# Patient Record
Sex: MALE | Race: WHITE | NOT HISPANIC OR LATINO
[De-identification: names, ages, dates, MRNs, and addresses within clinical notes are randomized per-mention and may not be internally consistent; named-entity substitution may affect disease eponyms.]

---

## 2017-05-08 PROBLEM — Z00.00 ENCOUNTER FOR PREVENTIVE HEALTH EXAMINATION: Status: ACTIVE | Noted: 2017-05-08

## 2017-06-13 ENCOUNTER — FORM ENCOUNTER (OUTPATIENT)
Age: 72
End: 2017-06-13

## 2017-06-14 ENCOUNTER — APPOINTMENT (OUTPATIENT)
Dept: SPINE | Facility: CLINIC | Age: 72
End: 2017-06-14

## 2017-06-14 ENCOUNTER — OUTPATIENT (OUTPATIENT)
Dept: OUTPATIENT SERVICES | Facility: HOSPITAL | Age: 72
LOS: 1 days | End: 2017-06-14
Payer: MEDICARE

## 2017-06-14 VITALS
OXYGEN SATURATION: 96 % | HEART RATE: 75 BPM | BODY MASS INDEX: 28.56 KG/M2 | SYSTOLIC BLOOD PRESSURE: 134 MMHG | HEIGHT: 67 IN | WEIGHT: 182 LBS | DIASTOLIC BLOOD PRESSURE: 81 MMHG

## 2017-06-14 DIAGNOSIS — Z86.79 PERSONAL HISTORY OF OTHER DISEASES OF THE CIRCULATORY SYSTEM: ICD-10-CM

## 2017-06-14 DIAGNOSIS — Z78.9 OTHER SPECIFIED HEALTH STATUS: ICD-10-CM

## 2017-06-14 DIAGNOSIS — Z82.49 FAMILY HISTORY OF ISCHEMIC HEART DISEASE AND OTHER DISEASES OF THE CIRCULATORY SYSTEM: ICD-10-CM

## 2017-06-14 DIAGNOSIS — Z87.891 PERSONAL HISTORY OF NICOTINE DEPENDENCE: ICD-10-CM

## 2017-06-14 PROCEDURE — 72110 X-RAY EXAM L-2 SPINE 4/>VWS: CPT

## 2017-06-14 PROCEDURE — 72082 X-RAY EXAM ENTIRE SPI 2/3 VW: CPT

## 2017-06-14 PROCEDURE — 72084 X-RAY EXAM ENTIRE SPI 6/> VW: CPT | Mod: 26

## 2017-06-15 PROBLEM — Z87.891 FORMER SMOKER: Status: ACTIVE | Noted: 2017-06-14

## 2017-06-15 PROBLEM — Z82.49 FAMILY HISTORY OF HYPERTENSION: Status: ACTIVE | Noted: 2017-06-14

## 2017-06-15 PROBLEM — Z86.79 HISTORY OF HYPERTENSION: Status: RESOLVED | Noted: 2017-06-14 | Resolved: 2017-06-15

## 2017-06-15 PROBLEM — Z78.9 DOES NOT USE ILLICIT DRUGS: Status: ACTIVE | Noted: 2017-06-14

## 2017-06-15 RX ORDER — ENALAPRIL MALEATE 20 MG/1
20 TABLET ORAL
Refills: 0 | Status: ACTIVE | COMMUNITY

## 2017-06-15 RX ORDER — ACETAMINOPHEN 325 MG/1
TABLET, FILM COATED ORAL
Refills: 0 | Status: ACTIVE | COMMUNITY

## 2017-08-02 ENCOUNTER — APPOINTMENT (OUTPATIENT)
Dept: SPINE | Facility: CLINIC | Age: 72
End: 2017-08-02
Payer: MEDICARE

## 2017-08-02 VITALS
SYSTOLIC BLOOD PRESSURE: 150 MMHG | BODY MASS INDEX: 28.25 KG/M2 | WEIGHT: 180 LBS | HEIGHT: 67 IN | DIASTOLIC BLOOD PRESSURE: 75 MMHG | HEART RATE: 73 BPM | OXYGEN SATURATION: 95 %

## 2017-08-02 DIAGNOSIS — R41.3 OTHER AMNESIA: ICD-10-CM

## 2017-08-02 PROCEDURE — 99214 OFFICE O/P EST MOD 30 MIN: CPT

## 2017-09-28 ENCOUNTER — FORM ENCOUNTER (OUTPATIENT)
Age: 72
End: 2017-09-28

## 2017-09-29 ENCOUNTER — OUTPATIENT (OUTPATIENT)
Dept: OUTPATIENT SERVICES | Facility: HOSPITAL | Age: 72
LOS: 1 days | End: 2017-09-29
Payer: MEDICARE

## 2017-09-29 PROCEDURE — 72131 CT LUMBAR SPINE W/O DYE: CPT

## 2017-09-29 PROCEDURE — 72131 CT LUMBAR SPINE W/O DYE: CPT | Mod: 26

## 2017-10-05 VITALS
WEIGHT: 188.5 LBS | TEMPERATURE: 98 F | DIASTOLIC BLOOD PRESSURE: 92 MMHG | HEIGHT: 67 IN | RESPIRATION RATE: 18 BRPM | SYSTOLIC BLOOD PRESSURE: 157 MMHG | OXYGEN SATURATION: 95 % | HEART RATE: 70 BPM

## 2017-10-05 NOTE — PATIENT PROFILE ADULT. - NS PRO AD PATIENT TYPE
[facility]    [address] [facility] [address]    Phone:  [phone]           Antonio De La Fuente   MRN: [id number]    Department:  [facility]   Date of Visit:  6/22/2017           Diagnosis     Olecranon bursitis of right elbow        You were seen by Burke Coronado MD and Natalia Garcia PA-C.      Disclaimer     Follow-up Care:  It is your responsibility to arrange for follow-up care with your healthcare provider or as instructed. Call to get an appointment time.           Contact your doctor for follow-up appointment if not already scheduled.     Follow up with Your PCP. Schedule an appointment as soon as possible for a visit in 1 week.    Comments:  For follow up        Schedule an appointment as soon as possible for a visit with Kenrick Duval MD.    Specialty:  Orthopedic Surgery    Comments:  As needed, For orthopedic follow up.    Contact information    [address] [facility] [address]  [phone]        Medications you received while in the ED through 06/22/2017  3:28 PM     Date/Time Order Dose Route Action    06/22/2017  2:21 PM acetaminophen (TYLENOL) tablet 650 mg 650 mg Oral Given         What to Do with Your Medications      START taking these medications today unless otherwise stated        Details    acetaminophen 500 MG tablet   Commonly known as:  TYLENOL        Take 1 tablet by mouth every 6 hours as needed for Pain.   Authorizing Provider:  Natalia Garcia       ibuprofen 800 MG tablet   Commonly known as:  MOTRIN        Take 1 tablet by mouth 3 times daily as needed for Pain.   Authorizing Provider:  Natalia Garcia                             Where to Get Your Medications      You can get these medications from any pharmacy     Bring a paper prescription for each of these medications     acetaminophen 500 MG tablet    ibuprofen 800 MG tablet               Procedures and tests performed during your visit     Urine pregnancy POC    XR  Elbow 3 View Right      Procedures     None      Imaging Results         XR Elbow 3 View Right (Final result) Result time:  06/22/17 14:56:14    Final result    Impression:    IMPRESSION:    No acute osseous findings.    I have reviewed the images and agree with the Resident interpretation.      Narrative:      XR ELBOW 3+ VW RIGHT    HISTORY:  Elbow pain.    COMPARISON:  None.    TECHNIQUE: 4 views right elbow    FINDINGS:    No fracture or dislocation. No joint effusion. No significant soft tissue  swelling.              Discharge Instructions         Bursitis En El Codo (Olécranon) [Bursitis, Elbow (Olecranon)]  La articulación del codo contiene un pequeño saco lleno de líquido llamado bolsa, que ayuda a los músculos y los tendones a deslizarse con fluidez sobre el hueso. Además, la bolsa protege el codo suavizando los impactos. Se produce la bursitis cuando la bolsa se inflama o se hincha, a menudo por el sobreuso o sebastián lesión al codo. La bursitis se caracteriza por síntomas gretchen hinchazón y dolor. Si el codo está enrojecido y caliente al tacto, es posible que la bolsa se haya infectado.  En muchos casos, la bursitis del codo se resuelve con medicamentos y autocuidados en la casa. La bolsa podría tardar de 2 a 3 semanas en sanar y desinflamarse por completo. En algunos casos, se drena el exceso de líquido de la bolsa; también pueden inyectarse medicamentos directamente en la bolsa, para ayudar a aliviar los síntomas. En casos graves, podría ser necesario extirpar (sacar) la bolsa quirúrgicamente.    Cuidados En La [place]  Medicamentos: Para aliviar el dolor y la hinchazón, podrían recomendarle que tome medicamentos de venta francesca o recetarle analgésicos (calmantes del dolor). [place] todos brian medicamentos de la forma indicada. Podrían recetarle antibióticos para tratar o prevenir sebastián infección. Si se los recetan, tómelos según las indicaciones hasta que se le terminen.  Cuidados Generales:   · Aplíquese al codo sebastián  compresa de hielo o sebastián bolsa de chícharos congelados envuelta en un paño macias truong 15 a 20 minutos a la vez. Jose esto 3 o 4 veces al día hasta que le mejore el dolor y la hinchazón.  · Mantenga elevado el codo por encima del nivel del corazón siempre que le sea posible. [place] ayudará a reducir la hinchazón. Al sentarse o acostarse, apoye el brazo en sebastián almohada colocada sobre el pecho o a aquino lado.  · Véndese la articulación del codo con sebastián venda Ace para comprimir la celia mientras galdino. Asegúrese de que la venda quede ceñida, jose roberto no apretada al punto de causarle dolor.  · Descanse el codo para darle tiempo para sanar. Quizás necesite ponerse sebastián codera para ayudar a protegerse la articulación y limitar brian movimientos. Truong aquino recuperación e incluso después, evite apoyarse en los codos.  Jose sebastián VISITA DE CONTROL según le indique el médico o el personal del centro. Si lo shannon remitido a un especialista, jsoe sebastián jeffry sin demora.  Obtenga Atención Médica Inmediata  en cualquiera de los siguientes casos:  · Fiebre de 100.4°F (38°C) o superior  · Mayor dolor, hinchazón, sensación de calor, enrojecimiento o secreción de la articulación  · Dificultades para  la articulación del codo  · Cortadas o heridas abiertas en la articulación  · Entumecimiento u hormigueo de la mano  · Stevan dolor o hinchazón del antebrazo o la mano  · Pérdida del color gomez y lenta recuperación del color después de apretar la punta de los dedos o la mano  © [id number] [employer] [address] [place] [address], [place] [address]. Todos los derechos reservados. Esta información no pretende sustituir la atención médica profesional. Sólo aquino médico puede diagnosticar y tratar un problema de jennifer.          Discharge References/Attachments     None      Medication Safety: What you need to know     Maintain Security - It is important to keep all medications in a secure location:  Keep out of the reach of children and  pets    Consider using a lock box or locked filing cabinet    Place pill bottles in private area such as bedroom or drawer    Don't Share - It is illegal to share your prescription medication, even with family:  The doctor prescribes medications specifically for you and your body    You cannot be sure how the drug may affect others physically or emotionally    It is a criminal offense to share prescriptions    Proper Disposal - It is no longer acceptable to flush or throw away medications:  Recent studies show measurable amounts of medication have been found in drinking water and wildlife due to flushing or throwing away medications    Medication strength changes over time and is not typically safe after one year    Proper disposal removes the medication from your home in a safe way so that others don't have access to it. Use your local drug drop site.    Your local pharmacy can provide information on medication disposal options in your community. The Department of Justice Drug Enforcement Administration website also has information on safe medication disposal:  www.deadiversion.[facility].gov/drug_disposal/index.html         Health Care Proxy (HCP)

## 2017-10-06 ENCOUNTER — INPATIENT (INPATIENT)
Facility: HOSPITAL | Age: 72
LOS: 1 days | Discharge: HOME | DRG: 460 | End: 2017-10-08
Attending: NEUROLOGICAL SURGERY | Admitting: NEUROLOGICAL SURGERY
Payer: MEDICARE

## 2017-10-06 DIAGNOSIS — M48.061 SPINAL STENOSIS, LUMBAR REGION WITHOUT NEUROGENIC CLAUDICATION: ICD-10-CM

## 2017-10-06 PROCEDURE — 22612 ARTHRD PST TQ 1NTRSPC LUMBAR: CPT | Mod: 62

## 2017-10-06 PROCEDURE — 61783 SCAN PROC SPINAL: CPT | Mod: 59

## 2017-10-06 PROCEDURE — 22840 INSERT SPINE FIXATION DEVICE: CPT

## 2017-10-06 PROCEDURE — 22840 INSERT SPINE FIXATION DEVICE: CPT | Mod: 80

## 2017-10-06 PROCEDURE — 63047 LAM FACETEC & FORAMOT LUMBAR: CPT | Mod: 62

## 2017-10-06 PROCEDURE — 63048 LAM FACETEC &FORAMOT EA ADDL: CPT | Mod: 62

## 2017-10-06 PROCEDURE — 72131 CT LUMBAR SPINE W/O DYE: CPT | Mod: 26

## 2017-10-06 PROCEDURE — 61783 SCAN PROC SPINAL: CPT | Mod: 80

## 2017-10-06 RX ORDER — ACETAMINOPHEN 500 MG
650 TABLET ORAL EVERY 6 HOURS
Qty: 0 | Refills: 0 | Status: DISCONTINUED | OUTPATIENT
Start: 2017-10-06 | End: 2017-10-08

## 2017-10-06 RX ORDER — ACETAMINOPHEN 500 MG
1000 TABLET ORAL ONCE
Qty: 0 | Refills: 0 | Status: DISCONTINUED | OUTPATIENT
Start: 2017-10-06 | End: 2017-10-08

## 2017-10-06 RX ORDER — FINASTERIDE 5 MG/1
5 TABLET, FILM COATED ORAL DAILY
Qty: 0 | Refills: 0 | Status: DISCONTINUED | OUTPATIENT
Start: 2017-10-06 | End: 2017-10-08

## 2017-10-06 RX ORDER — SENNA PLUS 8.6 MG/1
2 TABLET ORAL AT BEDTIME
Qty: 0 | Refills: 0 | Status: DISCONTINUED | OUTPATIENT
Start: 2017-10-06 | End: 2017-10-08

## 2017-10-06 RX ORDER — ONDANSETRON 8 MG/1
4 TABLET, FILM COATED ORAL EVERY 6 HOURS
Qty: 0 | Refills: 0 | Status: DISCONTINUED | OUTPATIENT
Start: 2017-10-06 | End: 2017-10-08

## 2017-10-06 RX ORDER — HYDROMORPHONE HYDROCHLORIDE 2 MG/ML
0.5 INJECTION INTRAMUSCULAR; INTRAVENOUS; SUBCUTANEOUS
Qty: 0 | Refills: 0 | Status: DISCONTINUED | OUTPATIENT
Start: 2017-10-06 | End: 2017-10-07

## 2017-10-06 RX ORDER — TAMSULOSIN HYDROCHLORIDE 0.4 MG/1
0.4 CAPSULE ORAL AT BEDTIME
Qty: 0 | Refills: 0 | Status: DISCONTINUED | OUTPATIENT
Start: 2017-10-06 | End: 2017-10-08

## 2017-10-06 RX ORDER — GINKGO BILOBA 40 MG
0 CAPSULE ORAL
Qty: 0 | Refills: 0 | COMMUNITY

## 2017-10-06 RX ORDER — ASPIRIN/CALCIUM CARB/MAGNESIUM 324 MG
1 TABLET ORAL
Qty: 0 | Refills: 0 | COMMUNITY

## 2017-10-06 RX ORDER — OXYCODONE AND ACETAMINOPHEN 5; 325 MG/1; MG/1
1 TABLET ORAL EVERY 4 HOURS
Qty: 0 | Refills: 0 | Status: DISCONTINUED | OUTPATIENT
Start: 2017-10-06 | End: 2017-10-06

## 2017-10-06 RX ORDER — SODIUM CHLORIDE 9 MG/ML
1000 INJECTION INTRAMUSCULAR; INTRAVENOUS; SUBCUTANEOUS
Qty: 0 | Refills: 0 | Status: DISCONTINUED | OUTPATIENT
Start: 2017-10-06 | End: 2017-10-07

## 2017-10-06 RX ORDER — OXYCODONE AND ACETAMINOPHEN 5; 325 MG/1; MG/1
2 TABLET ORAL EVERY 4 HOURS
Qty: 0 | Refills: 0 | Status: DISCONTINUED | OUTPATIENT
Start: 2017-10-06 | End: 2017-10-08

## 2017-10-06 RX ORDER — BUPIVACAINE 13.3 MG/ML
20 INJECTION, SUSPENSION, LIPOSOMAL INFILTRATION ONCE
Qty: 0 | Refills: 0 | Status: DISCONTINUED | OUTPATIENT
Start: 2017-10-06 | End: 2017-10-08

## 2017-10-06 RX ORDER — CYCLOBENZAPRINE HYDROCHLORIDE 10 MG/1
5 TABLET, FILM COATED ORAL THREE TIMES A DAY
Qty: 0 | Refills: 0 | Status: DISCONTINUED | OUTPATIENT
Start: 2017-10-06 | End: 2017-10-08

## 2017-10-06 RX ORDER — DOCUSATE SODIUM 100 MG
100 CAPSULE ORAL THREE TIMES A DAY
Qty: 0 | Refills: 0 | Status: DISCONTINUED | OUTPATIENT
Start: 2017-10-06 | End: 2017-10-08

## 2017-10-06 RX ORDER — INFLUENZA VIRUS VACCINE 15; 15; 15; 15 UG/.5ML; UG/.5ML; UG/.5ML; UG/.5ML
0.5 SUSPENSION INTRAMUSCULAR ONCE
Qty: 0 | Refills: 0 | Status: COMPLETED | OUTPATIENT
Start: 2017-10-06 | End: 2017-10-08

## 2017-10-06 RX ORDER — PANTOPRAZOLE SODIUM 20 MG/1
40 TABLET, DELAYED RELEASE ORAL DAILY
Qty: 0 | Refills: 0 | Status: DISCONTINUED | OUTPATIENT
Start: 2017-10-06 | End: 2017-10-08

## 2017-10-06 RX ORDER — DEXAMETHASONE 0.5 MG/5ML
4 ELIXIR ORAL EVERY 6 HOURS
Qty: 0 | Refills: 0 | Status: DISCONTINUED | OUTPATIENT
Start: 2017-10-06 | End: 2017-10-07

## 2017-10-06 RX ORDER — OXYCODONE AND ACETAMINOPHEN 5; 325 MG/1; MG/1
1 TABLET ORAL EVERY 4 HOURS
Qty: 0 | Refills: 0 | Status: DISCONTINUED | OUTPATIENT
Start: 2017-10-06 | End: 2017-10-08

## 2017-10-06 RX ORDER — HYDROMORPHONE HYDROCHLORIDE 2 MG/ML
1 INJECTION INTRAMUSCULAR; INTRAVENOUS; SUBCUTANEOUS
Qty: 0 | Refills: 0 | Status: DISCONTINUED | OUTPATIENT
Start: 2017-10-06 | End: 2017-10-06

## 2017-10-06 RX ADMIN — FINASTERIDE 5 MILLIGRAM(S): 5 TABLET, FILM COATED ORAL at 20:51

## 2017-10-06 RX ADMIN — TAMSULOSIN HYDROCHLORIDE 0.4 MILLIGRAM(S): 0.4 CAPSULE ORAL at 20:51

## 2017-10-06 RX ADMIN — HYDROMORPHONE HYDROCHLORIDE 0.5 MILLIGRAM(S): 2 INJECTION INTRAMUSCULAR; INTRAVENOUS; SUBCUTANEOUS at 14:23

## 2017-10-06 RX ADMIN — HYDROMORPHONE HYDROCHLORIDE 0.5 MILLIGRAM(S): 2 INJECTION INTRAMUSCULAR; INTRAVENOUS; SUBCUTANEOUS at 14:43

## 2017-10-06 RX ADMIN — Medication 4 MILLIGRAM(S): at 23:37

## 2017-10-06 RX ADMIN — Medication 4 MILLIGRAM(S): at 18:08

## 2017-10-06 NOTE — PROGRESS NOTE ADULT - SUBJECTIVE AND OBJECTIVE BOX
Subjective:  Post op note  POD#) L4-5 decompression laminectomy, posterior fusion instrumentation  Patient doing without significant pain. Surgical is wozzing secondary to hx of aspirin.    T(C): 36.2 (10-06-17 @ 16:32), Max: 36.7 (10-06-17 @ 15:00)  HR: 84 (10-06-17 @ 16:32) (80 - 90)  BP: 148/83 (10-06-17 @ 16:32) (134/70 - 166/94)  RR: 17 (10-06-17 @ 16:32) (10 - 17)  SpO2: 97% (10-06-17 @ 16:32) (91% - 98%)  Wt(kg): --    Exam: A&O x 3 NAD  Back: dressing was soaked with blood. It was changed. Pressure applied to 4   wozzing   spots..  Ext: No focal motor deficit. 5/5 x 4    Wound: dressing changed as above. Hemovac in place    Imaging: none    Assessment/Plan: 71 male post lumbar decompression doing well  Plan: change dressing as needed  Monitor hemovac output  Regular diet  Remove osorio in AM  continue decadron

## 2017-10-06 NOTE — CONSULT NOTE ADULT - SUBJECTIVE AND OBJECTIVE BOX
Pre-surgical Pain Inventory    Surgery: L45 lamifusion    Surgeon: Dr. Aldridge    PAST MEDICAL & SURGICAL HISTORY:  Lumbar stenosis  BPH (benign prostatic hyperplasia)  HTN (hypertension)  No significant past surgical history      Current Pre-op Pain Medications  Tylenol  Prescribed by: N/A    Past Pain Medication:   Pt denies recollection of pain meds. States that he took a short course following dental procedure, but does not recall name, states that he had no SE.  [] Oxycodone  [] Hydrocodone  [] Methadone  [] Fentanyl  [] Dilaudid  [] Morphine  [] Tramadol  [X] NSAIDs/Anti-inflammatories  [] Medrol/Decadron  [] Neuropathic Agents  [] Muscle Relaxants  - Denies  [] Anxiolytics  - Denies  [] Anti-depressants   - Denies  [] Sleep aids  - Denies      Allergies    No Known Allergies    Intolerances        Vital Signs:  Vital Signs Last 24 Hrs  T(C): 36.5 (05 Oct 2017 09:36), Max: 36.5 (05 Oct 2017 09:36)  T(F): 97.7 (05 Oct 2017 09:36), Max: 97.7 (05 Oct 2017 09:36)  HR: 70 (05 Oct 2017 09:36) (70 - 70)  BP: 157/92 (05 Oct 2017 09:36) (157/92 - 157/92)  BP(mean): --  RR: 18 (05 Oct 2017 09:36) (18 - 18)  SpO2: 95% (05 Oct 2017 09:36) (95% - 95%)    Labs:     FUNCTIONAL ASSESSMENT:  AVERAGE DAILY PAIN SCORE: Mild 3-4/10    PAIN ASSESSMENT:  Pt states that pain is mild-mod. Pain is in low back, pressure like sensation, non-continuous, primarily brought on when ambulating. Pt denies radicular pain into buttocks/legs/groin/abdomen, pt denies numbness/cramping, motor weakness in LE. States that he has isolated difficulty with hand , reports hx of dropping items.    Assessment:   70 yo M w/ c/o low back pain w/ plan for L45 lamifusion Pre-surgical Pain Inventory    Surgery: L45 lamifusion    Surgeon: Dr. Aldridge    PAST MEDICAL & SURGICAL HISTORY:  Lumbar stenosis  BPH (benign prostatic hyperplasia)  HTN (hypertension)  No significant past surgical history      Current Pre-op Pain Medications  Tylenol  Prescribed by: N/A    Past Pain Medication:   Pt denies recollection of pain meds. States that he took a short course following dental procedure, but does not recall name, states that he had no SE.  [] Oxycodone  [] Hydrocodone  [] Methadone  [] Fentanyl  [] Dilaudid  [] Morphine  [] Tramadol  [X] NSAIDs/Anti-inflammatories  [] Medrol/Decadron  [] Neuropathic Agents  [] Muscle Relaxants  - Denies  [] Anxiolytics  - Denies  [] Anti-depressants   - Denies  [] Sleep aids  - Denies      Allergies    No Known Allergies    Intolerances        Vital Signs:  Vital Signs Last 24 Hrs  T(C): 36.5 (05 Oct 2017 09:36), Max: 36.5 (05 Oct 2017 09:36)  T(F): 97.7 (05 Oct 2017 09:36), Max: 97.7 (05 Oct 2017 09:36)  HR: 70 (05 Oct 2017 09:36) (70 - 70)  BP: 157/92 (05 Oct 2017 09:36) (157/92 - 157/92)  BP(mean): --  RR: 18 (05 Oct 2017 09:36) (18 - 18)  SpO2: 95% (05 Oct 2017 09:36) (95% - 95%)    Labs:     FUNCTIONAL ASSESSMENT:  AVERAGE DAILY PAIN SCORE: Mild 3-4/10    PAIN ASSESSMENT:  Pt states that pain is mild-mod. Pain is in low back, pressure like sensation, non-continuous, primarily brought on when ambulating. Pt denies radicular pain into buttocks/legs/groin/abdomen, pt denies numbness/cramping, motor weakness in LE. States that he has isolated difficulty with hand , reports hx of dropping items.    Assessment:   70 yo M w/ c/o low back pain w/ plan for L45 lamifusion    Plan:   Immediate post-op plan  - Dilaudid 0.5mg q1h PRN for Severe Pain in PACU, Ofirmev as 1st line on floor  - Percocet dosing, 5-10mg q4h PRN for Mod-Severe Pain  - Flexeril 5mg q8h PRN for Spasms    Rescue plan  - Dilaudid  - Ofirmev    Tentative floor plan  - PO Percocet, Flexeril

## 2017-10-06 NOTE — H&P ADULT - HISTORY OF PRESENT ILLNESS
71 male With hx of HTN, BPH complaints lower back pain radiating to bilateral LE with neurogenic claudication. x 1 month progressive getting worse. He can not walk for more than  block. He denies weakness and numbness.

## 2017-10-06 NOTE — BRIEF OPERATIVE NOTE - PROCEDURE
<<-----Click on this checkbox to enter Procedure Lumbar laminectomy with fusion  10/06/2017    Active  CANDY

## 2017-10-07 LAB
ALBUMIN SERPL ELPH-MCNC: 3.4 G/DL — SIGNIFICANT CHANGE UP (ref 3.3–5)
ALP SERPL-CCNC: 73 U/L — SIGNIFICANT CHANGE UP (ref 40–120)
ALT FLD-CCNC: 20 U/L — SIGNIFICANT CHANGE UP (ref 10–45)
ANION GAP SERPL CALC-SCNC: 13 MMOL/L — SIGNIFICANT CHANGE UP (ref 5–17)
AST SERPL-CCNC: 23 U/L — SIGNIFICANT CHANGE UP (ref 10–40)
BASOPHILS NFR BLD AUTO: 0 % — SIGNIFICANT CHANGE UP (ref 0–2)
BILIRUB SERPL-MCNC: 1.3 MG/DL — HIGH (ref 0.2–1.2)
BUN SERPL-MCNC: 14 MG/DL — SIGNIFICANT CHANGE UP (ref 7–23)
CALCIUM SERPL-MCNC: 8.8 MG/DL — SIGNIFICANT CHANGE UP (ref 8.4–10.5)
CHLORIDE SERPL-SCNC: 99 MMOL/L — SIGNIFICANT CHANGE UP (ref 96–108)
CO2 SERPL-SCNC: 23 MMOL/L — SIGNIFICANT CHANGE UP (ref 22–31)
CREAT SERPL-MCNC: 0.85 MG/DL — SIGNIFICANT CHANGE UP (ref 0.5–1.3)
EOSINOPHIL NFR BLD AUTO: 0 % — SIGNIFICANT CHANGE UP (ref 0–6)
GLUCOSE SERPL-MCNC: 155 MG/DL — HIGH (ref 70–99)
HCT VFR BLD CALC: 34.6 % — LOW (ref 39–50)
HGB BLD-MCNC: 11.5 G/DL — LOW (ref 13–17)
LYMPHOCYTES # BLD AUTO: 7 % — LOW (ref 13–44)
MCHC RBC-ENTMCNC: 23 PG — LOW (ref 27–34)
MCHC RBC-ENTMCNC: 33.2 G/DL — SIGNIFICANT CHANGE UP (ref 32–36)
MCV RBC AUTO: 69.1 FL — LOW (ref 80–100)
MONOCYTES NFR BLD AUTO: 5.5 % — SIGNIFICANT CHANGE UP (ref 2–14)
NEUTROPHILS NFR BLD AUTO: 87.5 % — HIGH (ref 43–77)
PLATELET # BLD AUTO: 136 K/UL — LOW (ref 150–400)
POTASSIUM SERPL-MCNC: 4.1 MMOL/L — SIGNIFICANT CHANGE UP (ref 3.5–5.3)
POTASSIUM SERPL-SCNC: 4.1 MMOL/L — SIGNIFICANT CHANGE UP (ref 3.5–5.3)
PROT SERPL-MCNC: 6.4 G/DL — SIGNIFICANT CHANGE UP (ref 6–8.3)
RBC # BLD: 5.01 M/UL — SIGNIFICANT CHANGE UP (ref 4.2–5.8)
RBC # FLD: 15.2 % — SIGNIFICANT CHANGE UP (ref 10.3–16.9)
SODIUM SERPL-SCNC: 135 MMOL/L — SIGNIFICANT CHANGE UP (ref 135–145)
WBC # BLD: 14.4 K/UL — HIGH (ref 3.8–10.5)
WBC # FLD AUTO: 14.4 K/UL — HIGH (ref 3.8–10.5)

## 2017-10-07 RX ORDER — DEXAMETHASONE 0.5 MG/5ML
4 ELIXIR ORAL EVERY 8 HOURS
Qty: 0 | Refills: 0 | Status: DISCONTINUED | OUTPATIENT
Start: 2017-10-07 | End: 2017-10-08

## 2017-10-07 RX ORDER — ENOXAPARIN SODIUM 100 MG/ML
40 INJECTION SUBCUTANEOUS AT BEDTIME
Qty: 0 | Refills: 0 | Status: DISCONTINUED | OUTPATIENT
Start: 2017-10-07 | End: 2017-10-08

## 2017-10-07 RX ORDER — ZOLPIDEM TARTRATE 10 MG/1
5 TABLET ORAL AT BEDTIME
Qty: 0 | Refills: 0 | Status: DISCONTINUED | OUTPATIENT
Start: 2017-10-07 | End: 2017-10-08

## 2017-10-07 RX ORDER — DIPHENHYDRAMINE HCL 50 MG
25 CAPSULE ORAL EVERY 6 HOURS
Qty: 0 | Refills: 0 | Status: DISCONTINUED | OUTPATIENT
Start: 2017-10-07 | End: 2017-10-07

## 2017-10-07 RX ADMIN — Medication 100 MILLIGRAM(S): at 05:46

## 2017-10-07 RX ADMIN — Medication 4 MILLIGRAM(S): at 21:47

## 2017-10-07 RX ADMIN — Medication 4 MILLIGRAM(S): at 05:46

## 2017-10-07 RX ADMIN — CYCLOBENZAPRINE HYDROCHLORIDE 5 MILLIGRAM(S): 10 TABLET, FILM COATED ORAL at 00:09

## 2017-10-07 RX ADMIN — FINASTERIDE 5 MILLIGRAM(S): 5 TABLET, FILM COATED ORAL at 11:05

## 2017-10-07 RX ADMIN — ENOXAPARIN SODIUM 40 MILLIGRAM(S): 100 INJECTION SUBCUTANEOUS at 21:48

## 2017-10-07 RX ADMIN — CYCLOBENZAPRINE HYDROCHLORIDE 5 MILLIGRAM(S): 10 TABLET, FILM COATED ORAL at 12:05

## 2017-10-07 RX ADMIN — PANTOPRAZOLE SODIUM 40 MILLIGRAM(S): 20 TABLET, DELAYED RELEASE ORAL at 11:05

## 2017-10-07 RX ADMIN — Medication 100 MILLIGRAM(S): at 21:47

## 2017-10-07 RX ADMIN — TAMSULOSIN HYDROCHLORIDE 0.4 MILLIGRAM(S): 0.4 CAPSULE ORAL at 21:47

## 2017-10-07 RX ADMIN — Medication 20 MILLIGRAM(S): at 11:05

## 2017-10-07 RX ADMIN — Medication 100 MILLIGRAM(S): at 13:38

## 2017-10-07 RX ADMIN — Medication 4 MILLIGRAM(S): at 12:05

## 2017-10-07 NOTE — PHYSICAL THERAPY INITIAL EVALUATION ADULT - ADDITIONAL COMMENTS
Patient lives with spouse in apartment building with elevators, patient was independent in ADL's and IADL's. no home care prior.  Patient was independent in community  ambulation without assistive device

## 2017-10-07 NOTE — PHYSICAL THERAPY INITIAL EVALUATION ADULT - ASR EQUIP NEEDS DISCH PT EVAL
Patient was issued an alluminum adjustable cane Patient was issued an alluminum adjustable cane for home discharge

## 2017-10-07 NOTE — PROGRESS NOTE ADULT - SUBJECTIVE AND OBJECTIVE BOX
HPI:  71 male With hx of HTN, BPH complaints lower back pain radiating to bilateral LE with neurogenic claudication. x 1 month progressive getting worse. He can not walk for more than  block. He denies weakness and numbness. (06 Oct 2017 13:01)    OVERNIGHT EVENTS:  Pt c/o insomnia overnight. Pain controlled, incisional . No leg pain. Pt not OOB yet. Osorio in place. Wound dressing changed 2 times post op for saturation.     Vital Signs Last 24 Hrs  T(C): 36.3 (07 Oct 2017 09:04), Max: 36.7 (06 Oct 2017 15:00)  T(F): 97.4 (07 Oct 2017 09:04), Max: 98 (06 Oct 2017 15:00)  HR: 76 (07 Oct 2017 09:04) (72 - 91)  BP: 140/78 (07 Oct 2017 09:04) (118/70 - 166/94)  BP(mean): 106 (06 Oct 2017 16:00) (93 - 121)  RR: 14 (07 Oct 2017 09:04) (10 - 17)  SpO2: 97% (07 Oct 2017 09:04) (91% - 98%)    I&O's Summary    06 Oct 2017 07:01  -  07 Oct 2017 07:00  --------------------------------------------------------  IN: 1880 mL / OUT: 2220 mL / NET: -340 mL        PHYSICAL EXAM:  Neurological:  DEAN 5/5 motor  sensory intact    Incision/Wound: dressing saturated. Changed. Staples in place. No active drainage noted.     TUBES/LINES:  [x] Osorio  [] Lumbar Drain  [x] Wound Drains-20cc/shift  [] Others      DIET:  [] NPO  [x] Mechanical  [] Tube feeds    LABS:                        11.5   14.4  )-----------( 136      ( 07 Oct 2017 07:45 )             34.6     10-07    135  |  99  |  14  ----------------------------<  155<H>  4.1   |  23  |  0.85    Ca    8.8      07 Oct 2017 07:45    TPro  6.4  /  Alb  3.4  /  TBili  1.3<H>  /  DBili  x   /  AST  23  /  ALT  20  /  AlkPhos  73  10-07            CAPILLARY BLOOD GLUCOSE          Drug Levels: [] N/A    CSF Analysis: [] N/A      Allergies    No Known Allergies    Intolerances      MEDICATIONS:  Antibiotics:    Neuro:  acetaminophen   Tablet 650 milliGRAM(s) Oral every 6 hours PRN  acetaminophen   Tablet. 650 milliGRAM(s) Oral every 6 hours PRN  acetaminophen  IVPB. 1000 milliGRAM(s) IV Intermittent once PRN  cyclobenzaprine 5 milliGRAM(s) Oral three times a day PRN  ondansetron Injectable 4 milliGRAM(s) IV Push every 6 hours PRN  oxyCODONE    5 mG/acetaminophen 325 mG 1 Tablet(s) Oral every 4 hours PRN  oxyCODONE    5 mG/acetaminophen 325 mG 2 Tablet(s) Oral every 4 hours PRN  zolpidem 5 milliGRAM(s) Oral at bedtime PRN    Anticoagulation:    OTHER:  BUpivacaine liposome 1.3% Injectable (no eMAR) 20 milliLiter(s) Local Injection once  dexamethasone     Tablet 4 milliGRAM(s) Oral every 8 hours  docusate sodium 100 milliGRAM(s) Oral three times a day  enalapril 20 milliGRAM(s) Oral daily  finasteride 5 milliGRAM(s) Oral daily  influenza   Vaccine 0.5 milliLiter(s) IntraMuscular once  pantoprazole    Tablet 40 milliGRAM(s) Oral daily  senna 2 Tablet(s) Oral at bedtime PRN  tamsulosin 0.4 milliGRAM(s) Oral at bedtime    IVF:    CULTURES:    RADIOLOGY & ADDITIONAL TESTS:      ASSESSMENT:  71y Male s/p L4-L5 laminectomy fusion on 10/6    M48.06  No h/o HF  No pertinent family history in first degree relatives  Handoff  MEWS Score  Lumbar stenosis  BPH (benign prostatic hyperplasia)  HTN (hypertension)  Lumbar stenosis with neurogenic claudication  Lumbar stenosis with neurogenic claudication  Lumbar stenosis  Lumbar laminectomy with fusion  No significant past surgical history      PLAN:  -pain management  -ambien prn insomnia  -con't wound drain until tomorrow  -MRI Abd to evaluate pancreatic mass seen on outpatient imaging  -d/c osorio for void trial  -heplock IVF  -decadron taper  -D/W     DVT PROPHYLAXIS:  [x] Venodynes                                [x] Lovenox    DISPOSITION:pending PT eval

## 2017-10-07 NOTE — PHYSICAL THERAPY INITIAL EVALUATION ADULT - GENERAL OBSERVATIONS, REHAB EVAL
Patient received supine in bed, in NAD, hemovac, osorio catheter , iv heplock intact ,  family present

## 2017-10-08 ENCOUNTER — TRANSCRIPTION ENCOUNTER (OUTPATIENT)
Age: 72
End: 2017-10-08

## 2017-10-08 VITALS
HEART RATE: 72 BPM | SYSTOLIC BLOOD PRESSURE: 155 MMHG | DIASTOLIC BLOOD PRESSURE: 85 MMHG | OXYGEN SATURATION: 95 % | TEMPERATURE: 97 F | RESPIRATION RATE: 16 BRPM

## 2017-10-08 PROCEDURE — 74183 MRI ABD W/O CNTR FLWD CNTR: CPT | Mod: 26

## 2017-10-08 RX ORDER — SENNA PLUS 8.6 MG/1
2 TABLET ORAL
Qty: 0 | Refills: 0 | DISCHARGE
Start: 2017-10-08

## 2017-10-08 RX ORDER — ACETAMINOPHEN 500 MG
2 TABLET ORAL
Qty: 0 | Refills: 0 | DISCHARGE
Start: 2017-10-08

## 2017-10-08 RX ORDER — PANTOPRAZOLE SODIUM 20 MG/1
1 TABLET, DELAYED RELEASE ORAL
Qty: 10 | Refills: 0
Start: 2017-10-08 | End: 2017-10-18

## 2017-10-08 RX ORDER — CYCLOBENZAPRINE HYDROCHLORIDE 10 MG/1
1 TABLET, FILM COATED ORAL
Qty: 15 | Refills: 0
Start: 2017-10-08 | End: 2017-10-13

## 2017-10-08 RX ORDER — DOCUSATE SODIUM 100 MG
1 CAPSULE ORAL
Qty: 0 | Refills: 0 | DISCHARGE
Start: 2017-10-08

## 2017-10-08 RX ADMIN — Medication 4 MILLIGRAM(S): at 13:23

## 2017-10-08 RX ADMIN — Medication 100 MILLIGRAM(S): at 06:52

## 2017-10-08 RX ADMIN — Medication 4 MILLIGRAM(S): at 06:52

## 2017-10-08 RX ADMIN — INFLUENZA VIRUS VACCINE 0.5 MILLILITER(S): 15; 15; 15; 15 SUSPENSION INTRAMUSCULAR at 13:26

## 2017-10-08 RX ADMIN — FINASTERIDE 5 MILLIGRAM(S): 5 TABLET, FILM COATED ORAL at 13:22

## 2017-10-08 RX ADMIN — Medication 100 MILLIGRAM(S): at 13:22

## 2017-10-08 RX ADMIN — PANTOPRAZOLE SODIUM 40 MILLIGRAM(S): 20 TABLET, DELAYED RELEASE ORAL at 13:23

## 2017-10-08 RX ADMIN — Medication 20 MILLIGRAM(S): at 06:52

## 2017-10-08 NOTE — DISCHARGE NOTE ADULT - CARE PROVIDER_API CALL
Ulices Aldridge), Neurological Surgery  [address], [place] [facility]  Phone: [phone]  Fax: [phone]

## 2017-10-08 NOTE — DISCHARGE NOTE ADULT - CARE PLAN
Principal Discharge DX:	Lumbar stenosis  Goal:	return to prior function  Instructions for follow-up, activity and diet:	Keep your wound clean and dry.   Once a day, ask a family member to check your incision for signs of infection such as: Redness, Swelling and tenderness,  Drainage  • You may use stairs as tolerated.  • You may shower briefly, unless you told not to by your doctor.   Cover your entire incision with a waterproof bandage to make sure it does not get wet. If it gets wet, dry it off.   No tub baths or swimming for two weeks.   Take pain medicine as prescribed.   You may find it helpful to take it for morning stiffness or for soreness when trying to sleep.  o Pain medication can cause constipation. Eating food with fiber such as fruits and  vegetables, and drinking liquids may help prevent constipation.  • Avoid bending, twisting or heavy lifting.  • Do not sit for more than 20 minutes each time you sit.  • Do not wear pants that are tight on your incision.  Call you doctor immediately if you have:  • Any new numbness, tingling, or weakness in your arms and legs.  Worsening pain not responsive to pain meds, Fever of 101° F or more.  You must call discharge to make a follow- up appointment with your doctor.  To make your appointment for any questions, please call: [phone].

## 2017-10-08 NOTE — PROGRESS NOTE ADULT - SUBJECTIVE AND OBJECTIVE BOX
HPI:  71 male With hx of HTN, BPH complaints lower back pain radiating to bilateral LE with neurogenic claudication. x 1 month progressive getting worse. He can not walk for more than  block. He denies weakness and numbness. (06 Oct 2017 13:01)    OVERNIGHT EVENTS: No acute events overnight. Mild incision site pain. Passed TOV    POD#1: Pt c/o insomnia overnight. Pain controlled, incisional . No leg pain. Pt not OOB yet. Whitley in place. Wound dressing changed 2 times post op for saturation.   POD#2: No acute events overnight.  Mild incision site pain. Passed TOV    Vital Signs Last 24 Hrs  T(C): 36.1 (08 Oct 2017 05:09), Max: 37.1 (07 Oct 2017 16:09)  T(F): 96.9 (08 Oct 2017 05:09), Max: 98.7 (07 Oct 2017 16:09)  HR: 64 (08 Oct 2017 05:09) (64 - 76)  BP: 151/85 (08 Oct 2017 05:09) (122/83 - 151/85)  BP(mean): --  RR: 16 (08 Oct 2017 05:09) (14 - 16)  SpO2: 96% (08 Oct 2017 05:09) (95% - 97%)    I&O's Summary    06 Oct 2017 07:01  -  07 Oct 2017 07:00  --------------------------------------------------------  IN: 1880 mL / OUT: 2220 mL / NET: -340 mL    07 Oct 2017 07:01  -  08 Oct 2017 06:23  --------------------------------------------------------  IN: 1020 mL / OUT: 1980 mL / NET: -960 mL      PHYSICAL EXAM:  Neurological: AAOX3, FC, speech coherent  CNII-XII: EOM intact, PERRL  Motor: MAEx4 5/5 UE and LE B/L         [x] warm well perfused; capillary refill <3 seconds   Sensation: [x] intact to light touch  [] decreased:   Incision/Wound: Back incision site C/D/I    TUBES/LINES:  [] Whitley  [] Lumbar Drain  [] Wound Drains  [] Others    DIET:  [] NPO  [x] Mechanical  [] Tube feeds    LABS:                        11.5   14.4  )-----------( 136      ( 07 Oct 2017 07:45 )             34.6     10-07    135  |  99  |  14  ----------------------------<  155<H>  4.1   |  23  |  0.85    Ca    8.8      07 Oct 2017 07:45    TPro  6.4  /  Alb  3.4  /  TBili  1.3<H>  /  DBili  x   /  AST  23  /  ALT  20  /  AlkPhos  73  10-07      CAPILLARY BLOOD GLUCOSE      Drug Levels: [] N/A    CSF Analysis: [] N/A      Allergies    No Known Allergies    Intolerances      MEDICATIONS:  Antibiotics:    Neuro:  acetaminophen   Tablet 650 milliGRAM(s) Oral every 6 hours PRN  acetaminophen   Tablet. 650 milliGRAM(s) Oral every 6 hours PRN  acetaminophen  IVPB. 1000 milliGRAM(s) IV Intermittent once PRN  cyclobenzaprine 5 milliGRAM(s) Oral three times a day PRN  ondansetron Injectable 4 milliGRAM(s) IV Push every 6 hours PRN  oxyCODONE    5 mG/acetaminophen 325 mG 1 Tablet(s) Oral every 4 hours PRN  oxyCODONE    5 mG/acetaminophen 325 mG 2 Tablet(s) Oral every 4 hours PRN  zolpidem 5 milliGRAM(s) Oral at bedtime PRN    Anticoagulation:  enoxaparin Injectable 40 milliGRAM(s) SubCutaneous at bedtime    OTHER:  BUpivacaine liposome 1.3% Injectable (no eMAR) 20 milliLiter(s) Local Injection once  dexamethasone     Tablet 4 milliGRAM(s) Oral every 8 hours  docusate sodium 100 milliGRAM(s) Oral three times a day  enalapril 20 milliGRAM(s) Oral daily  finasteride 5 milliGRAM(s) Oral daily  influenza   Vaccine 0.5 milliLiter(s) IntraMuscular once  pantoprazole    Tablet 40 milliGRAM(s) Oral daily  senna 2 Tablet(s) Oral at bedtime PRN  tamsulosin 0.4 milliGRAM(s) Oral at bedtime    IVF:    CULTURES:    RADIOLOGY & ADDITIONAL TESTS:    ASSESSMENT:  71y Male s/p    M48.06  No h/o HF  No pertinent family history in first degree relatives  Handoff  MEWS Score  Lumbar stenosis  BPH (benign prostatic hyperplasia)  HTN (hypertension)  Lumbar stenosis with neurogenic claudication  Lumbar stenosis with neurogenic claudication  Lumbar stenosis  Lumbar laminectomy with fusion  No significant past surgical history    PLAN:  -monitor hemovac output, remove today  -pain control  -DVT prophylaxis: SCDs, SQ lovenox  -continue decadron 4q8  -PPI while on steroids  -encourage incentive spirometer use  -PT/OT/OOB  -Dispo pending: Home  -D/w Dr. Aldridge

## 2017-10-08 NOTE — DISCHARGE NOTE ADULT - HOSPITAL COURSE
71 male With hx of HTN, BPH complaints lower back pain radiating to bilateral LE with neurogenic claudication. x 1 month progressive getting worse. He can not walk for more than  block. He denies weakness and numbness.   He underwent an L4-L5 decompression and fusion.  Post operatively - he did well.  HMV was removed on POD # 2.  Patient was seen by PT and cleared to go home.  He was able to take PO, pass flatus and ambulate

## 2017-10-08 NOTE — DISCHARGE NOTE ADULT - MEDICATION SUMMARY - MEDICATIONS TO TAKE
I will START or STAY ON the medications listed below when I get home from the hospital:    finasteride 5 mg oral tablet  -- 1 tab(s) by mouth once a day  -- Indication: For BPH (benign prostatic hyperplasia)    Medrol 4 mg oral tablet  -- 1 packet(s) by mouth once a day as directed MDD:as directed on taper  -- It is very important that you take or use this exactly as directed.  Do not skip doses or discontinue unless directed by your doctor.  Obtain medical advice before taking any non-prescription drugs as some may affect the action of this medication.  Take with food or milk.    -- Indication: For Lumbar stenosis    oxyCODONE-acetaminophen 5 mg-325 mg oral tablet  -- 1 tab(s) by mouth every 6 hours, As Needed -Moderate Pain (4 - 6) take 2 tabs for severe pain (7-10) MDD:3g of tylenol  -- Indication: For Lumbar stenosis    acetaminophen 325 mg oral tablet  -- 2 tab(s) by mouth every 6 hours, As needed, Moderate Pain (4 - 6)  -- Indication: For Lumbar stenosis    enalapril 20 mg oral tablet  -- 1 tab(s) by mouth once a day  -- Indication: For HTN (hypertension)    Flomax 0.4 mg oral capsule  -- 1 cap(s) by mouth once a day  -- Indication: For BPH (benign prostatic hyperplasia)    docusate sodium 100 mg oral capsule  -- 1 cap(s) by mouth 3 times a day  -- Indication: For as needed, constipation    senna oral tablet  -- 2 tab(s) by mouth once a day (at bedtime), As needed, Constipation  -- Indication: For as needed constipation    cyclobenzaprine 5 mg oral tablet  -- 1 tab(s) by mouth 3 times a day, As needed, Muscle Spasm MDD:15mg  -- Indication: For Lumbar stenosis    pantoprazole 40 mg oral delayed release tablet  -- 1 tab(s) by mouth once a day while on steroids MDD:40mg  -- Indication: For Lumbar stenosis

## 2017-10-08 NOTE — DISCHARGE NOTE ADULT - NS AS ACTIVITY OBS
No Heavy lifting/straining/Sex allowed/Walking-Outdoors allowed/Stairs allowed/Do not drive or operate machinery/Walking-Indoors allowed

## 2017-10-08 NOTE — DISCHARGE NOTE ADULT - PLAN OF CARE
return to prior function Keep your wound clean and dry.   Once a day, ask a family member to check your incision for signs of infection such as: Redness, Swelling and tenderness,  Drainage  • You may use stairs as tolerated.  • You may shower briefly, unless you told not to by your doctor.   Cover your entire incision with a waterproof bandage to make sure it does not get wet. If it gets wet, dry it off.   No tub baths or swimming for two weeks.   Take pain medicine as prescribed.   You may find it helpful to take it for morning stiffness or for soreness when trying to sleep.  o Pain medication can cause constipation. Eating food with fiber such as fruits and  vegetables, and drinking liquids may help prevent constipation.  • Avoid bending, twisting or heavy lifting.  • Do not sit for more than 20 minutes each time you sit.  • Do not wear pants that are tight on your incision.  Call you doctor immediately if you have:  • Any new numbness, tingling, or weakness in your arms and legs.  Worsening pain not responsive to pain meds, Fever of 101° F or more.  You must call discharge to make a follow- up appointment with your doctor.  To make your appointment for any questions, please call: [phone].

## 2017-10-11 DIAGNOSIS — F03.90 UNSPECIFIED DEMENTIA, UNSPECIFIED SEVERITY, WITHOUT BEHAVIORAL DISTURBANCE, PSYCHOTIC DISTURBANCE, MOOD DISTURBANCE, AND ANXIETY: ICD-10-CM

## 2017-10-11 DIAGNOSIS — M48.062 SPINAL STENOSIS, LUMBAR REGION WITH NEUROGENIC CLAUDICATION: ICD-10-CM

## 2017-10-11 DIAGNOSIS — G47.00 INSOMNIA, UNSPECIFIED: ICD-10-CM

## 2017-10-11 DIAGNOSIS — Z23 ENCOUNTER FOR IMMUNIZATION: ICD-10-CM

## 2017-10-11 DIAGNOSIS — M41.85 OTHER FORMS OF SCOLIOSIS, THORACOLUMBAR REGION: ICD-10-CM

## 2017-10-11 DIAGNOSIS — N40.0 BENIGN PROSTATIC HYPERPLASIA WITHOUT LOWER URINARY TRACT SYMPTOMS: ICD-10-CM

## 2017-10-11 DIAGNOSIS — I10 ESSENTIAL (PRIMARY) HYPERTENSION: ICD-10-CM

## 2017-10-11 DIAGNOSIS — M47.26 OTHER SPONDYLOSIS WITH RADICULOPATHY, LUMBAR REGION: ICD-10-CM

## 2017-10-11 DIAGNOSIS — M43.16 SPONDYLOLISTHESIS, LUMBAR REGION: ICD-10-CM

## 2017-10-16 ENCOUNTER — MEDICATION RENEWAL (OUTPATIENT)
Age: 72
End: 2017-10-16

## 2017-10-16 DIAGNOSIS — G89.18 OTHER ACUTE POSTPROCEDURAL PAIN: ICD-10-CM

## 2017-10-16 DIAGNOSIS — M62.830 MUSCLE SPASM OF BACK: ICD-10-CM

## 2017-10-16 RX ORDER — CYCLOBENZAPRINE HYDROCHLORIDE 5 MG/1
5 TABLET, FILM COATED ORAL
Qty: 60 | Refills: 0 | Status: DISCONTINUED | COMMUNITY
Start: 2017-10-16 | End: 2017-10-16

## 2017-10-19 ENCOUNTER — FORM ENCOUNTER (OUTPATIENT)
Age: 72
End: 2017-10-19

## 2017-10-20 ENCOUNTER — APPOINTMENT (OUTPATIENT)
Dept: SPINE | Facility: CLINIC | Age: 72
End: 2017-10-20
Payer: MEDICARE

## 2017-10-20 ENCOUNTER — OUTPATIENT (OUTPATIENT)
Dept: OUTPATIENT SERVICES | Facility: HOSPITAL | Age: 72
LOS: 1 days | End: 2017-10-20
Payer: MEDICARE

## 2017-10-20 VITALS — DIASTOLIC BLOOD PRESSURE: 60 MMHG | SYSTOLIC BLOOD PRESSURE: 98 MMHG

## 2017-10-20 VITALS
OXYGEN SATURATION: 95 % | HEIGHT: 67 IN | SYSTOLIC BLOOD PRESSURE: 88 MMHG | TEMPERATURE: 98.2 F | BODY MASS INDEX: 27.94 KG/M2 | WEIGHT: 178 LBS | DIASTOLIC BLOOD PRESSURE: 54 MMHG | HEART RATE: 82 BPM

## 2017-10-20 DIAGNOSIS — Z48.02 ENCOUNTER FOR REMOVAL OF SUTURES: ICD-10-CM

## 2017-10-20 PROCEDURE — 99024 POSTOP FOLLOW-UP VISIT: CPT

## 2017-10-20 PROCEDURE — 72100 X-RAY EXAM L-S SPINE 2/3 VWS: CPT

## 2017-10-20 PROCEDURE — 72100 X-RAY EXAM L-S SPINE 2/3 VWS: CPT | Mod: 26

## 2017-11-29 ENCOUNTER — APPOINTMENT (OUTPATIENT)
Dept: SPINE | Facility: CLINIC | Age: 72
End: 2017-11-29
Payer: MEDICARE

## 2017-11-29 VITALS
BODY MASS INDEX: 27.94 KG/M2 | DIASTOLIC BLOOD PRESSURE: 83 MMHG | WEIGHT: 178 LBS | HEART RATE: 91 BPM | HEIGHT: 67 IN | OXYGEN SATURATION: 98 % | SYSTOLIC BLOOD PRESSURE: 145 MMHG

## 2017-11-29 DIAGNOSIS — Z09 ENCOUNTER FOR FOLLOW-UP EXAMINATION AFTER COMPLETED TREATMENT FOR CONDITIONS OTHER THAN MALIGNANT NEOPLASM: ICD-10-CM

## 2017-11-29 PROCEDURE — 99024 POSTOP FOLLOW-UP VISIT: CPT

## 2017-11-30 PROBLEM — Z09 POSTOP CHECK: Status: ACTIVE | Noted: 2017-10-20

## 2017-11-30 RX ORDER — OXYCODONE AND ACETAMINOPHEN 5; 325 MG/1; MG/1
5-325 TABLET ORAL
Qty: 40 | Refills: 0 | Status: DISCONTINUED | COMMUNITY
Start: 2017-10-16 | End: 2017-11-30

## 2017-12-19 PROCEDURE — A9585: CPT

## 2017-12-19 PROCEDURE — 36415 COLL VENOUS BLD VENIPUNCTURE: CPT

## 2017-12-19 PROCEDURE — 86900 BLOOD TYPING SEROLOGIC ABO: CPT

## 2017-12-19 PROCEDURE — 97530 THERAPEUTIC ACTIVITIES: CPT

## 2017-12-19 PROCEDURE — 80053 COMPREHEN METABOLIC PANEL: CPT

## 2017-12-19 PROCEDURE — 86901 BLOOD TYPING SEROLOGIC RH(D): CPT

## 2017-12-19 PROCEDURE — 74183 MRI ABD W/O CNTR FLWD CNTR: CPT

## 2017-12-19 PROCEDURE — 85025 COMPLETE CBC W/AUTO DIFF WBC: CPT

## 2017-12-19 PROCEDURE — C1713: CPT

## 2017-12-19 PROCEDURE — 97116 GAIT TRAINING THERAPY: CPT

## 2017-12-19 PROCEDURE — C1889: CPT

## 2017-12-19 PROCEDURE — 86850 RBC ANTIBODY SCREEN: CPT

## 2017-12-19 PROCEDURE — 97161 PT EVAL LOW COMPLEX 20 MIN: CPT

## 2017-12-19 PROCEDURE — 76000 FLUOROSCOPY <1 HR PHYS/QHP: CPT

## 2017-12-19 PROCEDURE — 90686 IIV4 VACC NO PRSV 0.5 ML IM: CPT

## 2018-02-20 ENCOUNTER — FORM ENCOUNTER (OUTPATIENT)
Age: 73
End: 2018-02-20

## 2018-02-21 ENCOUNTER — APPOINTMENT (OUTPATIENT)
Dept: SPINE | Facility: CLINIC | Age: 73
End: 2018-02-21
Payer: MEDICARE

## 2018-02-21 ENCOUNTER — OUTPATIENT (OUTPATIENT)
Dept: OUTPATIENT SERVICES | Facility: HOSPITAL | Age: 73
LOS: 1 days | End: 2018-02-21
Payer: MEDICARE

## 2018-02-21 VITALS
SYSTOLIC BLOOD PRESSURE: 159 MMHG | BODY MASS INDEX: 29.66 KG/M2 | OXYGEN SATURATION: 93 % | WEIGHT: 189 LBS | HEART RATE: 72 BPM | HEIGHT: 67 IN | DIASTOLIC BLOOD PRESSURE: 95 MMHG

## 2018-02-21 PROCEDURE — 99214 OFFICE O/P EST MOD 30 MIN: CPT

## 2018-02-21 PROCEDURE — 72100 X-RAY EXAM L-S SPINE 2/3 VWS: CPT | Mod: 26

## 2018-02-21 PROCEDURE — 72100 X-RAY EXAM L-S SPINE 2/3 VWS: CPT

## 2018-05-22 ENCOUNTER — FORM ENCOUNTER (OUTPATIENT)
Age: 73
End: 2018-05-22

## 2018-05-23 ENCOUNTER — OUTPATIENT (OUTPATIENT)
Dept: OUTPATIENT SERVICES | Facility: HOSPITAL | Age: 73
LOS: 1 days | End: 2018-05-23
Payer: MEDICARE

## 2018-05-23 ENCOUNTER — APPOINTMENT (OUTPATIENT)
Dept: SPINE | Facility: CLINIC | Age: 73
End: 2018-05-23
Payer: MEDICARE

## 2018-05-23 VITALS
HEART RATE: 73 BPM | BODY MASS INDEX: 29.51 KG/M2 | SYSTOLIC BLOOD PRESSURE: 169 MMHG | HEIGHT: 67 IN | WEIGHT: 188 LBS | DIASTOLIC BLOOD PRESSURE: 101 MMHG | OXYGEN SATURATION: 98 % | TEMPERATURE: 97.7 F | RESPIRATION RATE: 18 BRPM

## 2018-05-23 DIAGNOSIS — M54.16 RADICULOPATHY, LUMBAR REGION: ICD-10-CM

## 2018-05-23 PROCEDURE — 72100 X-RAY EXAM L-S SPINE 2/3 VWS: CPT

## 2018-05-23 PROCEDURE — 72100 X-RAY EXAM L-S SPINE 2/3 VWS: CPT | Mod: 26

## 2018-05-23 PROCEDURE — 99214 OFFICE O/P EST MOD 30 MIN: CPT

## 2018-05-24 PROBLEM — M54.16 LUMBAR RADICULOPATHY: Status: ACTIVE | Noted: 2017-06-14

## 2018-11-21 ENCOUNTER — APPOINTMENT (OUTPATIENT)
Dept: SPINE | Facility: CLINIC | Age: 73
End: 2018-11-21

## 2018-12-18 ENCOUNTER — FORM ENCOUNTER (OUTPATIENT)
Age: 73
End: 2018-12-18

## 2018-12-19 ENCOUNTER — APPOINTMENT (OUTPATIENT)
Dept: SPINE | Facility: CLINIC | Age: 73
End: 2018-12-19
Payer: MEDICARE

## 2018-12-19 ENCOUNTER — OUTPATIENT (OUTPATIENT)
Dept: OUTPATIENT SERVICES | Facility: HOSPITAL | Age: 73
LOS: 1 days | End: 2018-12-19
Payer: MEDICARE

## 2018-12-19 VITALS
BODY MASS INDEX: 29.51 KG/M2 | OXYGEN SATURATION: 95 % | SYSTOLIC BLOOD PRESSURE: 159 MMHG | DIASTOLIC BLOOD PRESSURE: 91 MMHG | HEIGHT: 67 IN | HEART RATE: 73 BPM | WEIGHT: 188 LBS | RESPIRATION RATE: 16 BRPM

## 2018-12-19 DIAGNOSIS — M48.061 SPINAL STENOSIS, LUMBAR REGION WITHOUT NEUROGENIC CLAUDICATION: ICD-10-CM

## 2018-12-19 DIAGNOSIS — R26.9 UNSPECIFIED ABNORMALITIES OF GAIT AND MOBILITY: ICD-10-CM

## 2018-12-19 DIAGNOSIS — M51.26 OTHER INTERVERTEBRAL DISC DISPLACEMENT, LUMBAR REGION: ICD-10-CM

## 2018-12-19 PROCEDURE — 72100 X-RAY EXAM L-S SPINE 2/3 VWS: CPT | Mod: 26

## 2018-12-19 PROCEDURE — 72100 X-RAY EXAM L-S SPINE 2/3 VWS: CPT

## 2018-12-19 PROCEDURE — 99214 OFFICE O/P EST MOD 30 MIN: CPT

## 2019-03-04 PROBLEM — R41.3 MEMORY LOSS: Status: ACTIVE | Noted: 2017-06-14

## 2019-06-13 ENCOUNTER — APPOINTMENT (OUTPATIENT)
Dept: RADIOLOGY | Facility: HOSPITAL | Age: 74
End: 2019-06-13
Payer: MEDICARE

## 2019-06-13 ENCOUNTER — OUTPATIENT (OUTPATIENT)
Dept: OUTPATIENT SERVICES | Facility: HOSPITAL | Age: 74
LOS: 1 days | End: 2019-06-13
Payer: MEDICARE

## 2019-06-13 DIAGNOSIS — Z00.00 ENCOUNTER FOR GENERAL ADULT MEDICAL EXAMINATION WITHOUT ABNORMAL FINDINGS: ICD-10-CM

## 2019-06-13 PROCEDURE — 77003 FLUOROGUIDE FOR SPINE INJECT: CPT

## 2019-06-13 PROCEDURE — 62270 DX LMBR SPI PNXR: CPT

## 2019-06-13 PROCEDURE — 77003 FLUOROGUIDE FOR SPINE INJECT: CPT | Mod: 26

## 2019-07-15 ENCOUNTER — TRANSCRIPTION ENCOUNTER (OUTPATIENT)
Age: 74
End: 2019-07-15

## 2019-08-13 ENCOUNTER — OUTPATIENT (OUTPATIENT)
Dept: OUTPATIENT SERVICES | Facility: HOSPITAL | Age: 74
LOS: 1 days | End: 2019-08-13
Payer: MEDICARE

## 2019-08-13 VITALS
RESPIRATION RATE: 14 BRPM | TEMPERATURE: 98 F | HEIGHT: 64.75 IN | WEIGHT: 182.1 LBS | OXYGEN SATURATION: 98 % | HEART RATE: 83 BPM | DIASTOLIC BLOOD PRESSURE: 90 MMHG | SYSTOLIC BLOOD PRESSURE: 158 MMHG

## 2019-08-13 DIAGNOSIS — G91.2 (IDIOPATHIC) NORMAL PRESSURE HYDROCEPHALUS: ICD-10-CM

## 2019-08-13 DIAGNOSIS — Z98.49 CATARACT EXTRACTION STATUS, UNSPECIFIED EYE: Chronic | ICD-10-CM

## 2019-08-13 DIAGNOSIS — M48.00 SPINAL STENOSIS, SITE UNSPECIFIED: Chronic | ICD-10-CM

## 2019-08-13 LAB
ANION GAP SERPL CALC-SCNC: 13 MMO/L — SIGNIFICANT CHANGE UP (ref 7–14)
BLD GP AB SCN SERPL QL: NEGATIVE — SIGNIFICANT CHANGE UP
BUN SERPL-MCNC: 16 MG/DL — SIGNIFICANT CHANGE UP (ref 7–23)
CALCIUM SERPL-MCNC: 9.4 MG/DL — SIGNIFICANT CHANGE UP (ref 8.4–10.5)
CHLORIDE SERPL-SCNC: 103 MMOL/L — SIGNIFICANT CHANGE UP (ref 98–107)
CO2 SERPL-SCNC: 24 MMOL/L — SIGNIFICANT CHANGE UP (ref 22–31)
CREAT SERPL-MCNC: 1 MG/DL — SIGNIFICANT CHANGE UP (ref 0.5–1.3)
GLUCOSE SERPL-MCNC: 76 MG/DL — SIGNIFICANT CHANGE UP (ref 70–99)
HCT VFR BLD CALC: 42.2 % — SIGNIFICANT CHANGE UP (ref 39–50)
HGB BLD-MCNC: 13.2 G/DL — SIGNIFICANT CHANGE UP (ref 13–17)
MCHC RBC-ENTMCNC: 22.1 PG — LOW (ref 27–34)
MCHC RBC-ENTMCNC: 31.3 % — LOW (ref 32–36)
MCV RBC AUTO: 70.7 FL — LOW (ref 80–100)
NRBC # FLD: 0 K/UL — SIGNIFICANT CHANGE UP (ref 0–0)
PLATELET # BLD AUTO: 154 K/UL — SIGNIFICANT CHANGE UP (ref 150–400)
PMV BLD: 11.4 FL — SIGNIFICANT CHANGE UP (ref 7–13)
POTASSIUM SERPL-MCNC: 4 MMOL/L — SIGNIFICANT CHANGE UP (ref 3.5–5.3)
POTASSIUM SERPL-SCNC: 4 MMOL/L — SIGNIFICANT CHANGE UP (ref 3.5–5.3)
RBC # BLD: 5.97 M/UL — HIGH (ref 4.2–5.8)
RBC # FLD: 15.9 % — HIGH (ref 10.3–14.5)
RH IG SCN BLD-IMP: POSITIVE — SIGNIFICANT CHANGE UP
SODIUM SERPL-SCNC: 140 MMOL/L — SIGNIFICANT CHANGE UP (ref 135–145)
WBC # BLD: 8.7 K/UL — SIGNIFICANT CHANGE UP (ref 3.8–10.5)
WBC # FLD AUTO: 8.7 K/UL — SIGNIFICANT CHANGE UP (ref 3.8–10.5)

## 2019-08-13 PROCEDURE — 93010 ELECTROCARDIOGRAM REPORT: CPT

## 2019-08-13 NOTE — H&P PST ADULT - ASSESSMENT
DX: (Idiopathic) normal pressure hydrocephalus and evaluated for an insertion of ventriculo peritoneal shunt on 8/19/19.

## 2019-08-13 NOTE — H&P PST ADULT - NEGATIVE GENERAL SYMPTOMS
no weight loss/no polyphagia/no polyuria/no chills/no sweating/no anorexia/no polydipsia/no fever/no weight gain/no malaise

## 2019-08-13 NOTE — H&P PST ADULT - NEGATIVE MUSCULOSKELETAL SYMPTOMS
no myalgia/no muscle cramps/no neck pain/no back pain/no leg pain R/no arthralgia/no muscle weakness/no arm pain R/no leg pain L/no joint swelling/no arm pain L/no arthritis/no stiffness

## 2019-08-13 NOTE — H&P PST ADULT - NSICDXPASTMEDICALHX_GEN_ALL_CORE_FT
PAST MEDICAL HISTORY:  BPH (benign prostatic hyperplasia)     HTN (hypertension)     Lumbar stenosis s/p sx 10/2017

## 2019-08-13 NOTE — H&P PST ADULT - HISTORY OF PRESENT ILLNESS
73 male With hx of HTN, BPH. Presents to PST w/ a preop dx of (Idiopathic) normal pressure hydrocephalus and to be evaluated for an insertion of ventriculo peritoneal shunt on 9/19/19.   pt states " I have water in my brain". Pt states since stenosis issue and sx an MRI done and mild edema to brain noted but monitored, then pt started complaining of dizziness, loss balance, unsteady gait, memory loss 7 mos ago. Went to see his pcp who referred him back to Dr Trevino and MRI repeated, increase in fluid noted and a spinal tap done, fluid drained and felt better. Pt then recommended  shunt for wich is now scheduled. 73 male With hx of HTN, BPH. Presents to PST w/ a preop dx of (Idiopathic) normal pressure hydrocephalus and to be evaluated for an insertion of ventriculo peritoneal shunt on 8/19/19.   pt states " I have water in my brain since stenosis issue on 2017"  An MRI done back on 2017 and mild edema to brain noted but monitored, then pt started complaining of dizziness, loss balance, unsteady gait, memory loss 7 mos ago. Went to see his pcp who referred him back to Dr Trevino and MRI repeated, increase in fluid noted and a spinal tap done, fluid drained and felt better. Pt then recommended  shunt for which is now scheduled.

## 2019-08-13 NOTE — H&P PST ADULT - NSANTHOSAYNRD_GEN_A_CORE
No. CATHERINE screening performed.  STOP BANG Legend: 0-2 = LOW Risk; 3-4 = INTERMEDIATE Risk; 5-8 = HIGH Risk/Never tested

## 2019-08-13 NOTE — H&P PST ADULT - CARDIOVASCULAR DETAILS
positive S2/positive S1 click from mechanical valve noted on assessment/positive S1/murmur/positive S2

## 2019-08-13 NOTE — H&P PST ADULT - NSICDXPROBLEM_GEN_ALL_CORE_FT
PROBLEM DIAGNOSES  Problem: Idiopathic normal pressure hydrocephalus  Assessment and Plan: Pt evaluated for an insertion of ventriculo peritoneal shunt on 8/19/19. Preop instructions provided including NPO status, Pepcid and Hibiclens wash. C/W enalapril, HCTZ, zoloft, Flomax, tamsulosin as ordered and may take in am dos. (aware of need to take bp meds in am dos, denies increased diuresis if taking hctz). MC pending, Form provided.   Meets CATHERINE precautions, OR booking notified.

## 2019-08-13 NOTE — H&P PST ADULT - RS GEN PE MLT RESP DETAILS PC
respirations non-labored/clear to auscultation bilaterally/no intercostal retractions/no rales/good air movement/airway patent/breath sounds equal/normal/no rhonchi/no wheezes/no chest wall tenderness/no subcutaneous emphysema

## 2019-08-13 NOTE — H&P PST ADULT - NEGATIVE CARDIOVASCULAR SYMPTOMS
no chest pain/no dyspnea on exertion/no orthopnea/no claudication/no paroxysmal nocturnal dyspnea/no peripheral edema/no palpitations

## 2019-08-13 NOTE — H&P PST ADULT - NEGATIVE NEUROLOGICAL SYMPTOMS
no vertigo/no headache/no syncope/no transient paralysis/no tremors/no loss of sensation/no facial palsy/no loss of consciousness/no hemiparesis/no focal seizures/no generalized seizures

## 2019-08-13 NOTE — H&P PST ADULT - NSCAFFEINETYPE_GEN_ALL_CORE_SD
Quality 110: Preventive Care And Screening: Influenza Immunization: Influenza Immunization Administered during Influenza season Detail Level: Detailed Quality 130: Documentation Of Current Medications In The Medical Record: Current Medications Documented Quality 111:Pneumonia Vaccination Status For Older Adults: Pneumococcal Vaccination not Administered or Previously Received, Reason not Otherwise Specified Quality 226: Preventive Care And Screening: Tobacco Use: Screening And Cessation Intervention: Patient screened for tobacco and is a smoker AND received Cessation Counseling Quality 431: Preventive Care And Screening: Unhealthy Alcohol Use - Screening: Patient screened for unhealthy alcohol use using a single question and scores less than 2 times per year Quality 131: Pain Assessment And Follow-Up: Pain assessment using a standardized tool is documented as negative, no follow-up plan required coffee/tea

## 2019-08-16 ENCOUNTER — INPATIENT (INPATIENT)
Facility: HOSPITAL | Age: 74
LOS: 2 days | Discharge: HOME | DRG: 309 | End: 2019-08-19
Attending: INTERNAL MEDICINE | Admitting: INTERNAL MEDICINE
Payer: MEDICARE

## 2019-08-16 VITALS
SYSTOLIC BLOOD PRESSURE: 124 MMHG | HEIGHT: 64 IN | RESPIRATION RATE: 20 BRPM | HEART RATE: 107 BPM | TEMPERATURE: 98 F | OXYGEN SATURATION: 96 % | DIASTOLIC BLOOD PRESSURE: 71 MMHG | WEIGHT: 184.09 LBS

## 2019-08-16 DIAGNOSIS — I10 ESSENTIAL (PRIMARY) HYPERTENSION: ICD-10-CM

## 2019-08-16 DIAGNOSIS — N40.0 BENIGN PROSTATIC HYPERPLASIA WITHOUT LOWER URINARY TRACT SYMPTOMS: ICD-10-CM

## 2019-08-16 DIAGNOSIS — Z98.49 CATARACT EXTRACTION STATUS, UNSPECIFIED EYE: Chronic | ICD-10-CM

## 2019-08-16 DIAGNOSIS — I48.0 PAROXYSMAL ATRIAL FIBRILLATION: ICD-10-CM

## 2019-08-16 DIAGNOSIS — M48.00 SPINAL STENOSIS, SITE UNSPECIFIED: Chronic | ICD-10-CM

## 2019-08-16 DIAGNOSIS — R26.2 DIFFICULTY IN WALKING, NOT ELSEWHERE CLASSIFIED: ICD-10-CM

## 2019-08-16 DIAGNOSIS — Z29.9 ENCOUNTER FOR PROPHYLACTIC MEASURES, UNSPECIFIED: ICD-10-CM

## 2019-08-16 PROBLEM — M48.06 SPINAL STENOSIS, LUMBAR REGION: Chronic | Status: ACTIVE | Noted: 2017-10-05

## 2019-08-16 LAB
ALBUMIN SERPL ELPH-MCNC: 3.2 G/DL — LOW (ref 3.5–5)
ALP SERPL-CCNC: 110 U/L — SIGNIFICANT CHANGE UP (ref 40–120)
ALT FLD-CCNC: 20 U/L DA — SIGNIFICANT CHANGE UP (ref 10–60)
ANION GAP SERPL CALC-SCNC: 2 MMOL/L — LOW (ref 5–17)
APTT BLD: 31.1 SEC — SIGNIFICANT CHANGE UP (ref 27.5–36.3)
AST SERPL-CCNC: 14 U/L — SIGNIFICANT CHANGE UP (ref 10–40)
BILIRUB SERPL-MCNC: 1 MG/DL — SIGNIFICANT CHANGE UP (ref 0.2–1.2)
BUN SERPL-MCNC: 14 MG/DL — SIGNIFICANT CHANGE UP (ref 7–18)
CALCIUM SERPL-MCNC: 8.7 MG/DL — SIGNIFICANT CHANGE UP (ref 8.4–10.5)
CHLORIDE SERPL-SCNC: 106 MMOL/L — SIGNIFICANT CHANGE UP (ref 96–108)
CO2 SERPL-SCNC: 29 MMOL/L — SIGNIFICANT CHANGE UP (ref 22–31)
CREAT SERPL-MCNC: 0.93 MG/DL — SIGNIFICANT CHANGE UP (ref 0.5–1.3)
GLUCOSE SERPL-MCNC: 72 MG/DL — SIGNIFICANT CHANGE UP (ref 70–99)
HCT VFR BLD CALC: 44.3 % — SIGNIFICANT CHANGE UP (ref 39–50)
HGB BLD-MCNC: 14 G/DL — SIGNIFICANT CHANGE UP (ref 13–17)
INR BLD: 1.08 RATIO — SIGNIFICANT CHANGE UP (ref 0.88–1.16)
MCHC RBC-ENTMCNC: 22.5 PG — LOW (ref 27–34)
MCHC RBC-ENTMCNC: 31.6 GM/DL — LOW (ref 32–36)
MCV RBC AUTO: 71.3 FL — LOW (ref 80–100)
NRBC # BLD: 0 /100 WBCS — SIGNIFICANT CHANGE UP (ref 0–0)
PLATELET # BLD AUTO: 145 K/UL — LOW (ref 150–400)
POTASSIUM SERPL-MCNC: 3.5 MMOL/L — SIGNIFICANT CHANGE UP (ref 3.5–5.3)
POTASSIUM SERPL-SCNC: 3.5 MMOL/L — SIGNIFICANT CHANGE UP (ref 3.5–5.3)
PROT SERPL-MCNC: 7.3 G/DL — SIGNIFICANT CHANGE UP (ref 6–8.3)
PROTHROM AB SERPL-ACNC: 12 SEC — SIGNIFICANT CHANGE UP (ref 10–12.9)
RBC # BLD: 6.21 M/UL — HIGH (ref 4.2–5.8)
RBC # FLD: 16 % — HIGH (ref 10.3–14.5)
SODIUM SERPL-SCNC: 137 MMOL/L — SIGNIFICANT CHANGE UP (ref 135–145)
TROPONIN I SERPL-MCNC: <0.015 NG/ML — SIGNIFICANT CHANGE UP (ref 0–0.04)
WBC # BLD: 9.4 K/UL — SIGNIFICANT CHANGE UP (ref 3.8–10.5)
WBC # FLD AUTO: 9.4 K/UL — SIGNIFICANT CHANGE UP (ref 3.8–10.5)

## 2019-08-16 PROCEDURE — 99283 EMERGENCY DEPT VISIT LOW MDM: CPT

## 2019-08-16 RX ORDER — TAMSULOSIN HYDROCHLORIDE 0.4 MG/1
0.4 CAPSULE ORAL AT BEDTIME
Refills: 0 | Status: DISCONTINUED | OUTPATIENT
Start: 2019-08-16 | End: 2019-08-19

## 2019-08-16 RX ORDER — ENOXAPARIN SODIUM 100 MG/ML
40 INJECTION SUBCUTANEOUS DAILY
Refills: 0 | Status: DISCONTINUED | OUTPATIENT
Start: 2019-08-16 | End: 2019-08-19

## 2019-08-16 RX ORDER — FINASTERIDE 5 MG/1
5 TABLET, FILM COATED ORAL DAILY
Refills: 0 | Status: DISCONTINUED | OUTPATIENT
Start: 2019-08-16 | End: 2019-08-19

## 2019-08-16 RX ORDER — HYDROCHLOROTHIAZIDE 25 MG
25 TABLET ORAL DAILY
Refills: 0 | Status: DISCONTINUED | OUTPATIENT
Start: 2019-08-16 | End: 2019-08-19

## 2019-08-16 RX ORDER — METOPROLOL TARTRATE 50 MG
5 TABLET ORAL EVERY 6 HOURS
Refills: 0 | Status: DISCONTINUED | OUTPATIENT
Start: 2019-08-16 | End: 2019-08-19

## 2019-08-16 RX ORDER — SERTRALINE 25 MG/1
50 TABLET, FILM COATED ORAL DAILY
Refills: 0 | Status: DISCONTINUED | OUTPATIENT
Start: 2019-08-16 | End: 2019-08-19

## 2019-08-16 RX ORDER — METOPROLOL TARTRATE 50 MG
12.5 TABLET ORAL
Refills: 0 | Status: DISCONTINUED | OUTPATIENT
Start: 2019-08-16 | End: 2019-08-19

## 2019-08-16 RX ADMIN — Medication 20 MILLIGRAM(S): at 16:12

## 2019-08-16 RX ADMIN — Medication 12.5 MILLIGRAM(S): at 17:38

## 2019-08-16 RX ADMIN — TAMSULOSIN HYDROCHLORIDE 0.4 MILLIGRAM(S): 0.4 CAPSULE ORAL at 21:11

## 2019-08-16 NOTE — H&P ADULT - NSHPLABSRESULTS_GEN_ALL_CORE
LABS:                        14.0   9.40  )-----------( 145      ( 16 Aug 2019 13:13 )             44.3     08-16    137  |  106  |  14  ----------------------------<  72  3.5   |  29  |  0.93    Ca    8.7      16 Aug 2019 13:13    TPro  7.3  /  Alb  3.2<L>  /  TBili  1.0  /  DBili  x   /  AST  14  /  ALT  20  /  AlkPhos  110  08-16    PT/INR - ( 16 Aug 2019 13:13 )   PT: 12.0 sec;   INR: 1.08 ratio         PTT - ( 16 Aug 2019 13:13 )  PTT:31.1 sec    LIVER FUNCTIONS - ( 16 Aug 2019 13:13 )  Alb: 3.2 g/dL / Pro: 7.3 g/dL / ALK PHOS: 110 U/L / ALT: 20 U/L DA / AST: 14 U/L / GGT: x

## 2019-08-16 NOTE — ED PROVIDER NOTE - OBJECTIVE STATEMENT
72 y/o M/F with PMHx of BPH, HTN, Normal Pressure Hydrocephalus, Lumbar Stenosis and PSHx of Cataract Surgery, SS c/o palpitations for the past hour. Pt relates sudden onset of sxs when walking around. Pt related previous episode of Afib 7 years ago that was Tx.  However, pt is not currently on any anticoagulants or antiarrhythmics. Pt is scheduled for a  Shunt Placement on 8/19. Pt denies CP, SOB, fever, HA, dizziness or any other acute complaints. NKDA. 74 y/o M with PMHx of BPH, HTN, Normal Pressure Hydrocephalus, Lumbar Stenosis and PSHx of Cataract Surgery, SS c/o palpitations for the past hour. Pt relates sudden onset of sxs when walking around. Pt related previous episode of Afib 7 years ago that was Tx. However, pt is not currently on any anticoagulants or antiarrhythmics. Pt is scheduled for a  Shunt Placement on 8/19. Pt denies CP, SOB, fever, HA, dizziness or any other acute complaints. NKDA. 74 y/o M with PMHx of BPH, HTN, Normal Pressure Hydrocephalus, Lumbar Stenosis and PSHx of Cataract Surgery, SS c/o palpitations for the past hour. Pt relates sudden onset of sxs when walking around. Pt related previous episode of Afib 7 years ago that was Treated. However, pt is not currently on any anticoagulants or antiarrhythmics. Pt is scheduled for a  Shunt Placement on 8/19. Pt denies CP, SOB, fever, HA, dizziness or any other acute complaints. NKDA.

## 2019-08-16 NOTE — H&P ADULT - PROBLEM SELECTOR PLAN 4
IMPROVE VTE Individual Risk Assessment    RISK                                                                Points  [  ] Previous VTE                                                  3  [  ] Thrombophilia                                               2  [  ] Lower limb paralysis                                      2        (unable to hold up >15 seconds)    [  ] Current Cancer                                              2         (within 6 months)  [x ] Immobilization > 24 hrs                                1  [  ] ICU/CCU stay > 24 hours                              1  [x  ] Age > 60                                                      1  IMPROVE VTE Score _2__DVT ppx Lovenox 40 sub q______  ) Most likely due to NPH  F/U PT

## 2019-08-16 NOTE — ED PROVIDER NOTE - PMH
(Idiopathic) normal pressure hydrocephalus    BPH (benign prostatic hyperplasia)    HTN (hypertension)    Lumbar stenosis  s/p sx 10/2017

## 2019-08-16 NOTE — H&P ADULT - ASSESSMENT
74 y/o M with PMHx of BPH, HTN, Normal Pressure Hydrocephalus, Lumbar Stenosis s/p surgery and PSHx of Cataract Surgery,  c/o palpitations when he was having breakfast. Pt related previous episode of Afib 7 years ago that was Tx.  Pt is admitted to telemetry for evaluation of palpitations.

## 2019-08-16 NOTE — H&P ADULT - NSHPREVIEWOFSYSTEMS_GEN_ALL_CORE
REVIEW OF SYSTEMS:    CONSTITUTIONAL: Generalized weakness  EYES/ENT: No visual changes;  No vertigo or throat pain   NECK: No pain or stiffness  RESPIRATORY: No cough, wheezing, hemoptysis; No shortness of breath  CARDIOVASCULAR: No chest pain or palpitations  GASTROINTESTINAL: No abdominal or epigastric pain. No nausea, vomiting, or hematemesis; No diarrhea or constipation. No melena or hematochezia.  GENITOURINARY: No dysuria, frequency or hematuria  NEUROLOGICAL: No numbness or weakness  SKIN: No itching, burning, rashes, or lesions   All other review of systems is negative unless indicated above.
I will STOP taking the medications listed below when I get home from the hospital:  None

## 2019-08-16 NOTE — H&P ADULT - PROBLEM SELECTOR PLAN 1
-Pt had palpitations, Tachycardia.  EKG showed showed Afib with RVR   [id number] VASC Score of:_____2________ Stroker Risk of  2   %  ATRIA stroke risk 4, low risk for ischemic stroke  - Not starting on Anticoagulation as the pt is at risk of falls due to NPH and gait imbalance.  Lopressor 5mg iv prn  started on metoprolol 12.5 mg q 12  f/u ECHO   Cardiology Consulted Dr. Serrato -Pt had palpitations, Tachycardia.  EKG showed showed Afib with RVR   [id number] VASC Score of:_____2________ Stroker Risk of  2   %  ATRIA stroke risk 4, low risk for ischemic stroke  - Not starting on Anticoagulation as the pt is at risk of falls due to NPH and gait imbalance.  Lopressor 5mg iv prn  started on metoprolol 12.5 mg q 12  T1, T2 negative, F/U T3  f/u ECHO   Cardiology Consulted Dr. Serrato

## 2019-08-17 LAB
24R-OH-CALCIDIOL SERPL-MCNC: 19 NG/ML — LOW (ref 30–80)
ANION GAP SERPL CALC-SCNC: 4 MMOL/L — LOW (ref 5–17)
BUN SERPL-MCNC: 15 MG/DL — SIGNIFICANT CHANGE UP (ref 7–18)
CALCIUM SERPL-MCNC: 8.6 MG/DL — SIGNIFICANT CHANGE UP (ref 8.4–10.5)
CHLORIDE SERPL-SCNC: 103 MMOL/L — SIGNIFICANT CHANGE UP (ref 96–108)
CHOLEST SERPL-MCNC: 170 MG/DL — SIGNIFICANT CHANGE UP (ref 10–199)
CK MB BLD-MCNC: 2 % — SIGNIFICANT CHANGE UP (ref 0–3.5)
CK MB CFR SERPL CALC: 1.4 NG/ML — SIGNIFICANT CHANGE UP (ref 0–3.6)
CK SERPL-CCNC: 70 U/L — SIGNIFICANT CHANGE UP (ref 35–232)
CO2 SERPL-SCNC: 29 MMOL/L — SIGNIFICANT CHANGE UP (ref 22–31)
CREAT SERPL-MCNC: 0.93 MG/DL — SIGNIFICANT CHANGE UP (ref 0.5–1.3)
FOLATE SERPL-MCNC: 14.2 NG/ML — SIGNIFICANT CHANGE UP
GLUCOSE SERPL-MCNC: 90 MG/DL — SIGNIFICANT CHANGE UP (ref 70–99)
HBA1C BLD-MCNC: 5.7 % — HIGH (ref 4–5.6)
HCT VFR BLD CALC: 43.3 % — SIGNIFICANT CHANGE UP (ref 39–50)
HCV AB S/CO SERPL IA: 0.14 S/CO — SIGNIFICANT CHANGE UP (ref 0–0.99)
HCV AB SERPL-IMP: SIGNIFICANT CHANGE UP
HDLC SERPL-MCNC: 45 MG/DL — SIGNIFICANT CHANGE UP
HGB BLD-MCNC: 13.6 G/DL — SIGNIFICANT CHANGE UP (ref 13–17)
LIPID PNL WITH DIRECT LDL SERPL: 106 MG/DL — SIGNIFICANT CHANGE UP
MAGNESIUM SERPL-MCNC: 2.4 MG/DL — SIGNIFICANT CHANGE UP (ref 1.6–2.6)
MCHC RBC-ENTMCNC: 22.3 PG — LOW (ref 27–34)
MCHC RBC-ENTMCNC: 31.4 GM/DL — LOW (ref 32–36)
MCV RBC AUTO: 70.9 FL — LOW (ref 80–100)
NRBC # BLD: 0 /100 WBCS — SIGNIFICANT CHANGE UP (ref 0–0)
PHOSPHATE SERPL-MCNC: 2.6 MG/DL — SIGNIFICANT CHANGE UP (ref 2.5–4.5)
PLATELET # BLD AUTO: 150 K/UL — SIGNIFICANT CHANGE UP (ref 150–400)
POTASSIUM SERPL-MCNC: 3.6 MMOL/L — SIGNIFICANT CHANGE UP (ref 3.5–5.3)
POTASSIUM SERPL-SCNC: 3.6 MMOL/L — SIGNIFICANT CHANGE UP (ref 3.5–5.3)
RBC # BLD: 6.11 M/UL — HIGH (ref 4.2–5.8)
RBC # FLD: 15.7 % — HIGH (ref 10.3–14.5)
SODIUM SERPL-SCNC: 136 MMOL/L — SIGNIFICANT CHANGE UP (ref 135–145)
TOTAL CHOLESTEROL/HDL RATIO MEASUREMENT: 3.8 RATIO — SIGNIFICANT CHANGE UP (ref 3.4–9.6)
TRIGL SERPL-MCNC: 94 MG/DL — SIGNIFICANT CHANGE UP (ref 10–149)
TROPONIN I SERPL-MCNC: <0.015 NG/ML — SIGNIFICANT CHANGE UP (ref 0–0.04)
TSH SERPL-MCNC: 1.86 UU/ML — SIGNIFICANT CHANGE UP (ref 0.34–4.82)
VIT B12 SERPL-MCNC: 371 PG/ML — SIGNIFICANT CHANGE UP (ref 232–1245)
WBC # BLD: 9.77 K/UL — SIGNIFICANT CHANGE UP (ref 3.8–10.5)
WBC # FLD AUTO: 9.77 K/UL — SIGNIFICANT CHANGE UP (ref 3.8–10.5)

## 2019-08-17 RX ORDER — ASPIRIN/CALCIUM CARB/MAGNESIUM 324 MG
81 TABLET ORAL DAILY
Refills: 0 | Status: DISCONTINUED | OUTPATIENT
Start: 2019-08-17 | End: 2019-08-19

## 2019-08-17 RX ADMIN — ENOXAPARIN SODIUM 40 MILLIGRAM(S): 100 INJECTION SUBCUTANEOUS at 12:32

## 2019-08-17 RX ADMIN — Medication 81 MILLIGRAM(S): at 12:32

## 2019-08-17 RX ADMIN — FINASTERIDE 5 MILLIGRAM(S): 5 TABLET, FILM COATED ORAL at 12:32

## 2019-08-17 RX ADMIN — Medication 12.5 MILLIGRAM(S): at 05:05

## 2019-08-17 RX ADMIN — SERTRALINE 50 MILLIGRAM(S): 25 TABLET, FILM COATED ORAL at 12:32

## 2019-08-17 RX ADMIN — Medication 25 MILLIGRAM(S): at 05:05

## 2019-08-17 RX ADMIN — Medication 20 MILLIGRAM(S): at 05:05

## 2019-08-17 RX ADMIN — TAMSULOSIN HYDROCHLORIDE 0.4 MILLIGRAM(S): 0.4 CAPSULE ORAL at 21:50

## 2019-08-17 RX ADMIN — Medication 12.5 MILLIGRAM(S): at 17:12

## 2019-08-17 NOTE — PROGRESS NOTE ADULT - PROBLEM SELECTOR PLAN 1
-Pt had palpitations, Tachycardia.  EKG showed showed Afib with RVR   [id number] VASC Score of:_____2________ Stroker Risk of  2   %  ATRIA stroke risk 4, low risk for ischemic stroke  - Not starting on Anticoagulation as the pt is at risk of falls due to NPH and gait imbalance.  Lopressor 5mg iv prn  started on metoprolol 12.5 mg q 12  T1, T2 negative  ECHO performed, pending results   Cardiology Consulted Dr. Serrato

## 2019-08-17 NOTE — CONSULT NOTE ADULT - SUBJECTIVE AND OBJECTIVE BOX
HISTORY OF PRESENT ILLNESS: 72 y/o M with PMHx of BPH, HTN, Normal Pressure Hydrocephalus, Lumbar Stenosis s/p surgery and PSHx of Cataract Surgery,  c/o palpitations when he was having breakfast. Pt related previous episode of Afib 7 years ago that was Tx. However, pt is not currently on any anticoagulants or antiarrhythmics. He is weak in his legs and sharp decline in function since 3-4 months. He cannot walk more than a block. He lost muscle strength and also not ambulating well due to balance problems. He feels whole body is going to fall. Pt is scheduled for a  Shunt Placement on 8/19. Pt denies CP, Urinary incontinence, memory problems, SOB, fever, HA, dizziness or any other acute complaints.    PAST MEDICAL & SURGICAL HISTORY:  (Idiopathic) normal pressure hydrocephalus  Lumbar stenosis: s/p sx 10/2017  BPH (benign prostatic hyperplasia)  HTN (hypertension)  S/P cataract surgery  SS (spinal stenosis)      [ ] Diabetes   [ ] Hypertension  [ ] Hyperlipidemia  [ ] CAD  [ ] PCI  [ ] CABG    PREVIOUS DIAGNOSTIC TESTING:    [ ] Echocardiogram:   [ ]  Catheterization:  [ ] Stress Test:  	    MEDICATIONS:  enalapril 20 milliGRAM(s) Oral daily  enoxaparin Injectable 40 milliGRAM(s) SubCutaneous daily  hydrochlorothiazide 25 milliGRAM(s) Oral daily  metoprolol tartrate 12.5 milliGRAM(s) Oral two times a day  metoprolol tartrate Injectable 5 milliGRAM(s) IV Push every 6 hours PRN  tamsulosin 0.4 milliGRAM(s) Oral at bedtime        sertraline 50 milliGRAM(s) Oral daily      finasteride 5 milliGRAM(s) Oral daily        Allergies    No Known Allergies    Intolerances        FAMILY HISTORY:  FH: HTN (hypertension): parents      SOCIAL HISTORY:    [ ] Non-smoker  [ ] Smoker  [ ] Alcohol      REVIEW OF SYSTEMS:  [ ]chest pain  [  ]shortness of breath  [ xx ]palpitations  [  ]syncope  [ ]near syncope [  ]diplopia  [  ]altered mental status [  ]fevers  [ ]chills [ ]nausea  [ ]vomitting  [ ]abdominal pain  [ ]melena  [ ]BRBPR  [  ]epistaxis  [  ]rash  [  ]lower extremity edema      CONSTITUTIONAL: No fever, weight loss, or fatigue  EYES: No eye pain, visual disturbances, or discharge  ENMT:  No difficulty hearing, tinnitus, vertigo; No sinus or throat pain  NECK: No pain or stiffness  RESPIRATORY: No cough, wheezing, chills or hemoptysis; No Shortness of Breath  CARDIOVASCULAR: No chest pain,  ++++palpitations,    no passing out, dizziness, or leg swelling  GASTROINTESTINAL: No abdominal or epigastric pain. No nausea, vomiting, or hematemesis; No diarrhea or constipation. No melena or hematochezia.  GENITOURINARY: No dysuria, frequency, hematuria, or incontinence  NEUROLOGICAL: No headaches, memory loss, loss of strength, numbness, or tremors  SKIN: No itching, burning, rashes, or lesions   LYMPH Nodes: No enlarged glands  ENDOCRINE: No heat or cold intolerance; No hair loss  MUSCULOSKELETAL: No joint pain or swelling; No muscle, back, or extremity pain  PSYCHIATRIC: No depression, anxiety, mood swings, or difficulty sleeping  HEME/LYMPH: No easy bruising, or bleeding gums  ALLERY AND IMMUNOLOGIC: No hives or eczema	    [x ] All others negative	  [ ] Unable to obtain    PHYSICAL EXAM:  T(C): 36.6 (08-17-19 @ 04:38), Max: 37 (08-16-19 @ 16:32)  HR: 70 (08-17-19 @ 04:38) (63 - 107)  BP: 145/90 (08-17-19 @ 04:38) (111/67 - 145/90)  RR: 19 (08-17-19 @ 04:38) (15 - 20)  SpO2: 96% (08-17-19 @ 04:38) (94% - 97%)  Wt(kg): --  I&O's Summary    16 Aug 2019 07:01  -  17 Aug 2019 07:00  --------------------------------------------------------  IN: 50 mL / OUT: 300 mL / NET: -250 mL        Appearance: Normal	  HEENT:   Normal oral mucosa, PERRL, EOMI	  Lymphatic: No lymphadenopathy  Cardiovascular: Normal S1 S2, No JVD, No murmurs, No edema  Respiratory: Lungs clear to auscultation	  Psychiatry: A & O x 3, Mood & affect appropriate  Gastrointestinal:  Soft, Non-tender, + BS	  Skin: No rashes, No ecchymoses, No cyanosis	  Neurologic: Non-focal  Extremities: Normal range of motion, No clubbing, cyanosis or edema  Vascular: Peripheral pulses palpable 2+ bilaterally    TELEMETRY: 	 NSR 70     ECG:  	Afib 72 , NAD , no acute Ischemia noted     RADIOLOGY:   OTHER: 	  	  LABS:	 	    CARDIAC MARKERS:  Troponin I, Serum: <0.015 ng/mL (08-16 @ 13:13)                                  14.0   9.40  )-----------( 145      ( 16 Aug 2019 13:13 )             44.3     08-16    137  |  106  |  14  ----------------------------<  72  3.5   |  29  |  0.93    Ca    8.7      16 Aug 2019 13:13    TPro  7.3  /  Alb  3.2<L>  /  TBili  1.0  /  DBili  x   /  AST  14  /  ALT  20  /  AlkPhos  110  08-16    proBNP:   Lipid Profile:   HgA1c:   TSH:     ASSESSMENT/PLAN: 	73 yr male hx of hydrocephalus, lumbar stenosis , cataract surgery, PAF, no A/C now with Palpitation , Afib on ECG.    Tele stable overnight HR 70's , NSR  Rate control with Low dose BB,   BP stable    A/C not started , due to HIGH risk for falls.  would like to start low dose ASA .    GI / DVT prophylaxis.   keep K>4, mag >2.0   TSH stable ,  ECHO pending   Cont tele   D/W Dr Serrato HISTORY OF PRESENT ILLNESS: 72 y/o M with PMHx of BPH, HTN, Normal Pressure Hydrocephalus, Lumbar Stenosis s/p surgery and PSHx of Cataract Surgery,  c/o palpitations when he was having breakfast. Pt related previous episode of Afib 7 years ago that was Tx. However, pt is not currently on any anticoagulants or antiarrhythmics. He is weak in his legs and sharp decline in function since 3-4 months. He cannot walk more than a block. He lost muscle strength and also not ambulating well due to balance problems. He feels whole body is going to fall. Pt is scheduled for a  Shunt Placement on 8/19. Pt denies CP, Urinary incontinence, memory problems, SOB, fever, HA, dizziness or any other acute complaints.    PAST MEDICAL & SURGICAL HISTORY:  (Idiopathic) normal pressure hydrocephalus  Lumbar stenosis: s/p sx 10/2017  BPH (benign prostatic hyperplasia)  HTN (hypertension)  S/P cataract surgery  SS (spinal stenosis)      [ ] Diabetes   [ ] Hypertension  [ ] Hyperlipidemia  [ ] CAD  [ ] PCI  [ ] CABG    PREVIOUS DIAGNOSTIC TESTING:    [ ] Echocardiogram:   [ ]  Catheterization:  [ ] Stress Test:  	    MEDICATIONS:  enalapril 20 milliGRAM(s) Oral daily  enoxaparin Injectable 40 milliGRAM(s) SubCutaneous daily  hydrochlorothiazide 25 milliGRAM(s) Oral daily  metoprolol tartrate 12.5 milliGRAM(s) Oral two times a day  metoprolol tartrate Injectable 5 milliGRAM(s) IV Push every 6 hours PRN  tamsulosin 0.4 milliGRAM(s) Oral at bedtime        sertraline 50 milliGRAM(s) Oral daily      finasteride 5 milliGRAM(s) Oral daily        Allergies    No Known Allergies    Intolerances        FAMILY HISTORY:  FH: HTN (hypertension): parents      SOCIAL HISTORY:    [ X] Non-smoker  [ ] Smoker  [ ] Alcohol      REVIEW OF SYSTEMS:  [ ]chest pain  [  ]shortness of breath  [ xx ]palpitations  [  ]syncope  [ ]near syncope [  ]diplopia  [  ]altered mental status [  ]fevers  [ ]chills [ ]nausea  [ ]vomitting  [ ]abdominal pain  [ ]melena  [ ]BRBPR  [  ]epistaxis  [  ]rash  [  ]lower extremity edema      CONSTITUTIONAL: No fever, weight loss, or fatigue  EYES: No eye pain, visual disturbances, or discharge  ENMT:  No difficulty hearing, tinnitus, vertigo; No sinus or throat pain  NECK: No pain or stiffness  RESPIRATORY: No cough, wheezing, chills or hemoptysis; No Shortness of Breath  CARDIOVASCULAR: No chest pain,  ++++palpitations,    no passing out, dizziness, or leg swelling  GASTROINTESTINAL: No abdominal or epigastric pain. No nausea, vomiting, or hematemesis; No diarrhea or constipation. No melena or hematochezia.  GENITOURINARY: No dysuria, frequency, hematuria, or incontinence  NEUROLOGICAL: No headaches, memory loss, loss of strength, numbness, or tremors  SKIN: No itching, burning, rashes, or lesions   LYMPH Nodes: No enlarged glands  ENDOCRINE: No heat or cold intolerance; No hair loss  MUSCULOSKELETAL: No joint pain or swelling; No muscle, back, or extremity pain  PSYCHIATRIC: No depression, anxiety, mood swings, or difficulty sleeping  HEME/LYMPH: No easy bruising, or bleeding gums  ALLERY AND IMMUNOLOGIC: No hives or eczema	    [x ] All others negative	  [ ] Unable to obtain    PHYSICAL EXAM:  T(C): 36.6 (08-17-19 @ 04:38), Max: 37 (08-16-19 @ 16:32)  HR: 70 (08-17-19 @ 04:38) (63 - 107)  BP: 145/90 (08-17-19 @ 04:38) (111/67 - 145/90)  RR: 19 (08-17-19 @ 04:38) (15 - 20)  SpO2: 96% (08-17-19 @ 04:38) (94% - 97%)  Wt(kg): --  I&O's Summary    16 Aug 2019 07:01  -  17 Aug 2019 07:00  --------------------------------------------------------  IN: 50 mL / OUT: 300 mL / NET: -250 mL        Gen: Appears well in NAD  HEENT:  (-)icterus (-)pallor  CV: N S1 S2 1/6 KYLAH (+)2 Pulses B/l  Resp:  Clear to ausculatation B/L, normal effort  GI: (+) BS Soft, NT, ND  Lymph:  (-)Edema, (-)obvious lymphadenopathy  Skin: Warm to touch, Normal turgor  Psych: Appropriate mood and affect      TELEMETRY: 	 NSR 70     ECG:  	Afib , no acute Injury    RADIOLOGY:   OTHER: 	  	  LABS:	 	    CARDIAC MARKERS:  Troponin I, Serum: <0.015 ng/mL (08-16 @ 13:13)                                  14.0   9.40  )-----------( 145      ( 16 Aug 2019 13:13 )             44.3     08-16    137  |  106  |  14  ----------------------------<  72  3.5   |  29  |  0.93    Ca    8.7      16 Aug 2019 13:13    TPro  7.3  /  Alb  3.2<L>  /  TBili  1.0  /  DBili  x   /  AST  14  /  ALT  20  /  AlkPhos  110  08-16    :     ASSESSMENT/PLAN: 	73 yr male hx of hydrocephalus, lumbar stenosis , cataract surgery, PAF, no A/C now with Palpitation , Afib on ECG.    Tele stable overnight HR 70's , NSR  Rate control with Low dose BB,   BP stable    A/C not started , due to HIGH risk for falls.  would like to start low dose ASA .    GI / DVT prophylaxis.   keep K>4, mag >2.0   TSH stable ,  ECHO pending   Cont tele   D/W Dr Serrato

## 2019-08-17 NOTE — PROGRESS NOTE ADULT - ASSESSMENT
72 y/o M with PMHx of BPH, HTN, Normal Pressure Hydrocephalus, Lumbar Stenosis s/p surgery and PSHx of Cataract Surgery,  c/o palpitations when he was having breakfast. Pt related previous episode of Afib 7 years ago that was Tx.  Pt is admitted to telemetry for evaluation of palpitations.

## 2019-08-17 NOTE — CONSULT NOTE ADULT - ATTENDING COMMENTS
Agree with above assessment and plan as outlined above.    - echo  -  Chads-vasc 2, risk of CVA must be weighed with fall risk      John Serrato MD, [facility]  BEEPER [phone]

## 2019-08-17 NOTE — PROGRESS NOTE ADULT - SUBJECTIVE AND OBJECTIVE BOX
PGY 1 Note discussed with supervising resident and primary attending    Patient is a 73y old  Male who presents with a chief complaint of palpitations (17 Aug 2019 07:10)      INTERVAL HPI/OVERNIGHT EVENTS: Patient presented with primary complaint of heart palpitation. Also endorses gait instability, is unable to walk more than 2-3 blocks because of b/l leg weakness. Patient has  shunt scheduled for Monday 8/19/2019    MEDICATIONS  (STANDING):  aspirin  chewable 81 milliGRAM(s) Oral daily  enalapril 20 milliGRAM(s) Oral daily  enoxaparin Injectable 40 milliGRAM(s) SubCutaneous daily  finasteride 5 milliGRAM(s) Oral daily  hydrochlorothiazide 25 milliGRAM(s) Oral daily  metoprolol tartrate 12.5 milliGRAM(s) Oral two times a day  sertraline 50 milliGRAM(s) Oral daily  tamsulosin 0.4 milliGRAM(s) Oral at bedtime    MEDICATIONS  (PRN):  metoprolol tartrate Injectable 5 milliGRAM(s) IV Push every 6 hours PRN palpitations      __________________________________________________  REVIEW OF SYSTEMS:    CONSTITUTIONAL: No fever,   EYES: no acute visual disturbances  NECK: No pain or stiffness  RESPIRATORY: No cough; No shortness of breath  CARDIOVASCULAR: No chest pain, no palpitations  GASTROINTESTINAL: No pain. No nausea or vomiting; No diarrhea   NEUROLOGICAL: No headache or numbness, no tremors  MUSCULOSKELETAL: No joint pain, no muscle pain  GENITOURINARY: no dysuria, no frequency, no hesitancy  PSYCHIATRY: no depression , no anxiety  ALL OTHER  ROS negative        Vital Signs Last 24 Hrs  T(C): 36.7 (17 Aug 2019 11:02), Max: 37 (16 Aug 2019 16:32)  T(F): 98.1 (17 Aug 2019 11:02), Max: 98.6 (16 Aug 2019 16:32)  HR: 65 (17 Aug 2019 11:02) (60 - 96)  BP: 151/83 (17 Aug 2019 11:02) (111/67 - 153/90)  BP(mean): --  RR: 18 (17 Aug 2019 11:02) (15 - 19)  SpO2: 95% (17 Aug 2019 11:02) (94% - 97%)    ________________________________________________  PHYSICAL EXAM:  GENERAL: NAD  HEENT: Normocephalic;  conjunctivae and sclerae clear; moist mucous membranes;   NECK : supple  CHEST/LUNG: Clear to auscultation bilaterally with good air entry   HEART: S1 S2  regular; no murmurs, gallops or rubs  ABDOMEN: Soft, Nontender, Nondistended; Bowel sounds present  EXTREMITIES: no cyanosis; no edema; no calf tenderness  SKIN: warm and dry; no rash  NERVOUS SYSTEM:  Awake and alert; Oriented  to place, person and time ; no new deficits    _________________________________________________  LABS:                        13.6   9.77  )-----------( 150      ( 17 Aug 2019 07:00 )             43.3     08-17    136  |  103  |  15  ----------------------------<  90  3.6   |  29  |  0.93    Ca    8.6      17 Aug 2019 07:00  Phos  2.6     08-17  Mg     2.4     08-17    TPro  7.3  /  Alb  3.2<L>  /  TBili  1.0  /  DBili  x   /  AST  14  /  ALT  20  /  AlkPhos  110  08-16    PT/INR - ( 16 Aug 2019 13:13 )   PT: 12.0 sec;   INR: 1.08 ratio         PTT - ( 16 Aug 2019 13:13 )  PTT:31.1 sec    CAPILLARY BLOOD GLUCOSE            RADIOLOGY & ADDITIONAL TESTS:    Imaging Personally Reviewed:  YES/NO    Consultant(s) Notes Reviewed:   YES/ No    Care Discussed with Consultants :     Plan of care was discussed with patient and /or primary care giver; all questions and concerns were addressed and care was aligned with patient's wishes.

## 2019-08-18 LAB
ANION GAP SERPL CALC-SCNC: 4 MMOL/L — LOW (ref 5–17)
BUN SERPL-MCNC: 38 MG/DL — HIGH (ref 7–18)
CALCIUM SERPL-MCNC: 8.8 MG/DL — SIGNIFICANT CHANGE UP (ref 8.4–10.5)
CHLORIDE SERPL-SCNC: 111 MMOL/L — HIGH (ref 96–108)
CO2 SERPL-SCNC: 27 MMOL/L — SIGNIFICANT CHANGE UP (ref 22–31)
CREAT SERPL-MCNC: 0.94 MG/DL — SIGNIFICANT CHANGE UP (ref 0.5–1.3)
GLUCOSE SERPL-MCNC: 143 MG/DL — HIGH (ref 70–99)
HCT VFR BLD CALC: 24.2 % — LOW (ref 39–50)
HCT VFR BLD CALC: 43.9 % — SIGNIFICANT CHANGE UP (ref 39–50)
HGB BLD-MCNC: 14.1 G/DL — SIGNIFICANT CHANGE UP (ref 13–17)
HGB BLD-MCNC: 7.4 G/DL — LOW (ref 13–17)
MCHC RBC-ENTMCNC: 22.6 PG — LOW (ref 27–34)
MCHC RBC-ENTMCNC: 30.6 GM/DL — LOW (ref 32–36)
MCHC RBC-ENTMCNC: 30.8 PG — SIGNIFICANT CHANGE UP (ref 27–34)
MCHC RBC-ENTMCNC: 32.1 GM/DL — SIGNIFICANT CHANGE UP (ref 32–36)
MCV RBC AUTO: 100.8 FL — HIGH (ref 80–100)
MCV RBC AUTO: 70.5 FL — LOW (ref 80–100)
NRBC # BLD: 0 /100 WBCS — SIGNIFICANT CHANGE UP (ref 0–0)
NRBC # BLD: 0 /100 WBCS — SIGNIFICANT CHANGE UP (ref 0–0)
PLATELET # BLD AUTO: 156 K/UL — SIGNIFICANT CHANGE UP (ref 150–400)
PLATELET # BLD AUTO: 251 K/UL — SIGNIFICANT CHANGE UP (ref 150–400)
POTASSIUM SERPL-MCNC: 3.9 MMOL/L — SIGNIFICANT CHANGE UP (ref 3.5–5.3)
POTASSIUM SERPL-SCNC: 3.9 MMOL/L — SIGNIFICANT CHANGE UP (ref 3.5–5.3)
RBC # BLD: 2.4 M/UL — LOW (ref 4.2–5.8)
RBC # BLD: 6.23 M/UL — HIGH (ref 4.2–5.8)
RBC # FLD: 14.9 % — HIGH (ref 10.3–14.5)
RBC # FLD: 16.1 % — HIGH (ref 10.3–14.5)
SODIUM SERPL-SCNC: 142 MMOL/L — SIGNIFICANT CHANGE UP (ref 135–145)
WBC # BLD: 10.57 K/UL — HIGH (ref 3.8–10.5)
WBC # BLD: 7.78 K/UL — SIGNIFICANT CHANGE UP (ref 3.8–10.5)
WBC # FLD AUTO: 10.57 K/UL — HIGH (ref 3.8–10.5)
WBC # FLD AUTO: 7.78 K/UL — SIGNIFICANT CHANGE UP (ref 3.8–10.5)

## 2019-08-18 RX ADMIN — FINASTERIDE 5 MILLIGRAM(S): 5 TABLET, FILM COATED ORAL at 11:30

## 2019-08-18 RX ADMIN — Medication 20 MILLIGRAM(S): at 06:13

## 2019-08-18 RX ADMIN — Medication 25 MILLIGRAM(S): at 06:13

## 2019-08-18 RX ADMIN — Medication 81 MILLIGRAM(S): at 11:30

## 2019-08-18 RX ADMIN — Medication 12.5 MILLIGRAM(S): at 06:13

## 2019-08-18 RX ADMIN — Medication 12.5 MILLIGRAM(S): at 17:31

## 2019-08-18 RX ADMIN — TAMSULOSIN HYDROCHLORIDE 0.4 MILLIGRAM(S): 0.4 CAPSULE ORAL at 21:53

## 2019-08-18 RX ADMIN — SERTRALINE 50 MILLIGRAM(S): 25 TABLET, FILM COATED ORAL at 11:30

## 2019-08-18 RX ADMIN — ENOXAPARIN SODIUM 40 MILLIGRAM(S): 100 INJECTION SUBCUTANEOUS at 11:30

## 2019-08-18 NOTE — PROGRESS NOTE ADULT - SUBJECTIVE AND OBJECTIVE BOX
aspirin  chewable 81 milliGRAM(s) Oral daily  enalapril 20 milliGRAM(s) Oral daily  enoxaparin Injectable 40 milliGRAM(s) SubCutaneous daily  finasteride 5 milliGRAM(s) Oral daily  hydrochlorothiazide 25 milliGRAM(s) Oral daily  metoprolol tartrate 12.5 milliGRAM(s) Oral two times a day  metoprolol tartrate Injectable 5 milliGRAM(s) IV Push every 6 hours PRN  sertraline 50 milliGRAM(s) Oral daily  tamsulosin 0.4 milliGRAM(s) Oral at bedtime                            7.4    10.57 )-----------( 251      ( 18 Aug 2019 06:07 )             24.2       Hemoglobin: 7.4 g/dL (08-18 @ 06:07)  Hemoglobin: 13.6 g/dL (08-17 @ 07:00)  Hemoglobin: 14.0 g/dL (08-16 @ 13:13)  Hemoglobin: 13.2 g/dL (08-13 @ 17:10)      08-18    142  |  111<H>  |  38<H>  ----------------------------<  143<H>  3.9   |  27  |  0.94    Ca    8.8      18 Aug 2019 06:07  Phos  2.6     08-17  Mg     2.4     08-17    TPro  7.3  /  Alb  3.2<L>  /  TBili  1.0  /  DBili  x   /  AST  14  /  ALT  20  /  AlkPhos  110  08-16    Creatinine Trend: 0.94<--, 0.93<--, 0.93<--, 1.00<--    COAGS:     CARDIAC MARKERS ( 17 Aug 2019 07:00 )  <0.015 ng/mL / x     / 70 U/L / x     / 1.4 ng/mL  CARDIAC MARKERS ( 16 Aug 2019 13:13 )  <0.015 ng/mL / x     / x     / x     / x            T(C): 36.7 (08-18-19 @ 05:00), Max: 37.2 (08-17-19 @ 19:51)  HR: 66 (08-18-19 @ 05:00) (65 - 66)  BP: 155/96 (08-18-19 @ 05:00) (142/75 - 190/102)  RR: 18 (08-18-19 @ 05:00) (18 - 18)  SpO2: 94% (08-18-19 @ 05:00) (93% - 95%)  Wt(kg): --    I&O's Summary        Gen: Appears well in NAD  HEENT:  (-)icterus (-)pallor  CV: N S1 S2 1/6 KYLAH (+)2 Pulses B/l  Resp:  Clear to ausculatation B/L, normal effort  GI: (+) BS Soft, NT, ND  Lymph:  (-)Edema, (-)obvious lymphadenopathy  Skin: Warm to touch, Normal turgor  Psych: Appropriate mood and affect      TELEMETRY: 	 NSR 70     ECG:  	Afib , no acute Injury    RADIOLOGY:   OTHER: 	  	  LABS:	 	    CARDIAC MARKERS:  Troponin I, Serum: <0.015 ng/mL (08-16 @ 13:13)                                  14.0   9.40  )-----------( 145      ( 16 Aug 2019 13:13 )             44.3     08-16    137  |  106  |  14  ----------------------------<  72  3.5   |  29  |  0.93    Ca    8.7      16 Aug 2019 13:13    TPro  7.3  /  Alb  3.2<L>  /  TBili  1.0  /  DBili  x   /  AST  14  /  ALT  20  /  AlkPhos  110  08-16    :     ASSESSMENT/PLAN: 	73 yr male hx of hydrocephalus, lumbar stenosis , cataract surgery, PAF, no A/C now with Palpitation , Afib on ECG.    Tele stable, NSR 60's   cont Low dose BB,    A/C not started , due to HIGH risk for falls ( risk benefit with fall risk )  ASA 81 mg qd, statin    GI / DVT prophylaxis,  keep K>4, mag >2.0    ECHO pending   D/W Dr Serrato

## 2019-08-18 NOTE — PROGRESS NOTE ADULT - ATTENDING COMMENTS
Patient seen and examined, agree with above assessment and plan as transcribed above.    - Patient and family remain concerned regarding anticoagulation given his frequent falls   - May reconsider AC if his NPH / gait instability improves  - Stress in am    John Serrato MD, FACC  BEEPER [phone]

## 2019-08-18 NOTE — PROGRESS NOTE ADULT - SUBJECTIVE AND OBJECTIVE BOX
INTERVAL HPI/OVERNIGHT EVENTS:  event noticed,no acute issues,no bleeding  VITAL SIGNS:  T(F): 98 (08-18-19 @ 07:03)  HR: 61 (08-18-19 @ 07:03)  BP: 127/81 (08-18-19 @ 07:03)  RR: 18 (08-18-19 @ 07:03)  SpO2: 93% (08-18-19 @ 07:03)  Wt(kg): --    PHYSICAL EXAM:  awake  Constitutional:  Eyes:  ENMT:perrla  Neck:  Respiratory:clear  Cardiovascular:s1 s2,m-none  Gastrointestinal:soft,bs pos  Extremities:  Vascular:  Neurological:no focal deficit  Musculoskeletal:    MEDICATIONS  (STANDING):  aspirin  chewable 81 milliGRAM(s) Oral daily  enalapril 20 milliGRAM(s) Oral daily  enoxaparin Injectable 40 milliGRAM(s) SubCutaneous daily  finasteride 5 milliGRAM(s) Oral daily  hydrochlorothiazide 25 milliGRAM(s) Oral daily  metoprolol tartrate 12.5 milliGRAM(s) Oral two times a day  sertraline 50 milliGRAM(s) Oral daily  tamsulosin 0.4 milliGRAM(s) Oral at bedtime    MEDICATIONS  (PRN):  metoprolol tartrate Injectable 5 milliGRAM(s) IV Push every 6 hours PRN palpitations      Allergies    No Known Allergies    Intolerances        LABS:                        7.4    10.57 )-----------( 251      ( 18 Aug 2019 06:07 )             24.2     08-18    142  |  111<H>  |  38<H>  ----------------------------<  143<H>  3.9   |  27  |  0.94    Ca    8.8      18 Aug 2019 06:07  Phos  2.6     08-17  Mg     2.4     08-17    TPro  7.3  /  Alb  3.2<L>  /  TBili  1.0  /  DBili  x   /  AST  14  /  ALT  20  /  AlkPhos  110  08-16    PT/INR - ( 16 Aug 2019 13:13 )   PT: 12.0 sec;   INR: 1.08 ratio         PTT - ( 16 Aug 2019 13:13 )  PTT:31.1 sec    Assessment and Plan:   · Assessment		  74 y/o M with PMHx of BPH, HTN, Normal Pressure Hydrocephalus, Lumbar Stenosis s/p surgery and PSHx of Cataract Surgery,  c/o palpitations when he was having breakfast. Pt related previous episode of Afib 7 years ago that was Tx.  Pt is admitted to telemetry for evaluation of palpitations.     Problem/Plan - 1:  ·  Problem: Paroxysmal atrial fibrillation  EKG showed showed Afib with RVR  cont tele-  - Not starting on Anticoagulation as the pt is at risk of falls due to NPH and gait imbalance.  Lopressor 5mg iv prn  started on metoprolol 12.5 mg q 12  T1, T2 negative  ECHO performed, pending results   Cardiology Consulted Dr. Serrato.   -H/H dropped-will repeat b/w.no evidence of bleeding     Problem/Plan - 2:  ·  Problem: BPH (benign prostatic hyperplasia).  Plan: c/w Finasteride  c/w Tamsulosin.      Problem/Plan - 3:  ·  Problem: HTN (hypertension).  Plan: c/w Enalapril  BP WNL upon evaluation  DASH diet.      Problem/Plan - 4:  ·  Problem: Ambulatory dysfunction.  Plan: Most likely due to NPH  PT ordered.      Problem/Plan - 5:  ·  Problem: Prophylactic measure.  Plan: IMPROVE VTE Individual Risk Assessment    RISK                                                                Points  [  ] Previous VTE                                                  3  [  ] Thrombophilia                                               2  [  ] Lower limb paralysis                                      2        (unable to hold up >15 seconds)    [  ] Current Cancer                                              2         (within 6 months)  [x ] Immobilization > 24 hrs                                1  [  ] ICU/CCU stay > 24 hours                              1  [x  ] Age > 60                                                      1  IMPROVE VTE Score _2__DVT ppx Lovenox 40 sub q______  ).

## 2019-08-19 ENCOUNTER — TRANSCRIPTION ENCOUNTER (OUTPATIENT)
Age: 74
End: 2019-08-19

## 2019-08-19 VITALS
DIASTOLIC BLOOD PRESSURE: 78 MMHG | HEART RATE: 77 BPM | TEMPERATURE: 98 F | SYSTOLIC BLOOD PRESSURE: 130 MMHG | OXYGEN SATURATION: 94 % | RESPIRATION RATE: 18 BRPM

## 2019-08-19 DIAGNOSIS — G91.2 (IDIOPATHIC) NORMAL PRESSURE HYDROCEPHALUS: ICD-10-CM

## 2019-08-19 LAB
ANION GAP SERPL CALC-SCNC: 4 MMOL/L — LOW (ref 5–17)
BUN SERPL-MCNC: 18 MG/DL — SIGNIFICANT CHANGE UP (ref 7–18)
CALCIUM SERPL-MCNC: 8.9 MG/DL — SIGNIFICANT CHANGE UP (ref 8.4–10.5)
CHLORIDE SERPL-SCNC: 103 MMOL/L — SIGNIFICANT CHANGE UP (ref 96–108)
CO2 SERPL-SCNC: 29 MMOL/L — SIGNIFICANT CHANGE UP (ref 22–31)
CREAT SERPL-MCNC: 0.92 MG/DL — SIGNIFICANT CHANGE UP (ref 0.5–1.3)
GLUCOSE SERPL-MCNC: 91 MG/DL — SIGNIFICANT CHANGE UP (ref 70–99)
HCT VFR BLD CALC: 44.3 % — SIGNIFICANT CHANGE UP (ref 39–50)
HGB BLD-MCNC: 14.2 G/DL — SIGNIFICANT CHANGE UP (ref 13–17)
MCHC RBC-ENTMCNC: 22.5 PG — LOW (ref 27–34)
MCHC RBC-ENTMCNC: 32.1 GM/DL — SIGNIFICANT CHANGE UP (ref 32–36)
MCV RBC AUTO: 70.3 FL — LOW (ref 80–100)
NRBC # BLD: 0 /100 WBCS — SIGNIFICANT CHANGE UP (ref 0–0)
PLATELET # BLD AUTO: 152 K/UL — SIGNIFICANT CHANGE UP (ref 150–400)
POTASSIUM SERPL-MCNC: 3.4 MMOL/L — LOW (ref 3.5–5.3)
POTASSIUM SERPL-SCNC: 3.4 MMOL/L — LOW (ref 3.5–5.3)
RBC # BLD: 6.3 M/UL — HIGH (ref 4.2–5.8)
RBC # FLD: 16 % — HIGH (ref 10.3–14.5)
SODIUM SERPL-SCNC: 136 MMOL/L — SIGNIFICANT CHANGE UP (ref 135–145)
WBC # BLD: 8.5 K/UL — SIGNIFICANT CHANGE UP (ref 3.8–10.5)
WBC # FLD AUTO: 8.5 K/UL — SIGNIFICANT CHANGE UP (ref 3.8–10.5)

## 2019-08-19 PROCEDURE — 82553 CREATINE MB FRACTION: CPT

## 2019-08-19 PROCEDURE — 83735 ASSAY OF MAGNESIUM: CPT

## 2019-08-19 PROCEDURE — 85610 PROTHROMBIN TIME: CPT

## 2019-08-19 PROCEDURE — 82306 VITAMIN D 25 HYDROXY: CPT

## 2019-08-19 PROCEDURE — 82550 ASSAY OF CK (CPK): CPT

## 2019-08-19 PROCEDURE — 80048 BASIC METABOLIC PNL TOTAL CA: CPT

## 2019-08-19 PROCEDURE — 83036 HEMOGLOBIN GLYCOSYLATED A1C: CPT

## 2019-08-19 PROCEDURE — 93306 TTE W/DOPPLER COMPLETE: CPT

## 2019-08-19 PROCEDURE — 93017 CV STRESS TEST TRACING ONLY: CPT

## 2019-08-19 PROCEDURE — 86803 HEPATITIS C AB TEST: CPT

## 2019-08-19 PROCEDURE — 84443 ASSAY THYROID STIM HORMONE: CPT

## 2019-08-19 PROCEDURE — 85027 COMPLETE CBC AUTOMATED: CPT

## 2019-08-19 PROCEDURE — A9502: CPT

## 2019-08-19 PROCEDURE — 84100 ASSAY OF PHOSPHORUS: CPT

## 2019-08-19 PROCEDURE — 82746 ASSAY OF FOLIC ACID SERUM: CPT

## 2019-08-19 PROCEDURE — 85730 THROMBOPLASTIN TIME PARTIAL: CPT

## 2019-08-19 PROCEDURE — 80053 COMPREHEN METABOLIC PANEL: CPT

## 2019-08-19 PROCEDURE — 36415 COLL VENOUS BLD VENIPUNCTURE: CPT

## 2019-08-19 PROCEDURE — 82607 VITAMIN B-12: CPT

## 2019-08-19 PROCEDURE — 99285 EMERGENCY DEPT VISIT HI MDM: CPT | Mod: 25

## 2019-08-19 PROCEDURE — 80061 LIPID PANEL: CPT

## 2019-08-19 PROCEDURE — 78452 HT MUSCLE IMAGE SPECT MULT: CPT

## 2019-08-19 PROCEDURE — 93005 ELECTROCARDIOGRAM TRACING: CPT

## 2019-08-19 PROCEDURE — 84484 ASSAY OF TROPONIN QUANT: CPT

## 2019-08-19 RX ORDER — METOPROLOL TARTRATE 50 MG
1 TABLET ORAL
Qty: 30 | Refills: 0
Start: 2019-08-19 | End: 2019-09-17

## 2019-08-19 RX ORDER — ERGOCALCIFEROL 1.25 MG/1
50000 CAPSULE ORAL
Refills: 0 | Status: DISCONTINUED | OUTPATIENT
Start: 2019-08-19 | End: 2019-08-19

## 2019-08-19 RX ORDER — ASPIRIN/CALCIUM CARB/MAGNESIUM 324 MG
1 TABLET ORAL
Qty: 30 | Refills: 0
Start: 2019-08-19 | End: 2019-09-17

## 2019-08-19 RX ORDER — PANTOPRAZOLE SODIUM 20 MG/1
1 TABLET, DELAYED RELEASE ORAL
Qty: 30 | Refills: 0
Start: 2019-08-19 | End: 2019-09-17

## 2019-08-19 RX ORDER — ERGOCALCIFEROL 1.25 MG/1
1 CAPSULE ORAL
Qty: 6 | Refills: 0
Start: 2019-08-19 | End: 2019-09-29

## 2019-08-19 RX ORDER — POTASSIUM CHLORIDE 20 MEQ
40 PACKET (EA) ORAL EVERY 4 HOURS
Refills: 0 | Status: COMPLETED | OUTPATIENT
Start: 2019-08-19 | End: 2019-08-19

## 2019-08-19 RX ADMIN — Medication 81 MILLIGRAM(S): at 12:24

## 2019-08-19 RX ADMIN — Medication 12.5 MILLIGRAM(S): at 05:20

## 2019-08-19 RX ADMIN — Medication 40 MILLIEQUIVALENT(S): at 17:11

## 2019-08-19 RX ADMIN — ENOXAPARIN SODIUM 40 MILLIGRAM(S): 100 INJECTION SUBCUTANEOUS at 12:24

## 2019-08-19 RX ADMIN — SERTRALINE 50 MILLIGRAM(S): 25 TABLET, FILM COATED ORAL at 12:24

## 2019-08-19 RX ADMIN — FINASTERIDE 5 MILLIGRAM(S): 5 TABLET, FILM COATED ORAL at 12:24

## 2019-08-19 RX ADMIN — Medication 25 MILLIGRAM(S): at 05:21

## 2019-08-19 RX ADMIN — Medication 40 MILLIEQUIVALENT(S): at 13:22

## 2019-08-19 RX ADMIN — ERGOCALCIFEROL 50000 UNIT(S): 1.25 CAPSULE ORAL at 12:24

## 2019-08-19 RX ADMIN — Medication 12.5 MILLIGRAM(S): at 17:11

## 2019-08-19 RX ADMIN — Medication 20 MILLIGRAM(S): at 05:19

## 2019-08-19 NOTE — PROGRESS NOTE ADULT - PROBLEM SELECTOR PLAN 4
Patient has a history of Normal Pressure Hydrocephalus.   Has imbalanced gait, no urinary incontinence, no altered mental status(as per family)  Originally scheduled for shunt today 8/19/2019  Cardiac workup negative.

## 2019-08-19 NOTE — DISCHARGE NOTE PROVIDER - HOSPITAL COURSE
72 y/o M with PMHx of BPH, HTN, Normal Pressure Hydrocephalus, Lumbar Stenosis s/p surgery and PSHx of Cataract Surgery,  c/o palpitations when he was having breakfast. Pt related previous episode of Afib 7 years ago that was Tx. However, pt is not currently on any anticoagulants or antiarrhythmics. He is weak in his legs and sharp decline in function since 3-4 months. He cannot walk more than a block. He lost muscle strength and also not ambulating well due to balance problems. He feels whole body is going to fall. Pt is scheduled for a  Shunt Placement on 8/19. Pt denies CP, Urinary incontinence, memory problems, SOB, fever, HA, dizziness or any other acute complaints.    In the ED, patient's ECG showed afib with RVR. Patient given low dose beta blocker which seemed to control patient's HR.    While admitted, cardiology was consulted and recommended to continue low dose beta blocker, ECHO. Patient's family did not want to start anticoagulation as patient is at an increased risk for falls. ECHO showed a normal EF >55% and Grade 1 diastolic dysfunction. Stress test obtained was negative for ischemia.    Physical therapy evaluated the patient and determined he needed home PT.     Patient is stable for discharge per attending and is advised to follow up with PCP as outpatient    Please refer to patient's complete medical chart with documents for a full hospital course, for this is only a brief summary.

## 2019-08-19 NOTE — PROGRESS NOTE ADULT - SUBJECTIVE AND OBJECTIVE BOX
Patient denies CP, SOB Review of systems otherwise (-)    aspirin  chewable 81 milliGRAM(s) Oral daily  enalapril 20 milliGRAM(s) Oral daily  enoxaparin Injectable 40 milliGRAM(s) SubCutaneous daily  ergocalciferol 12285 Unit(s) Oral <User Schedule>  finasteride 5 milliGRAM(s) Oral daily  hydrochlorothiazide 25 milliGRAM(s) Oral daily  metoprolol tartrate 12.5 milliGRAM(s) Oral two times a day  metoprolol tartrate Injectable 5 milliGRAM(s) IV Push every 6 hours PRN  potassium chloride    Tablet ER 40 milliEquivalent(s) Oral every 4 hours  sertraline 50 milliGRAM(s) Oral daily  tamsulosin 0.4 milliGRAM(s) Oral at bedtime                            14.2   8.50  )-----------( 152      ( 19 Aug 2019 08:31 )             44.3       Hemoglobin: 14.2 g/dL (08-19 @ 08:31)  Hemoglobin: 14.1 g/dL (08-18 @ 14:26)  Hemoglobin: 7.4 g/dL (08-18 @ 06:07)  Hemoglobin: 13.6 g/dL (08-17 @ 07:00)  Hemoglobin: 14.0 g/dL (08-16 @ 13:13)      08-19    136  |  103  |  18  ----------------------------<  91  3.4<L>   |  29  |  0.92    Ca    8.9      19 Aug 2019 08:31      Creatinine Trend: 0.92<--, 0.94<--, 0.93<--, 0.93<--, 1.00<--    COAGS:     CARDIAC MARKERS ( 17 Aug 2019 07:00 )  <0.015 ng/mL / x     / 70 U/L / x     / 1.4 ng/mL        T(C): 36.7 (08-19-19 @ 12:15), Max: 36.9 (08-19-19 @ 05:00)  HR: 68 (08-19-19 @ 14:03) (56 - 69)  BP: 138/94 (08-19-19 @ 14:03) (117/69 - 167/88)  RR: 18 (08-19-19 @ 12:15) (16 - 18)  SpO2: 95% (08-19-19 @ 12:15) (94% - 96%)  Wt(kg): --    I&O's Summary      Gen: Appears well in NAD  HEENT:  (-)icterus (-)pallor  CV: N S1 S2 1/6 KYLAH (+)2 Pulses B/l  Resp:  Clear to ausculatation B/L, normal effort  GI: (+) BS Soft, NT, ND  Lymph:  (-)Edema, (-)obvious lymphadenopathy  Skin: Warm to touch, Normal turgor  Psych: Appropriate mood and affect      TELEMETRY: 	 NSR 70         ASSESSMENT/PLAN: 	73 yr male hx of hydrocephalus, lumbar stenosis , cataract surgery, PAF, no A/C now with Palpitation , Afib on ECG.    - normal LV function on echo  - No ischemia on stress  - ChADS-VASC score of 2 he should be anticoagulated as his yearly risk of CVA is 2.2% annually  - If his fall risk is higher then cont  mg PO daily  - Consider AC after his NPH is treated    John Serrato MD, [facility]  BEEPER [phone]

## 2019-08-19 NOTE — DISCHARGE NOTE NURSING/CASE MANAGEMENT/SOCIAL WORK - NSDCDPATPORTLINK_GEN_ALL_CORE
You can access the [facility] Portal, offered by [facility], by registering with the following website: [email or web address]

## 2019-08-19 NOTE — PROGRESS NOTE ADULT - PROBLEM SELECTOR PLAN 1
-Pt had palpitations, Tachycardia.  EKG showed showed Afib with RVR   [id number] VASC Score of:_____2________ Stroker Risk of  2   %  ATRIA stroke risk 4, low risk for ischemic stroke  - Not starting on Anticoagulation as the pt is at risk of falls due to NPH and gait imbalance.  Lopressor 5mg iv prn  started on metoprolol 12.5 mg q 12  T1, T2 negative  ECHO performed: Normal EF, grade 1 diastolic dysfunction  Stress Test- Negative for ischemia   Cardiology Consulted Dr. Serrato -Pt had palpitations, Tachycardia.  EKG showed showed Afib with RVR   [id number] VASC Score of:_____2________ Stroker Risk of  2%  ATRIA stroke risk 4, low risk for ischemic stroke  - Not starting on Anticoagulation as the pt is at risk of falls due to NPH and gait imbalance.  Lopressor 5mg iv prn  Rate controlled on metoprolol 12.5 mg q 12    T1, T2 negative  ECHO performed: Normal EF, grade 1 diastolic dysfunction  Stress Test- Negative for ischemia   Cardiology Consulted Dr. Serrato

## 2019-08-19 NOTE — PROGRESS NOTE ADULT - SUBJECTIVE AND OBJECTIVE BOX
PGY 1 Note discussed with supervising resident and primary attending    Patient is a 73y old  Male who presents with a chief complaint of palpitations (18 Aug 2019 09:47)      INTERVAL HPI/OVERNIGHT EVENTS: offers no new complaints; current symptoms resolving    MEDICATIONS  (STANDING):  aspirin  chewable 81 milliGRAM(s) Oral daily  enalapril 20 milliGRAM(s) Oral daily  enoxaparin Injectable 40 milliGRAM(s) SubCutaneous daily  ergocalciferol 65504 Unit(s) Oral <User Schedule>  finasteride 5 milliGRAM(s) Oral daily  hydrochlorothiazide 25 milliGRAM(s) Oral daily  metoprolol tartrate 12.5 milliGRAM(s) Oral two times a day  potassium chloride    Tablet ER 40 milliEquivalent(s) Oral every 4 hours  sertraline 50 milliGRAM(s) Oral daily  tamsulosin 0.4 milliGRAM(s) Oral at bedtime    MEDICATIONS  (PRN):  metoprolol tartrate Injectable 5 milliGRAM(s) IV Push every 6 hours PRN palpitations      __________________________________________________  REVIEW OF SYSTEMS:    CONSTITUTIONAL: No fever,   EYES: no acute visual disturbances  NECK: No pain or stiffness  RESPIRATORY: No cough; No shortness of breath  CARDIOVASCULAR: No chest pain, no palpitations  GASTROINTESTINAL: No pain. No nausea or vomiting; No diarrhea   NEUROLOGICAL: No headache or numbness, no tremors  MUSCULOSKELETAL: No joint pain, no muscle pain  GENITOURINARY: no dysuria, no frequency, no hesitancy  PSYCHIATRY: no depression , no anxiety  ALL OTHER  ROS negative        Vital Signs Last 24 Hrs  T(C): 36.7 (19 Aug 2019 12:15), Max: 36.9 (19 Aug 2019 05:00)  T(F): 98.1 (19 Aug 2019 12:15), Max: 98.5 (19 Aug 2019 05:00)  HR: 68 (19 Aug 2019 14:03) (56 - 69)  BP: 138/94 (19 Aug 2019 14:03) (117/69 - 167/88)  BP(mean): --  RR: 18 (19 Aug 2019 12:15) (16 - 18)  SpO2: 95% (19 Aug 2019 12:15) (94% - 96%)    ________________________________________________  PHYSICAL EXAM:  GENERAL: NAD  HEENT: Normocephalic;  conjunctivae and sclerae clear; moist mucous membranes;   NECK : supple  CHEST/LUNG: Clear to auscultation bilaterally with good air entry   HEART: S1 S2  regular; no murmurs, gallops or rubs  ABDOMEN: Soft, Nontender, Nondistended; Bowel sounds present  EXTREMITIES: no cyanosis; no edema; no calf tenderness  SKIN: warm and dry; no rash  NERVOUS SYSTEM:  Awake and alert; Oriented  to place, person and time ; no new deficits    _________________________________________________  LABS:                        14.2   8.50  )-----------( 152      ( 19 Aug 2019 08:31 )             44.3     08-19    136  |  103  |  18  ----------------------------<  91  3.4<L>   |  29  |  0.92    Ca    8.9      19 Aug 2019 08:31          CAPILLARY BLOOD GLUCOSE            RADIOLOGY & ADDITIONAL TESTS:    Imaging Personally Reviewed:  YES/NO    Consultant(s) Notes Reviewed:   YES/ No    Care Discussed with Consultants :     Plan of care was discussed with patient and /or primary care giver; all questions and concerns were addressed and care was aligned with patient's wishes.

## 2019-08-19 NOTE — PHYSICAL THERAPY INITIAL EVALUATION ADULT - PERTINENT HX OF CURRENT PROBLEM, REHAB EVAL
Patient was brought to ED from home with c/o palpitations. Patient has h/o normal pressure hydrocephalus

## 2019-08-19 NOTE — DISCHARGE NOTE PROVIDER - NSDCCPCAREPLAN_GEN_ALL_CORE_FT
PRINCIPAL DISCHARGE DIAGNOSIS  Diagnosis: Paroxysmal atrial fibrillation  Assessment and Plan of Treatment: You presented with complaints of heart palpitations. ECG obtained showed afib with RVR. Your rate was well controlled with medication call metoprolol. The decision was made to not start anticoagulation due to your current history of abnormal gait and increased risk of falls. Please continue metoprolol as recommended in the medication reconcillation form upon discharge as this medication controls your heart rate. Please follow up with your primary care physician in one week to inform them of your recent hospitalization.      SECONDARY DISCHARGE DIAGNOSES  Diagnosis: Vitamin D deficiency  Assessment and Plan of Treatment: You was noted to have low Vitamin D levels less than 20 which is severe. You was started on once a week 50,000 units Vitamin D. Please continue to take for about 8 weeks after which your PCP should repeat levels. Once levels more than 30, you doctor may switch to 1000 units daily as a maintenance dose    Diagnosis: HTN (hypertension)  Assessment and Plan of Treatment: Continue with blood pressure medication. Maintain a healthy diet that consist of low sugar, low fat, low sodium diet. Exercise frequently if possible.  Follow up with primary care physician in one week after discharge.    Diagnosis: BPH (benign prostatic hyperplasia)  Assessment and Plan of Treatment: Continue with Flomax and Proscar as indicated in medication reconciliation. Follow up with your primary car physician within one week of dicharge to inform of your recent hospitalization.    Diagnosis: Normal pressure hydrocephalus  Assessment and Plan of Treatment: You have a recent history of NPH. You were scheduled for a shunt procedure however, due to your heart palpatations, you were admitted for a cardiac work up. Your ECHO and your stress returned negative. Please reschedule your appointment for your procedure. Please follow up with your primary care physician in one week to inform them of your recent hospitalization.

## 2019-09-18 PROBLEM — G91.2 (IDIOPATHIC) NORMAL PRESSURE HYDROCEPHALUS: Chronic | Status: ACTIVE | Noted: 2019-08-16

## 2019-10-09 ENCOUNTER — APPOINTMENT (OUTPATIENT)
Dept: CT IMAGING | Facility: IMAGING CENTER | Age: 74
End: 2019-10-09
Payer: MEDICARE

## 2019-10-09 ENCOUNTER — OUTPATIENT (OUTPATIENT)
Dept: OUTPATIENT SERVICES | Facility: HOSPITAL | Age: 74
LOS: 1 days | End: 2019-10-09
Payer: MEDICARE

## 2019-10-09 DIAGNOSIS — Z00.8 ENCOUNTER FOR OTHER GENERAL EXAMINATION: ICD-10-CM

## 2019-10-09 DIAGNOSIS — Z98.49 CATARACT EXTRACTION STATUS, UNSPECIFIED EYE: Chronic | ICD-10-CM

## 2019-10-09 DIAGNOSIS — M48.00 SPINAL STENOSIS, SITE UNSPECIFIED: Chronic | ICD-10-CM

## 2019-10-09 PROCEDURE — 70450 CT HEAD/BRAIN W/O DYE: CPT

## 2019-10-09 PROCEDURE — 70450 CT HEAD/BRAIN W/O DYE: CPT | Mod: 26

## 2019-10-14 ENCOUNTER — OUTPATIENT (OUTPATIENT)
Dept: OUTPATIENT SERVICES | Facility: HOSPITAL | Age: 74
LOS: 1 days | End: 2019-10-14
Payer: MEDICARE

## 2019-10-14 VITALS
TEMPERATURE: 97 F | DIASTOLIC BLOOD PRESSURE: 80 MMHG | HEIGHT: 66.75 IN | HEART RATE: 67 BPM | OXYGEN SATURATION: 97 % | WEIGHT: 186.95 LBS | SYSTOLIC BLOOD PRESSURE: 130 MMHG | RESPIRATION RATE: 14 BRPM

## 2019-10-14 DIAGNOSIS — G91.2 (IDIOPATHIC) NORMAL PRESSURE HYDROCEPHALUS: ICD-10-CM

## 2019-10-14 DIAGNOSIS — M48.00 SPINAL STENOSIS, SITE UNSPECIFIED: Chronic | ICD-10-CM

## 2019-10-14 DIAGNOSIS — Z98.49 CATARACT EXTRACTION STATUS, UNSPECIFIED EYE: Chronic | ICD-10-CM

## 2019-10-14 LAB
ANION GAP SERPL CALC-SCNC: 15 MMO/L — HIGH (ref 7–14)
BLD GP AB SCN SERPL QL: NEGATIVE — SIGNIFICANT CHANGE UP
BUN SERPL-MCNC: 17 MG/DL — SIGNIFICANT CHANGE UP (ref 7–23)
CALCIUM SERPL-MCNC: 9.4 MG/DL — SIGNIFICANT CHANGE UP (ref 8.4–10.5)
CHLORIDE SERPL-SCNC: 99 MMOL/L — SIGNIFICANT CHANGE UP (ref 98–107)
CO2 SERPL-SCNC: 25 MMOL/L — SIGNIFICANT CHANGE UP (ref 22–31)
CREAT SERPL-MCNC: 0.9 MG/DL — SIGNIFICANT CHANGE UP (ref 0.5–1.3)
GLUCOSE SERPL-MCNC: 83 MG/DL — SIGNIFICANT CHANGE UP (ref 70–99)
HCT VFR BLD CALC: 44 % — SIGNIFICANT CHANGE UP (ref 39–50)
HGB BLD-MCNC: 13.7 G/DL — SIGNIFICANT CHANGE UP (ref 13–17)
MCHC RBC-ENTMCNC: 22.4 PG — LOW (ref 27–34)
MCHC RBC-ENTMCNC: 31.1 % — LOW (ref 32–36)
MCV RBC AUTO: 72 FL — LOW (ref 80–100)
NRBC # FLD: 0 K/UL — SIGNIFICANT CHANGE UP (ref 0–0)
PLATELET # BLD AUTO: 150 K/UL — SIGNIFICANT CHANGE UP (ref 150–400)
PMV BLD: 11.1 FL — SIGNIFICANT CHANGE UP (ref 7–13)
POTASSIUM SERPL-MCNC: 3.5 MMOL/L — SIGNIFICANT CHANGE UP (ref 3.5–5.3)
POTASSIUM SERPL-SCNC: 3.5 MMOL/L — SIGNIFICANT CHANGE UP (ref 3.5–5.3)
RBC # BLD: 6.11 M/UL — HIGH (ref 4.2–5.8)
RBC # FLD: 15.3 % — HIGH (ref 10.3–14.5)
RH IG SCN BLD-IMP: POSITIVE — SIGNIFICANT CHANGE UP
SODIUM SERPL-SCNC: 139 MMOL/L — SIGNIFICANT CHANGE UP (ref 135–145)
WBC # BLD: 9.67 K/UL — SIGNIFICANT CHANGE UP (ref 3.8–10.5)
WBC # FLD AUTO: 9.67 K/UL — SIGNIFICANT CHANGE UP (ref 3.8–10.5)

## 2019-10-14 PROCEDURE — 93010 ELECTROCARDIOGRAM REPORT: CPT

## 2019-10-14 RX ORDER — FINASTERIDE 5 MG/1
1 TABLET, FILM COATED ORAL
Qty: 0 | Refills: 0 | DISCHARGE

## 2019-10-14 RX ORDER — TAMSULOSIN HYDROCHLORIDE 0.4 MG/1
1 CAPSULE ORAL
Qty: 0 | Refills: 0 | DISCHARGE

## 2019-10-14 NOTE — H&P PST ADULT - NSICDXPROBLEM_GEN_ALL_CORE_FT
Problem:   (Idiopathic) normal pressure hydrocephalus and to be evaluated   Assessment and Plan: Patient scheduled for an insertion of ventriculo peritoneal shunt on 10/21/19  Patient provided with verbal and written presurgical instructions; verbalized understanding  with teach back.    Patient provided with famotidine for GI prophylaxis; verbalized understanding.    Patient provided with Chlorhexidine wash, verbal and written instructions reviewed. Patient demonstrated understanding with teach back.     STOP BANG score met, OR booking notified  OR booking notified of screw implant     Comparison EKG, Echo and Stress test in chart    Medical evaluation requested by surgeon and PST for recent paroxysmal atrial fibrillation, copy requested     Problem: Hypertension  Assessment and Plan: Patient instructed to take metoprolol, enalapril, sertraline, finasteride, tamsulosin on day of procedure, verbalized understanding.  Patient instructed to hold HCTZ on DOS, verbalized understanding Problem:   (Idiopathic) normal pressure hydrocephalus and to be evaluated   Assessment and Plan: Patient scheduled for an insertion of ventriculo peritoneal shunt on 10/21/19  Patient provided with verbal and written presurgical instructions; verbalized understanding  with teach back.    Patient provided with famotidine for GI prophylaxis; verbalized understanding.    Patient provided with Chlorhexidine wash, verbal and written instructions reviewed. Patient demonstrated understanding with teach back.     STOP BANG score met, OR booking notified  OR booking notified of screw implant     Comparison EKG, Echo and Stress test in chart    Medical evaluation requested by surgeon and PST for recent paroxysmal atrial fibrillation and METS < 4, copy requested     Problem: Hypertension  Assessment and Plan: Patient instructed to take metoprolol, enalapril, sertraline, finasteride, tamsulosin on day of procedure, verbalized understanding.  Patient instructed to hold HCTZ on DOS, verbalized understanding Problem:   (Idiopathic) normal pressure hydrocephalus and to be evaluated   Assessment and Plan: Patient scheduled for an insertion of ventriculo peritoneal shunt on 10/21/19  Patient provided with verbal and written presurgical instructions; verbalized understanding  with teach back.    Patient provided with famotidine for GI prophylaxis; verbalized understanding.    Patient provided with Chlorhexidine wash, verbal and written instructions reviewed. Patient demonstrated understanding with teach back.     STOP BANG score met, OR booking notified  OR booking notified of screw implant     Recent EKG, Echo and Stress test in chart    Medical evaluation requested by surgeon and PST for recent paroxysmal atrial fibrillation and METS < 4, copy requested     Problem: Hypertension  Assessment and Plan: Patient instructed to take metoprolol, enalapril, sertraline, finasteride, tamsulosin on day of procedure, verbalized understanding.  Patient instructed to hold HCTZ on DOS, verbalized understanding Problem:   (Idiopathic) normal pressure hydrocephalus and to be evaluated   Assessment and Plan: Patient scheduled for an insertion of ventriculo peritoneal shunt on 10/21/19  Patient provided with verbal and written presurgical instructions; verbalized understanding  with teach back.    Patient provided with famotidine for GI prophylaxis; verbalized understanding.    Patient provided with Chlorhexidine wash, verbal and written instructions reviewed. Patient demonstrated understanding with teach back.     STOP BANG score met, OR booking notified  OR booking notified of screw implant     Recent EKG, Echo and Stress test in chart    Medical evaluation requested by surgeon and PST for recent paroxysmal atrial fibrillation and METS < 4, copy requested   Case discussed with Dr Smith    Problem: Hypertension  Assessment and Plan: Patient instructed to take metoprolol, enalapril, sertraline, finasteride, tamsulosin on day of procedure, verbalized understanding.  Patient instructed to hold HCTZ on DOS, verbalized understanding

## 2019-10-14 NOTE — H&P PST ADULT - NEGATIVE NEUROLOGICAL SYMPTOMS
no focal seizures/no hemiparesis/no transient paralysis/no generalized seizures/no headache/no vertigo/no facial palsy/no loss of sensation/no loss of consciousness/no tremors/no syncope

## 2019-10-14 NOTE — H&P PST ADULT - HISTORY OF PRESENT ILLNESS
73 male With hx of HTN, BPH. Presents to PST w/ a preop dx of (Idiopathic) normal pressure hydrocephalus and to be evaluated for an insertion of ventriculo peritoneal shunt on 10/21/19. Patient is poor historian, information provided by patient  An MRI done back on 2017 and mild edema to brain noted but monitored, then pt started complaining of dizziness, loss balance, unsteady gait, memory loss 7 mos ago, states he is unable to walk more than a block due to muscular weakness in legs, reports falling 4-5 times in the past 6 months. Went to see his pcp who referred him back to Dr Trevino and MRI repeated, increase in fluid noted and a spinal tap done, fluid drained and felt better. Pt then recommended  shunt for which is now scheduled. Patient was scheduled for August 19,2019, reports he developed palpitations and went to [facility] for eval, noted to have paroxysmal atrial fibrillation, patient has stress and echo test, placed on beta blocker and aspirin.

## 2019-10-14 NOTE — H&P PST ADULT - NEGATIVE CARDIOVASCULAR SYMPTOMS
no claudication/no paroxysmal nocturnal dyspnea/no orthopnea/no palpitations/no peripheral edema/no chest pain/no dyspnea on exertion

## 2019-10-14 NOTE — H&P PST ADULT - RS GEN PE MLT RESP DETAILS PC
no wheezes/clear to auscultation bilaterally/no subcutaneous emphysema/no chest wall tenderness/no rhonchi/airway patent/no rales/breath sounds equal/good air movement/respirations non-labored/normal/no intercostal retractions

## 2019-10-14 NOTE — H&P PST ADULT - MUSCULOSKELETAL
details… detailed exam ROM intact/diminished strength/no joint swelling/no joint erythema/no joint warmth

## 2019-10-14 NOTE — H&P PST ADULT - NEGATIVE MUSCULOSKELETAL SYMPTOMS
no muscle cramps/no muscle weakness/no back pain/no arthritis/no joint swelling/no stiffness/no arm pain L/no arm pain R/no leg pain R/no arthralgia/no myalgia/no neck pain/no leg pain L

## 2019-10-14 NOTE — H&P PST ADULT - NEGATIVE GENERAL SYMPTOMS
no polydipsia/no chills/no anorexia/no weight loss/no sweating/no weight gain/no polyphagia/no polyuria/no malaise/no fever

## 2019-10-14 NOTE — H&P PST ADULT - NSICDXPASTSURGICALHX_GEN_ALL_CORE_FT
PAST SURGICAL HISTORY:  S/P cataract surgery     SS (spinal stenosis) lumbar vetebral fusion L3- L4, L4-L5, L5-S1

## 2019-10-18 NOTE — ASU PATIENT PROFILE, ADULT - PMH
(Idiopathic) normal pressure hydrocephalus    BPH (benign prostatic hyperplasia)    Depression    H/O hydrocephalus    HTN (hypertension)    Lumbar stenosis  s/p sx 10/2017  Paroxysmal atrial fibrillation

## 2019-10-20 ENCOUNTER — TRANSCRIPTION ENCOUNTER (OUTPATIENT)
Age: 74
End: 2019-10-20

## 2019-10-21 ENCOUNTER — INPATIENT (INPATIENT)
Facility: HOSPITAL | Age: 74
LOS: 1 days | Discharge: HOME | End: 2019-10-23
Attending: NEUROLOGICAL SURGERY | Admitting: NEUROLOGICAL SURGERY
Payer: MEDICARE

## 2019-10-21 VITALS
RESPIRATION RATE: 18 BRPM | SYSTOLIC BLOOD PRESSURE: 170 MMHG | DIASTOLIC BLOOD PRESSURE: 103 MMHG | HEART RATE: 57 BPM | WEIGHT: 186.95 LBS | TEMPERATURE: 98 F | OXYGEN SATURATION: 97 % | HEIGHT: 66.75 IN

## 2019-10-21 DIAGNOSIS — Z98.49 CATARACT EXTRACTION STATUS, UNSPECIFIED EYE: Chronic | ICD-10-CM

## 2019-10-21 DIAGNOSIS — M48.00 SPINAL STENOSIS, SITE UNSPECIFIED: Chronic | ICD-10-CM

## 2019-10-21 DIAGNOSIS — G91.2 (IDIOPATHIC) NORMAL PRESSURE HYDROCEPHALUS: ICD-10-CM

## 2019-10-21 LAB
ANION GAP SERPL CALC-SCNC: 12 MMO/L — SIGNIFICANT CHANGE UP (ref 7–14)
APTT BLD: 32.7 SEC — SIGNIFICANT CHANGE UP (ref 27.5–36.3)
BUN SERPL-MCNC: 10 MG/DL — SIGNIFICANT CHANGE UP (ref 7–23)
CALCIUM SERPL-MCNC: 8.4 MG/DL — SIGNIFICANT CHANGE UP (ref 8.4–10.5)
CHLORIDE SERPL-SCNC: 101 MMOL/L — SIGNIFICANT CHANGE UP (ref 98–107)
CLARITY CSF: CLEAR — SIGNIFICANT CHANGE UP
CO2 SERPL-SCNC: 23 MMOL/L — SIGNIFICANT CHANGE UP (ref 22–31)
COLOR CSF: COLORLESS — SIGNIFICANT CHANGE UP
CREAT SERPL-MCNC: 0.87 MG/DL — SIGNIFICANT CHANGE UP (ref 0.5–1.3)
GLUCOSE CSF-MCNC: 61 MG/DL — SIGNIFICANT CHANGE UP (ref 40–70)
GLUCOSE SERPL-MCNC: 121 MG/DL — HIGH (ref 70–99)
GRAM STN CSF: SIGNIFICANT CHANGE UP
HCT VFR BLD CALC: 39.6 % — SIGNIFICANT CHANGE UP (ref 39–50)
HGB BLD-MCNC: 12.3 G/DL — LOW (ref 13–17)
INR BLD: 1.1 — SIGNIFICANT CHANGE UP (ref 0.88–1.17)
MAGNESIUM SERPL-MCNC: 1.7 MG/DL — SIGNIFICANT CHANGE UP (ref 1.6–2.6)
MCHC RBC-ENTMCNC: 22.4 PG — LOW (ref 27–34)
MCHC RBC-ENTMCNC: 31.1 % — LOW (ref 32–36)
MCV RBC AUTO: 72.1 FL — LOW (ref 80–100)
NRBC # FLD: 0.02 K/UL — SIGNIFICANT CHANGE UP (ref 0–0)
NRBC NFR CSF: 2 CELL/UL — SIGNIFICANT CHANGE UP (ref 0–5)
OTHER - SPINAL FLUID: 100 % — SIGNIFICANT CHANGE UP
PHOSPHATE SERPL-MCNC: 3 MG/DL — SIGNIFICANT CHANGE UP (ref 2.5–4.5)
PLATELET # BLD AUTO: 122 K/UL — LOW (ref 150–400)
PMV BLD: 11.7 FL — SIGNIFICANT CHANGE UP (ref 7–13)
POTASSIUM SERPL-MCNC: 4 MMOL/L — SIGNIFICANT CHANGE UP (ref 3.5–5.3)
POTASSIUM SERPL-SCNC: 4 MMOL/L — SIGNIFICANT CHANGE UP (ref 3.5–5.3)
PROT CSF-MCNC: 4.7 MG/DL — LOW (ref 15–40)
PROTHROM AB SERPL-ACNC: 12.2 SEC — SIGNIFICANT CHANGE UP (ref 9.8–13.1)
RBC # BLD: 5.49 M/UL — SIGNIFICANT CHANGE UP (ref 4.2–5.8)
RBC # CSF: 83 CELL/UL — HIGH (ref 0–0)
RBC # FLD: 15.3 % — HIGH (ref 10.3–14.5)
SODIUM SERPL-SCNC: 136 MMOL/L — SIGNIFICANT CHANGE UP (ref 135–145)
SPECIMEN SOURCE: SIGNIFICANT CHANGE UP
TOTAL CELLS COUNTED, SPINAL FLUID: 2 CELLS — SIGNIFICANT CHANGE UP
WBC # BLD: 12.26 K/UL — HIGH (ref 3.8–10.5)
WBC # FLD AUTO: 12.26 K/UL — HIGH (ref 3.8–10.5)
XANTHOCHROMIA: SIGNIFICANT CHANGE UP

## 2019-10-21 PROCEDURE — 99223 1ST HOSP IP/OBS HIGH 75: CPT | Mod: 25

## 2019-10-21 PROCEDURE — 70450 CT HEAD/BRAIN W/O DYE: CPT | Mod: 26

## 2019-10-21 PROCEDURE — 88108 CYTOPATH CONCENTRATE TECH: CPT | Mod: 26

## 2019-10-21 RX ORDER — DEXTROSE MONOHYDRATE, SODIUM CHLORIDE, AND POTASSIUM CHLORIDE 50; .745; 4.5 G/1000ML; G/1000ML; G/1000ML
1000 INJECTION, SOLUTION INTRAVENOUS
Refills: 0 | Status: DISCONTINUED | OUTPATIENT
Start: 2019-10-21 | End: 2019-10-21

## 2019-10-21 RX ORDER — MAGNESIUM SULFATE 500 MG/ML
2 VIAL (ML) INJECTION ONCE
Refills: 0 | Status: DISCONTINUED | OUTPATIENT
Start: 2019-10-21 | End: 2019-10-21

## 2019-10-21 RX ORDER — TAMSULOSIN HYDROCHLORIDE 0.4 MG/1
0.4 CAPSULE ORAL AT BEDTIME
Refills: 0 | Status: DISCONTINUED | OUTPATIENT
Start: 2019-10-21 | End: 2019-10-23

## 2019-10-21 RX ORDER — ONDANSETRON 8 MG/1
4 TABLET, FILM COATED ORAL ONCE
Refills: 0 | Status: DISCONTINUED | OUTPATIENT
Start: 2019-10-21 | End: 2019-10-22

## 2019-10-21 RX ORDER — SERTRALINE 25 MG/1
50 TABLET, FILM COATED ORAL DAILY
Refills: 0 | Status: DISCONTINUED | OUTPATIENT
Start: 2019-10-21 | End: 2019-10-23

## 2019-10-21 RX ORDER — HYDROCHLOROTHIAZIDE 25 MG
25 TABLET ORAL DAILY
Refills: 0 | Status: DISCONTINUED | OUTPATIENT
Start: 2019-10-21 | End: 2019-10-23

## 2019-10-21 RX ORDER — FENTANYL CITRATE 50 UG/ML
50 INJECTION INTRAVENOUS
Refills: 0 | Status: DISCONTINUED | OUTPATIENT
Start: 2019-10-21 | End: 2019-10-22

## 2019-10-21 RX ORDER — OXYCODONE HYDROCHLORIDE 5 MG/1
5 TABLET ORAL EVERY 4 HOURS
Refills: 0 | Status: DISCONTINUED | OUTPATIENT
Start: 2019-10-21 | End: 2019-10-23

## 2019-10-21 RX ORDER — ACETAMINOPHEN 500 MG
650 TABLET ORAL EVERY 6 HOURS
Refills: 0 | Status: DISCONTINUED | OUTPATIENT
Start: 2019-10-21 | End: 2019-10-23

## 2019-10-21 RX ORDER — CEFAZOLIN SODIUM 1 G
2000 VIAL (EA) INJECTION EVERY 8 HOURS
Refills: 0 | Status: COMPLETED | OUTPATIENT
Start: 2019-10-21 | End: 2019-10-22

## 2019-10-21 RX ORDER — ENOXAPARIN SODIUM 100 MG/ML
40 INJECTION SUBCUTANEOUS EVERY 24 HOURS
Refills: 0 | Status: DISCONTINUED | OUTPATIENT
Start: 2019-10-22 | End: 2019-10-23

## 2019-10-21 RX ORDER — METOPROLOL TARTRATE 50 MG
50 TABLET ORAL DAILY
Refills: 0 | Status: DISCONTINUED | OUTPATIENT
Start: 2019-10-21 | End: 2019-10-23

## 2019-10-21 RX ORDER — FENTANYL CITRATE 50 UG/ML
25 INJECTION INTRAVENOUS
Refills: 0 | Status: DISCONTINUED | OUTPATIENT
Start: 2019-10-21 | End: 2019-10-22

## 2019-10-21 RX ORDER — DOCUSATE SODIUM 100 MG
100 CAPSULE ORAL THREE TIMES A DAY
Refills: 0 | Status: DISCONTINUED | OUTPATIENT
Start: 2019-10-21 | End: 2019-10-23

## 2019-10-21 RX ORDER — MAGNESIUM SULFATE 500 MG/ML
2 VIAL (ML) INJECTION ONCE
Refills: 0 | Status: COMPLETED | OUTPATIENT
Start: 2019-10-21 | End: 2019-10-21

## 2019-10-21 RX ORDER — FINASTERIDE 5 MG/1
5 TABLET, FILM COATED ORAL DAILY
Refills: 0 | Status: DISCONTINUED | OUTPATIENT
Start: 2019-10-21 | End: 2019-10-23

## 2019-10-21 RX ORDER — OXYCODONE HYDROCHLORIDE 5 MG/1
10 TABLET ORAL EVERY 4 HOURS
Refills: 0 | Status: DISCONTINUED | OUTPATIENT
Start: 2019-10-21 | End: 2019-10-23

## 2019-10-21 RX ADMIN — Medication 100 MILLIGRAM(S): at 21:00

## 2019-10-21 RX ADMIN — FINASTERIDE 5 MILLIGRAM(S): 5 TABLET, FILM COATED ORAL at 13:04

## 2019-10-21 RX ADMIN — DEXTROSE MONOHYDRATE, SODIUM CHLORIDE, AND POTASSIUM CHLORIDE 75 MILLILITER(S): 50; .745; 4.5 INJECTION, SOLUTION INTRAVENOUS at 13:05

## 2019-10-21 RX ADMIN — Medication 50 GRAM(S): at 18:29

## 2019-10-21 RX ADMIN — SERTRALINE 50 MILLIGRAM(S): 25 TABLET, FILM COATED ORAL at 13:04

## 2019-10-21 RX ADMIN — Medication 100 MILLIGRAM(S): at 17:40

## 2019-10-21 RX ADMIN — TAMSULOSIN HYDROCHLORIDE 0.4 MILLIGRAM(S): 0.4 CAPSULE ORAL at 21:00

## 2019-10-21 RX ADMIN — Medication 100 MILLIGRAM(S): at 13:04

## 2019-10-21 NOTE — CONSULT NOTE ADULT - ASSESSMENT
ASSESSMENT:  Simón is a very pleasant 73 Year-Old Gentleman now s/p placement of Ventriculoperitoneal shunt placement, SICU consulted for q1h neuro checks postoperatively.     PLAN:  NEURO: S/p  shunt.   - Q1 h neuro checks.   - Postoperative CT pending.   - Pain control with Tylenol, Oxycodone Pro Re Carolynn.    RESPIRATORY: No active issues.   - Satting well on RA.     CARDIOVASCULAR: HTN, paroxysmal afib.   - Continue home enalapril, HCTZ, metoprolol.   - Holding ASA per NSGY.     GI/NUTRITION: No active issues.   - Regular diet as tolerated.   - Continue bowel regimen.     GENITOURINARY/RENAL: BPH.  - Continue home flomax.     HEMATOLOGIC: No active issues.   - DVT ppx: lovenox, sequential compression devices.       INFECTIOUS DISEASE: No active issues.     ENDOCRINE: No active issues.     DISPO: per Neurosurgery, PACU overnight.

## 2019-10-21 NOTE — PROGRESS NOTE ADULT - SUBJECTIVE AND OBJECTIVE BOX
Vital Signs Last 24 Hrs  T(C): 36.6 (21 Oct 2019 10:05), Max: 36.7 (21 Oct 2019 06:45)  T(F): 97.9 (21 Oct 2019 10:05), Max: 98.1 (21 Oct 2019 06:45)  HR: 65 (21 Oct 2019 10:30) (57 - 83)  BP: 169/95 (21 Oct 2019 10:30) (169/95 - 180/100)  BP(mean): 112 (21 Oct 2019 10:30) (101 - 118)  RR: 15 (21 Oct 2019 10:30) (12 - 18)  SpO2: 94% (21 Oct 2019 10:30) (92% - 97%)    EXAM  AOx3, FC, PERRL, EOMI  5/5 throughout, no drift  SILT

## 2019-10-21 NOTE — CONSULT NOTE ADULT - ATTENDING COMMENTS
89 Year-Old man s/p placement of Ventriculoperitoneal shunt placement,   SICU consulted for q1h neuro checks postoperatively.     PLAN:  NEURO: S/p  shunt.   - Q1 h neuro checks.   - Postoperative CT pending.   - Pain control with Tylenol, Oxycodone Pro Re Carolynn.    RESPIRATORY: No active issues.   - on RA.     CARDIOVASCULAR:   -HTN, paroxysmal afib.   - Continue home enalapril, HCTZ, metoprolol.   - Holding ASA per NSGY.     GI/NUTRITION: No active issues.   - Regular diet as tolerated.   - Continue bowel regimen.     GENITOURINARY/RENAL: BPH.  - Continue home flomax.     HEMATOLOGIC: No active issues.   - DVT ppx: lovenox, sequential compression devices.       INFECTIOUS DISEASE: No active issues.     ENDOCRINE: No active issues.     DISPO: Admit to SICU.  Agree with notes of health care providers on my service.  I have personally examined the patient, reviewed data and laboratory tests/x-rays and all pertinent electronic images. The SICU team has a constant risk benefit analyzes discussion with the primary team, all consultants, House Staff and PA's on all decisions.  These diagnoses are unrelated to the surgical procedure noted above.  Time involved in performance of separately billable procedures was not counted toward my critical care time. There is no overlap.

## 2019-10-21 NOTE — PATIENT PROFILE ADULT - NSPROREFERSVCHOMENEURO_GEN_A_NUR
Letter sent.  Tamanna Dick CMA ([facility])    
You placed a referral to GI on 10/20/17.    It is unclear if the patient has scheduled yet, not finding a report showing they were seen.     Please forward to your team if further follow up is needed to see if they have made this appointment.      Thank you!   Deanna/Referral Representative for [facility]    
yes

## 2019-10-21 NOTE — CONSULT NOTE ADULT - SUBJECTIVE AND OBJECTIVE BOX
SURGICAL INTENSIVE CARE UNIT CONSULT NOTE    HPI:  73 male With hx of HTN, BPH. Presents to PST w/ a preop dx of (Idiopathic) normal pressure hydrocephalus and to be evaluated for an insertion of ventriculo peritoneal shunt on 10/21/19. Patient is poor historian, information provided by patient  An MRI done back on 2017 and mild edema to brain noted but monitored, then pt started complaining of dizziness, loss balance, unsteady gait, memory loss 7 mos ago, states he is unable to walk more than a block due to muscular weakness in legs, reports falling 4-5 times in the past 6 months. Went to see his pcp who referred him back to Dr Trevino and MRI repeated, increase in fluid noted and a spinal tap done, fluid drained and felt better. Pt then recommended  shunt for which is now scheduled. Patient was scheduled for August 19,2019, reports he developed palpitations and went to [facility] for eval, noted to have paroxysmal atrial fibrillation, patient has stress and echo test, placed on beta blocker and aspirin. (14 Oct 2019 10:39)    Patient under placement of VPS for NPH on 10/21.   SICU consulted postoperatively for q1H neuro checks.   At time of examination patient AOx2 with no motor or sensory deficits.     PAST MEDICAL HISTORY: Depression  Paroxysmal atrial fibrillation  H/O hydrocephalus  (Idiopathic) normal pressure hydrocephalus  Lumbar stenosis  BPH (benign prostatic hyperplasia)  HTN (hypertension)      PAST SURGICAL HISTORY: S/P cataract surgery  SS (spinal stenosis)  No significant past surgical history      FAMILY HISTORY: FH: HTN (hypertension)  No pertinent family history in first degree relatives      CODE STATUS:     ALLERGIES: No Known Allergies      VITAL SIGNS:  ICU Vital Signs Last 24 Hrs  T(C): 36.7 (21 Oct 2019 06:45), Max: 36.7 (21 Oct 2019 06:45)  T(F): 98.1 (21 Oct 2019 06:45), Max: 98.1 (21 Oct 2019 06:45)  HR: 57 (21 Oct 2019 06:45) (57 - 57)  BP: 170/103 (21 Oct 2019 06:45) (170/103 - 170/103)  BP(mean): --  ABP: --  ABP(mean): --  RR: 18 (21 Oct 2019 06:45) (18 - 18)  SpO2: 97% (21 Oct 2019 06:45) (97% - 97%)      NEURO:   AOx2, states year is 2020. Cranial Nerves II-XII grossly intact. Sensation to light touch intact in face, distal extremities bilaterally. Strength 5/5 in bilateral extremities.     RESPIRATORY  Nonlabored breathing.     CARDIOVASCULAR  Normal sinus rhythm, normocardic.     GI/NUTRITION:  Soft, nontender, nondistended.     GENITOURINARY/RENAL  Weight (kg): 84.8 (10-21 @ 06:45    IMAGING STUDIES:  CT head pending.

## 2019-10-22 ENCOUNTER — TRANSCRIPTION ENCOUNTER (OUTPATIENT)
Age: 74
End: 2019-10-22

## 2019-10-22 DIAGNOSIS — Z98.2 PRESENCE OF CEREBROSPINAL FLUID DRAINAGE DEVICE: ICD-10-CM

## 2019-10-22 LAB
ANION GAP SERPL CALC-SCNC: 10 MMO/L — SIGNIFICANT CHANGE UP (ref 7–14)
BUN SERPL-MCNC: 10 MG/DL — SIGNIFICANT CHANGE UP (ref 7–23)
CALCIUM SERPL-MCNC: 8.7 MG/DL — SIGNIFICANT CHANGE UP (ref 8.4–10.5)
CHLORIDE SERPL-SCNC: 102 MMOL/L — SIGNIFICANT CHANGE UP (ref 98–107)
CO2 SERPL-SCNC: 24 MMOL/L — SIGNIFICANT CHANGE UP (ref 22–31)
CREAT SERPL-MCNC: 0.95 MG/DL — SIGNIFICANT CHANGE UP (ref 0.5–1.3)
GLUCOSE SERPL-MCNC: 104 MG/DL — HIGH (ref 70–99)
HCT VFR BLD CALC: 40.7 % — SIGNIFICANT CHANGE UP (ref 39–50)
HGB BLD-MCNC: 12.7 G/DL — LOW (ref 13–17)
MAGNESIUM SERPL-MCNC: 2.4 MG/DL — SIGNIFICANT CHANGE UP (ref 1.6–2.6)
MCHC RBC-ENTMCNC: 22.5 PG — LOW (ref 27–34)
MCHC RBC-ENTMCNC: 31.2 % — LOW (ref 32–36)
MCV RBC AUTO: 72 FL — LOW (ref 80–100)
PHOSPHATE SERPL-MCNC: 2.5 MG/DL — SIGNIFICANT CHANGE UP (ref 2.5–4.5)
PLATELET # BLD AUTO: 119 K/UL — LOW (ref 150–400)
PMV BLD: 11 FL — SIGNIFICANT CHANGE UP (ref 7–13)
POTASSIUM SERPL-MCNC: 3.9 MMOL/L — SIGNIFICANT CHANGE UP (ref 3.5–5.3)
POTASSIUM SERPL-SCNC: 3.9 MMOL/L — SIGNIFICANT CHANGE UP (ref 3.5–5.3)
RBC # BLD: 5.65 M/UL — SIGNIFICANT CHANGE UP (ref 4.2–5.8)
RBC # FLD: 15.7 % — HIGH (ref 10.3–14.5)
SODIUM SERPL-SCNC: 136 MMOL/L — SIGNIFICANT CHANGE UP (ref 135–145)
WBC # BLD: 10.75 K/UL — HIGH (ref 3.8–10.5)
WBC # FLD AUTO: 10.75 K/UL — HIGH (ref 3.8–10.5)

## 2019-10-22 PROCEDURE — 99232 SBSQ HOSP IP/OBS MODERATE 35: CPT

## 2019-10-22 RX ORDER — ACETAMINOPHEN 500 MG
2 TABLET ORAL
Qty: 0 | Refills: 0 | DISCHARGE
Start: 2019-10-22

## 2019-10-22 RX ORDER — HYDRALAZINE HCL 50 MG
5 TABLET ORAL ONCE
Refills: 0 | Status: DISCONTINUED | OUTPATIENT
Start: 2019-10-22 | End: 2019-10-22

## 2019-10-22 RX ORDER — OXYCODONE HYDROCHLORIDE 5 MG/1
1 TABLET ORAL
Qty: 18 | Refills: 0
Start: 2019-10-22 | End: 2019-10-24

## 2019-10-22 RX ORDER — HYDRALAZINE HCL 50 MG
10 TABLET ORAL ONCE
Refills: 0 | Status: DISCONTINUED | OUTPATIENT
Start: 2019-10-22 | End: 2019-10-22

## 2019-10-22 RX ORDER — METOPROLOL TARTRATE 50 MG
1 TABLET ORAL
Qty: 0 | Refills: 0 | DISCHARGE
Start: 2019-10-22

## 2019-10-22 RX ORDER — SERTRALINE 25 MG/1
1 TABLET, FILM COATED ORAL
Qty: 0 | Refills: 0 | DISCHARGE

## 2019-10-22 RX ORDER — DOCUSATE SODIUM 100 MG
1 CAPSULE ORAL
Qty: 0 | Refills: 0 | DISCHARGE
Start: 2019-10-22

## 2019-10-22 RX ORDER — METOPROLOL TARTRATE 50 MG
5 TABLET ORAL ONCE
Refills: 0 | Status: COMPLETED | OUTPATIENT
Start: 2019-10-22 | End: 2019-10-23

## 2019-10-22 RX ORDER — ASPIRIN/CALCIUM CARB/MAGNESIUM 324 MG
2 TABLET ORAL
Qty: 0 | Refills: 0 | DISCHARGE

## 2019-10-22 RX ADMIN — OXYCODONE HYDROCHLORIDE 10 MILLIGRAM(S): 5 TABLET ORAL at 22:00

## 2019-10-22 RX ADMIN — Medication 100 MILLIGRAM(S): at 02:00

## 2019-10-22 RX ADMIN — Medication 100 MILLIGRAM(S): at 10:51

## 2019-10-22 RX ADMIN — SERTRALINE 50 MILLIGRAM(S): 25 TABLET, FILM COATED ORAL at 12:25

## 2019-10-22 RX ADMIN — Medication 20 MILLIGRAM(S): at 18:23

## 2019-10-22 RX ADMIN — Medication 100 MILLIGRAM(S): at 13:54

## 2019-10-22 RX ADMIN — Medication 25 MILLIGRAM(S): at 06:05

## 2019-10-22 RX ADMIN — Medication 100 MILLIGRAM(S): at 06:05

## 2019-10-22 RX ADMIN — TAMSULOSIN HYDROCHLORIDE 0.4 MILLIGRAM(S): 0.4 CAPSULE ORAL at 21:09

## 2019-10-22 RX ADMIN — Medication 20 MILLIGRAM(S): at 06:08

## 2019-10-22 RX ADMIN — ENOXAPARIN SODIUM 40 MILLIGRAM(S): 100 INJECTION SUBCUTANEOUS at 18:44

## 2019-10-22 RX ADMIN — OXYCODONE HYDROCHLORIDE 10 MILLIGRAM(S): 5 TABLET ORAL at 21:09

## 2019-10-22 RX ADMIN — FINASTERIDE 5 MILLIGRAM(S): 5 TABLET, FILM COATED ORAL at 12:24

## 2019-10-22 RX ADMIN — Medication 50 MILLIGRAM(S): at 08:56

## 2019-10-22 RX ADMIN — Medication 100 MILLIGRAM(S): at 21:09

## 2019-10-22 NOTE — PROGRESS NOTE ADULT - SUBJECTIVE AND OBJECTIVE BOX
SURGICAL INTENSIVE CARE UNIT DAILY PROGRESS NOTE    24 HOUR EVENTS:   Postoperative CT obtained with expected postsurgical changes.   No acute events overnight.   Remains afebrile, hemodynamically stable.     HPI:  73 male With hx of HTN, BPH. Presents to PST w/ a preop dx of (Idiopathic) normal pressure hydrocephalus and to be evaluated for an insertion of ventriculo peritoneal shunt on 10/21/19. Patient is poor historian, information provided by patient  An MRI done back on 2017 and mild edema to brain noted but monitored, then pt started complaining of dizziness, loss balance, unsteady gait, memory loss 7 mos ago, states he is unable to walk more than a block due to muscular weakness in legs, reports falling 4-5 times in the past 6 months. Went to see his pcp who referred him back to Dr Trevino and MRI repeated, increase in fluid noted and a spinal tap done, fluid drained and felt better. Pt then recommended  shunt for which is now scheduled. Patient was scheduled for August 19,2019, reports he developed palpitations and went to [facility] for eval, noted to have paroxysmal atrial fibrillation, patient has stress and echo test, placed on beta blocker and aspirin. (14 Oct 2019 10:39)      SUBJECTIVE/ROS:  [x ] A ten-point review of systems was otherwise negative except as noted.      NEURO: AOx2. Cranial Nerves II-XII grossly intact. Sensation to light touch intact in face, distal extremities bilaterally. Strength 5/5 in bilateral extremities.       RESPIRATORY  RR: 21 (10-21-19 @ 23:00) (11 - 21)  SpO2: 95% (10-21-19 @ 23:00) (92% - 99%)  Wt(kg): --  Mechanical Ventilation:       CARDIOVASCULAR  HR: 60 (10-21-19 @ 23:00) (55 - 83)  BP: 139/80 (10-21-19 @ 23:00) (127/77 - 180/100)  BP(mean): 93 (10-21-19 @ 23:00) (84 - 118)      GI/NUTRITION  Diet:    GENITOURINARY  I&O's Detail    10-21 @ 07:01  -  10-22 @ 00:40  --------------------------------------------------------  IN:    IV PiggyBack: 150 mL    Oral Fluid: 870 mL    sodium chloride 0.9% with potassium chloride 20 mEq/L: 675 mL  Total IN: 1695 mL    OUT:    Voided: 1375 mL  Total OUT: 1375 mL    Total NET: 320 mL        Weight (kg): 84.8 (10-21 @ 06:45)  10-21    136  |  101  |  10  ----------------------------<  121<H>  4.0   |  23  |  0.87    Ca    8.4      21 Oct 2019 15:45  Phos  3.0     10-21  Mg     1.7     10-21        HEMATOLOGIC                        12.3   12.26 )-----------( 122      ( 21 Oct 2019 15:45 )             39.6     PT/INR - ( 21 Oct 2019 15:45 )   PT: 12.2 SEC;   INR: 1.10          PTT - ( 21 Oct 2019 15:45 )  PTT:32.7 SEC    INFECTIOUS DISEASES  RECENT CULTURES:  Specimen Source: CEREBRAL SPINAL FLUID  Date/Time: 10-21 @ 11:07      IMAGING:  < from: CT Head No Cont (10.21.19 @ 14:35) >  FINDINGS:    Interval right frontal macie hole and right frontal ventriculoperitoneal   shunt catheter, with tip in the anterior horn of the right lateral   ventricle with expected postsurgical sequela including pneumocephalus.   Lateral ventricles remain moderately enlarged with a transverse   measurement of 4.4 centers, third ventricle is mildly enlarged with   transverse measurement of 1.1 cm unchanged from prior. Redemonstration of   volume loss and a left middle fossa arachnoid cyst with bony remodeling,   and nonspecific white matter decreased attenuation likely microvascular   disease. No new large intraparenchymal hemorrhage or midline shift.   Unchanged dolichoectatic intradural circulation which may be seen with   hypertension.    There are absent orbital lenses with prior cataract surgery. Paranasal   sinuses and mastoid air cells remain unchanged from prior.    IMPRESSION:    Interval placement of right frontal ventriculoperitoneal shunt catheter,   with tip in the right lateral ventricle frontal horn, with pneumocephalus   and expected post surgical sequela, unchanged left middle cranial fossa   arachnoid cyst. No new large intraparenchymal hemorrhage or midline shift.      < end of copied text >

## 2019-10-22 NOTE — PROVIDER CONTACT NOTE (OTHER) - DATE AND TIME:
22-Oct-2019 09:35
22-Oct-2019 10:05
22-Oct-2019 11:30
22-Oct-2019 17:38
22-Oct-2019 18:00
22-Oct-2019 21:37
22-Oct-2019 12:20

## 2019-10-22 NOTE — DISCHARGE NOTE PROVIDER - HOSPITAL COURSE
73 male With hx of HTN, BPH. Presents to PST w/ a preop dx of (Idiopathic) normal pressure hydrocephalus and to be evaluated for an insertion of ventriculo peritoneal shunt on 10/21/19. Patient is poor historian, information provided by patient  An MRI done back on 2017 and mild edema to brain noted but monitored, then pt started complaining of dizziness, loss balance, unsteady gait, memory loss 7 mos ago, states he is unable to walk more than a block due to muscular weakness in legs, reports falling 4-5 times in the past 6 months. Went to see his pcp who referred him back to Dr Trevino and MRI repeated, increase in fluid noted and a spinal tap done, fluid drained and felt better. Pt then recommended  shunt for which is now scheduled. Patient was scheduled for August 19,2019, reports he developed palpitations and went to [facility] for eval, noted to have paroxysmal atrial fibrillation, patient has stress and echo test, placed on beta blocker and aspirin. 73 male With hx of HTN, BPH. Presents to PST w/ a preop dx of (Idiopathic) normal pressure hydrocephalus and to be evaluated for an insertion of ventriculo peritoneal shunt on 10/21/19. An MRI done back on 2017 and mild edema to brain noted but monitored, then pt started complaining of dizziness, loss balance, unsteady gait, memory loss 7 mos ago, states he is unable to walk more than a block due to muscular weakness in legs, reports falling 4-5 times in the past 6 months. Went to see his pcp who referred him back to Dr Trevino and MRI repeated, increase in fluid noted and a spinal tap done, fluid drained and felt better. Pt then recommended  shunt for which is now scheduled. Patient was scheduled for August 19,2019, reports he developed palpitations and went to [facility] for eval, noted to have paroxysmal atrial fibrillation, patient has stress and echo test, placed on beta blocker and aspirin.             Patient underwent insertion of Ventriculoperitoneal shunt on 10/21/19 with strata 2 @2.0. Patient tolerated procedure well, tolerating regular diet. Stable for discharge.

## 2019-10-22 NOTE — PROVIDER CONTACT NOTE (OTHER) - ASSESSMENT
Pt resting in bed. Denies any chest pain, palpitations or SOB.
Patient axox3. 153/106, hr 69. Voided approximately 1000 cc within an hour.
Pt A &O x4. Neurostatus intact.
Pt A&Ox4. Resting in bed. Denies any chest pain, palpitations or SOB.
Pt resting in bed. Pt A&O x4. Denies any chest pain, palpitations or SOB
Pt resting in bed. Pt A&O x4. No c/o pain. Pt denies any chest pain, palpitations or SOB.
Pt resting in bed. denies chest pain, palpitations or SOB.

## 2019-10-22 NOTE — PROVIDER CONTACT NOTE (OTHER) - ACTION/TREATMENT ORDERED:
No need to give hydralazine. Will monitor.
Hydralazine 10mg IVP
Hydralazine 5mg IVP
Hydralazine d'cd . Enalapril 20mg PO ordered and given.
No new interventions at this time.
Will re-evaluate in an hour post pain med administration.
MITA Mooney at bedside to assess. Family at bedside.

## 2019-10-22 NOTE — DISCHARGE NOTE PROVIDER - CARE PROVIDER_API CALL
Dk King (MD)  Neurological Surgery; Pediatric Neurological Surgery  [address], [address]  [place], [place] [id number]  Phone: [phone]  Fax: [phone]  Follow Up Time:

## 2019-10-22 NOTE — PHYSICAL THERAPY INITIAL EVALUATION ADULT - GAIT DEVIATIONS NOTED, PT EVAL
decreased weight-shifting ability/decreased step length/decreased stride length/increased time in double stance

## 2019-10-22 NOTE — PHYSICAL THERAPY INITIAL EVALUATION ADULT - ADDITIONAL COMMENTS
Pt. reports owning DME of rolling walker.    Pt. was left seated at edge of bed post PT Evaluation, no apparent distress, family present.

## 2019-10-22 NOTE — PHYSICAL THERAPY INITIAL EVALUATION ADULT - CRITERIA FOR SKILLED THERAPEUTIC INTERVENTIONS
predicted duration of therapy intervention/rehab potential/impairments found/therapy frequency/anticipated discharge recommendation

## 2019-10-22 NOTE — PROGRESS NOTE ADULT - SUBJECTIVE AND OBJECTIVE BOX
No issues overnight  ICU Vital Signs Last 24 Hrs  T(C): 36.9 (21 Oct 2019 23:00), Max: 36.9 (21 Oct 2019 23:00)  T(F): 98.4 (21 Oct 2019 23:00), Max: 98.4 (21 Oct 2019 23:00)  HR: 63 (22 Oct 2019 00:00) (55 - 83)  BP: 103/62 (22 Oct 2019 00:00) (103/62 - 180/100)  BP(mean): 72 (22 Oct 2019 00:00) (72 - 118)  ABP: --  ABP(mean): --  RR: 13 (22 Oct 2019 00:00) (11 - 21)  SpO2: 96% (22 Oct 2019 00:00) (92% - 99%)    AAO to self, place, and month  CARMINA ALVAREZ  CN 2-12 grossly intact  DEAN strength 5/5  SILT    MEDICATIONS  (STANDING):  ceFAZolin   IVPB 2000 milliGRAM(s) IV Intermittent every 8 hours  docusate sodium 100 milliGRAM(s) Oral three times a day  enalapril 20 milliGRAM(s) Oral daily  enoxaparin Injectable 40 milliGRAM(s) SubCutaneous every 24 hours  finasteride 5 milliGRAM(s) Oral daily  hydrochlorothiazide 25 milliGRAM(s) Oral daily  metoprolol succinate ER 50 milliGRAM(s) Oral daily  sertraline 50 milliGRAM(s) Oral daily  tamsulosin 0.4 milliGRAM(s) Oral at bedtime    MEDICATIONS  (PRN):  acetaminophen   Tablet .. 650 milliGRAM(s) Oral every 6 hours PRN Temp greater or equal to 38C (100.4F), Mild Pain (1 - 3)  fentaNYL    Injectable 25 MICROGram(s) IV Push every 5 minutes PRN Moderate Pain (4 - 6)  fentaNYL    Injectable 50 MICROGram(s) IV Push every 15 minutes PRN Severe Pain (7 - 10)  ondansetron Injectable 4 milliGRAM(s) IV Push once PRN Nausea and/or Vomiting  oxyCODONE    IR 5 milliGRAM(s) Oral every 4 hours PRN Moderate Pain (4 - 6)  oxyCODONE    IR 10 milliGRAM(s) Oral every 4 hours PRN Severe Pain (7 - 10)                          12.3   12.26 )-----------( 122      ( 21 Oct 2019 15:45 )             39.6     10-21    136  |  101  |  10  ----------------------------<  121<H>  4.0   |  23  |  0.87    Ca    8.4      21 Oct 2019 15:45  Phos  3.0     10-21  Mg     1.7     10-21    < from: CT Head No Cont (10.21.19 @ 14:35) >  Interval right frontal macie hole and right frontal ventriculoperitoneal   shunt catheter, with tip in the anterior horn of the right lateral   ventricle with expected postsurgical sequela including pneumocephalus.   Lateral ventricles remain moderately enlarged with a transverse   measurement of 4.4 centers, third ventricle is mildly enlarged with   transverse measurement of 1.1 cm unchanged from prior. Redemonstration of   volume loss and a left middle fossa arachnoid cyst with bony remodeling,   and nonspecific white matter decreased attenuation likely microvascular   disease. No new large intraparenchymal hemorrhage or midline shift.   Unchanged dolichoectatic intradural circulation which may be seen with   hypertension.    There are absent orbital lenses with prior cataract surgery. Paranasal   sinuses and mastoid air cells remain unchanged from prior.    IMPRESSION:    Interval placement of right frontal ventriculoperitoneal shunt catheter,   with tip in the right lateral ventricle frontal horn, with pneumocephalus   and expected post surgical sequela, unchanged left middle cranial fossa   arachnoid cyst. No new large intraparenchymal hemorrhage or midline shift.    < end of copied text >

## 2019-10-22 NOTE — PROVIDER CONTACT NOTE (OTHER) - BACKGROUND
Pt s/p  shunt placement.
Patient is s/p  Shunt revision.
Pt s/p  shunt
Pt s/p  shunt placement
Pt s/p  shunt placement
Pt s/p  shunt placement.
Pt s/p  shunt placement. Just received from PACU.
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
## 2019-10-22 NOTE — PROGRESS NOTE ADULT - ASSESSMENT
Simón is a very pleasant 73 Year-Old Gentleman now s/p placement of Ventriculoperitoneal shunt placement, SICU consulted for q1h neuro checks postoperatively.     PLAN:  NEURO: S/p  shunt.   - Q1 h neuro checks.   - Pain control with Tylenol, Oxycodone Pro Re Carolynn.    RESPIRATORY: No active issues.   - Satting well on RA.     CARDIOVASCULAR: HTN, paroxysmal afib.   - Continue home enalapril, HCTZ, metoprolol.   - Holding ASA per NSGY.     GI/NUTRITION: No active issues.   - Regular diet as tolerated.   - Continue bowel regimen.     GENITOURINARY/RENAL: BPH.  - Continue home flomax.     HEMATOLOGIC: No active issues.   - DVT ppx: lovenox, sequential compression devices.     INFECTIOUS DISEASE: No active issues.     ENDOCRINE: No active issues.     DISPO: per Neurosurgery.   Surgical Intensive Care Unit to Sign Off, please do not hesistate to reconsult for any questions or concerns.

## 2019-10-22 NOTE — PHYSICAL THERAPY INITIAL EVALUATION ADULT - GENERAL OBSERVATIONS, REHAB EVAL
Consult received, chart reviewed. Patient received standing at bedside, no apparent distress, family present. Patient agreed to Evaluation from Physical Therapist.

## 2019-10-22 NOTE — DISCHARGE NOTE PROVIDER - NSDCCPCAREPLAN_GEN_ALL_CORE_FT
PRINCIPAL DISCHARGE DIAGNOSIS  Diagnosis: Normal pressure hydrocephalus  Assessment and Plan of Treatment:       SECONDARY DISCHARGE DIAGNOSES  Diagnosis: Hypertension  Assessment and Plan of Treatment:

## 2019-10-22 NOTE — PROVIDER CONTACT NOTE (OTHER) - REASON
Patient b/p 153/106, hr 69 and frequent voids
Pt seems delirious as per family.
Pt with /75
Pt with /91
Pt with /94
Pt's family refused hydralazine.
Pt with /60

## 2019-10-22 NOTE — PROGRESS NOTE ADULT - ATTENDING COMMENTS
73 Year-Old Gentleman now s/p placement of Ventriculoperitoneal shunt placement, SICU consulted for q1h neuro checks postoperatively.     PLAN:  NEURO: S/p  shunt.   - Q1 h neuro checks.   - Pain control with Tylenol, Oxycodone Pro Re Carolynn.    RESPIRATORY: No active issues.   - Satting well on RA.     CARDIOVASCULAR: HTN, paroxysmal afib.   - Continue home enalapril, HCTZ, metoprolol.   - Holding ASA per NSGY.     GI/NUTRITION: No active issues.   - Regular diet as tolerated.   - Continue bowel regimen.     GENITOURINARY/RENAL: BPH.  - Continue home flomax.     HEMATOLOGIC: No active issues.   - DVT ppx: lovenox, sequential compression devices.     INFECTIOUS DISEASE: No active issues.     ENDOCRINE: No active issues.     DISPO: per Neurosurgery.   Surgical Intensive Care Unit to Sign Off, please do not hesistate to reconsult for any questions or concerns.     Agree with notes of health care providers on my service.  The patient is not a critical care patient with no life threatening hemodynamic and metabolic instability in SICU with a diagnosis as mentioned above.  I have personally examined the patient, reviewed data and laboratory tests/x-rays and all pertinent electronic images. The SICU team has a constant risk benefit analyzes discussion with the primary team, all consultants, House Staff and PA's on all decisions.  These diagnoses are unrelated to the surgical procedure noted above.  There is no overlap.  Some time excludes periods spent on separate procedures and teaching as discussed above.

## 2019-10-22 NOTE — DISCHARGE NOTE PROVIDER - NSDCFUADDINST_GEN_ALL_CORE_FT
1. Do not drive while taking narcotics, seizure meds or pain medications.   2. Do not lift more than 5-10 lbs. , no contact sports, or gym classes till cleared by neurosurgeon.  3.  You may shower or shampoo your incision 4 days after surgery. You can let soap and water run on it and pat it dry.   4.  Keep incision clean and dry and do not use any ointments.   5.  Report to the ED for persistent fever, vomiting, headaches not relieved by medications, drainage from the incision or any change in neurologic or mental status or seizures.   6.  Do not take any NSAID's, aspirin, coumadin, plavix or any other blood thinners till cleared by neurosurgeon.  7.  Do not return to work or school till cleared by neurosurgeon.  8. Call the office tomorrow to make a follow up appointment with neurosurgery.

## 2019-10-22 NOTE — PROVIDER CONTACT NOTE (OTHER) - SITUATION
MITA Jordan #[id number] informed that patient b/p is 153/106. Patient also c/o abdominal and head surgical pain
On arrival from PACU, pt's /75, HR 74.
Pt with /91, HR 64
Pt with /94, HR 69
Pt with /94, HR 69. PA ordered hydralazine IVP. But the family refusing it. They states pt takes enalapril 40mg PO daily and pt only got 20mg.
Pt's daughter and spouse concerned about the pt. According to them he seems delirious.
At 11:25 pt's /91, HR 64. Hydralazine 5mg ordered. Prior to the administration of hydralazine, /60, HR 69.

## 2019-10-23 ENCOUNTER — TRANSCRIPTION ENCOUNTER (OUTPATIENT)
Age: 74
End: 2019-10-23

## 2019-10-23 VITALS
HEART RATE: 59 BPM | DIASTOLIC BLOOD PRESSURE: 70 MMHG | SYSTOLIC BLOOD PRESSURE: 135 MMHG | RESPIRATION RATE: 16 BRPM | TEMPERATURE: 98 F | OXYGEN SATURATION: 95 %

## 2019-10-23 LAB
ANION GAP SERPL CALC-SCNC: 13 MMO/L — SIGNIFICANT CHANGE UP (ref 7–14)
BUN SERPL-MCNC: 10 MG/DL — SIGNIFICANT CHANGE UP (ref 7–23)
CALCIUM SERPL-MCNC: 9 MG/DL — SIGNIFICANT CHANGE UP (ref 8.4–10.5)
CHLORIDE SERPL-SCNC: 103 MMOL/L — SIGNIFICANT CHANGE UP (ref 98–107)
CO2 SERPL-SCNC: 25 MMOL/L — SIGNIFICANT CHANGE UP (ref 22–31)
CREAT SERPL-MCNC: 0.87 MG/DL — SIGNIFICANT CHANGE UP (ref 0.5–1.3)
GLUCOSE SERPL-MCNC: 111 MG/DL — HIGH (ref 70–99)
POTASSIUM SERPL-MCNC: 3.9 MMOL/L — SIGNIFICANT CHANGE UP (ref 3.5–5.3)
POTASSIUM SERPL-SCNC: 3.9 MMOL/L — SIGNIFICANT CHANGE UP (ref 3.5–5.3)
SODIUM SERPL-SCNC: 141 MMOL/L — SIGNIFICANT CHANGE UP (ref 135–145)

## 2019-10-23 RX ADMIN — Medication 25 MILLIGRAM(S): at 05:32

## 2019-10-23 RX ADMIN — Medication 5 MILLIGRAM(S): at 00:09

## 2019-10-23 RX ADMIN — Medication 40 MILLIGRAM(S): at 05:32

## 2019-10-23 RX ADMIN — Medication 100 MILLIGRAM(S): at 05:31

## 2019-10-23 RX ADMIN — Medication 50 MILLIGRAM(S): at 05:32

## 2019-10-23 NOTE — DISCHARGE NOTE NURSING/CASE MANAGEMENT/SOCIAL WORK - NSDCPEEMAIL_GEN_ALL_CORE
[facility] for Tobacco Control email [email or web address]

## 2019-10-23 NOTE — DISCHARGE NOTE NURSING/CASE MANAGEMENT/SOCIAL WORK - NSDCPEWEB_GEN_ALL_CORE
[facility] for Tobacco Control website --- [email or web address] website --- [email or web address]

## 2019-10-23 NOTE — DISCHARGE NOTE NURSING/CASE MANAGEMENT/SOCIAL WORK - PATIENT PORTAL LINK FT
You can access the FollowMyHealth Patient Portal offered by [facility] by registering at the following website: [email or web address]. By joining [employer]’s FollowMyHealth portal, you will also be able to view your health information using other applications (apps) compatible with our system.

## 2019-10-23 NOTE — CHART NOTE - NSCHARTNOTEFT_GEN_A_CORE
CAPRINI SCORE [CLOT]    AGE RELATED RISK FACTORS                                                       MOBILITY RELATED FACTORS  [ ] Age 41-60 years                                            (1 Point)                  [ ] Bed rest                                                        (1 Point)  [X ] Age: 61-74 years                                           (2 Points)                 [ ] Plaster cast                                                   (2 Points)  [ ] Age= 75 years                                              (3 Points)                 [ ] Bed bound for more than 72 hours                 (2 Points)    DISEASE RELATED RISK FACTORS                                               GENDER SPECIFIC FACTORS  [ ] Edema in the lower extremities                       (1 Point)                  [ ] Pregnancy                                                     (1 Point)  [ ] Varicose veins                                               (1 Point)                  [ ] Post-partum < 6 weeks                                   (1 Point)             [ ] BMI > 25 Kg/m2                                            (1 Point)                  [ ] Hormonal therapy  or oral contraception          (1 Point)                 [ ] Sepsis (in the previous month)                        (1 Point)                  [ ] History of pregnancy complications                 (1 point)  [ ] Pneumonia or serious lung disease                                               [ ] Unexplained or recurrent                     (1 Point)           (in the previous month)                               (1 Point)  [ ] Abnormal pulmonary function test                     (1 Point)                 SURGERY RELATED RISK FACTORS  [ ] Acute myocardial infarction                              (1 Point)                 [ ]  Section                                             (1 Point)  [ ] Congestive heart failure (in the previous month)  (1 Point)               [ ] Minor surgery                                                  (1 Point)   [ ] Inflammatory bowel disease                             (1 Point)                 [ ] Arthroscopic surgery                                        (2 Points)  [ ] Central venous access                                      (2 Points)                [X ] General surgery lasting more than 45 minutes   (2 Points)       [ ] Stroke (in the previous month)                          (5 Points)               [ ] Elective arthroplasty                                         (5 Points)                                                                                                                                               HEMATOLOGY RELATED FACTORS                                                 TRAUMA RELATED RISK FACTORS  [ ] Prior episodes of VTE                                     (3 Points)                [ ] Fracture of the hip, pelvis, or leg                       (5 Points)  [ ] Positive family history for VTE                         (3 Points)                 [ ] Acute spinal cord injury (in the previous month)  (5 Points)  [ ] Prothrombin 46345 A                                     (3 Points)                 [ ] Paralysis  (less than 1 month)                             (5 Points)  [ ] Factor V Leiden                                             (3 Points)                  [ ] Multiple Trauma within 1 month                        (5 Points)  [ ] Lupus anticoagulants                                     (3 Points)                                                           [ ] Anticardiolipin antibodies                               (3 Points)                                                       [ ] High homocysteine in the blood                      (3 Points)                                             [ ] Other congenital or acquired thrombophilia      (3 Points)                                                [ ] Heparin induced thrombocytopenia                  (3 Points)                                          Total Score [     3     ]    Caprini Score 0 - 2:  Low Risk, No VTE Prophylaxis required for most patients, encourage ambulation  Caprini Score 3 - 6:  At Risk, pharmacologic VTE prophylaxis is indicated for most patients (in the absence of a contraindication)  Caprini Score Greater than or = 7:  High Risk, pharmacologic VTE prophylaxis is indicated for most patients (in the absence of a contraindication)

## 2019-10-23 NOTE — DISCHARGE NOTE NURSING/CASE MANAGEMENT/SOCIAL WORK - NSDCPNINST_GEN_ALL_CORE
Call MD or come to ER for fever, chills, increased confusion, nausea/vomiting, pain not controlled with pain meds or shortness of breath.

## 2019-10-23 NOTE — PROGRESS NOTE ADULT - SUBJECTIVE AND OBJECTIVE BOX
No issues overnight  Patient much calmer and more oriented  Vital Signs Last 24 Hrs  T(C): 36.7 (23 Oct 2019 00:37), Max: 36.9 (22 Oct 2019 21:09)  T(F): 98 (23 Oct 2019 00:37), Max: 98.4 (22 Oct 2019 21:09)  HR: 107 (23 Oct 2019 00:37) (63 - 107)  BP: 143/88 (23 Oct 2019 00:37) (124/68 - 179/94)  BP(mean): 90 (22 Oct 2019 08:00) (75 - 102)  RR: 17 (23 Oct 2019 00:37) (16 - 20)  SpO2: 94% (23 Oct 2019 00:37) (94% - 98%)    AAO X 3  PERRLA, EOMI  CN 2-12 grossly intact  DEAN strength 5/5  SILT    MEDICATIONS  (STANDING):  docusate sodium 100 milliGRAM(s) Oral three times a day  enalapril 40 milliGRAM(s) Oral daily  enoxaparin Injectable 40 milliGRAM(s) SubCutaneous every 24 hours  finasteride 5 milliGRAM(s) Oral daily  hydrochlorothiazide 25 milliGRAM(s) Oral daily  metoprolol succinate ER 50 milliGRAM(s) Oral daily  sertraline 50 milliGRAM(s) Oral daily  tamsulosin 0.4 milliGRAM(s) Oral at bedtime    MEDICATIONS  (PRN):  acetaminophen   Tablet .. 650 milliGRAM(s) Oral every 6 hours PRN Temp greater or equal to 38C (100.4F), Mild Pain (1 - 3)  oxyCODONE    IR 5 milliGRAM(s) Oral every 4 hours PRN Moderate Pain (4 - 6)  oxyCODONE    IR 10 milliGRAM(s) Oral every 4 hours PRN Severe Pain (7 - 10)                          12.7   10.75 )-----------( 119      ( 22 Oct 2019 06:38 )             40.7     10-22    136  |  102  |  10  ----------------------------<  104<H>  3.9   |  24  |  0.95    Ca    8.7      22 Oct 2019 06:38  Phos  2.5     10-22  Mg     2.4     10-22

## 2019-10-26 LAB — BACTERIA CSF CULT: SIGNIFICANT CHANGE UP

## 2020-02-10 PROBLEM — F32.9 MAJOR DEPRESSIVE DISORDER, SINGLE EPISODE, UNSPECIFIED: Chronic | Status: ACTIVE | Noted: 2019-10-14

## 2020-02-10 PROBLEM — I48.0 PAROXYSMAL ATRIAL FIBRILLATION: Chronic | Status: ACTIVE | Noted: 2019-10-14

## 2020-02-10 PROBLEM — Z86.69 PERSONAL HISTORY OF OTHER DISEASES OF THE NERVOUS SYSTEM AND SENSE ORGANS: Chronic | Status: ACTIVE | Noted: 2019-10-14

## 2020-02-14 ENCOUNTER — OUTPATIENT (OUTPATIENT)
Dept: OUTPATIENT SERVICES | Facility: HOSPITAL | Age: 75
LOS: 1 days | End: 2020-02-14
Payer: MEDICARE

## 2020-02-14 ENCOUNTER — APPOINTMENT (OUTPATIENT)
Dept: MRI IMAGING | Facility: CLINIC | Age: 75
End: 2020-02-14
Payer: MEDICARE

## 2020-02-14 DIAGNOSIS — G91.2 (IDIOPATHIC) NORMAL PRESSURE HYDROCEPHALUS: ICD-10-CM

## 2020-02-14 DIAGNOSIS — M48.00 SPINAL STENOSIS, SITE UNSPECIFIED: Chronic | ICD-10-CM

## 2020-02-14 DIAGNOSIS — Z98.49 CATARACT EXTRACTION STATUS, UNSPECIFIED EYE: Chronic | ICD-10-CM

## 2020-02-14 PROCEDURE — 70551 MRI BRAIN STEM W/O DYE: CPT

## 2020-02-14 PROCEDURE — 70551 MRI BRAIN STEM W/O DYE: CPT | Mod: 26

## 2020-05-20 ENCOUNTER — TRANSCRIPTION ENCOUNTER (OUTPATIENT)
Age: 75
End: 2020-05-20

## 2020-06-25 NOTE — PRE-OP CHECKLIST - IV STARTED
Head , normocephalic , atraumatic , Face , Face within normal limits , Ears , External ears within normal limits , Nose/Nasopharynx , External nose  normal appearance , Mouth and Throat , Oral cavity appearance normal
no

## 2020-07-15 ENCOUNTER — EMERGENCY (EMERGENCY)
Facility: HOSPITAL | Age: 75
LOS: 1 days | Discharge: HOME | End: 2020-07-15
Attending: EMERGENCY MEDICINE
Payer: SELF-PAY

## 2020-07-15 VITALS
HEIGHT: 67 IN | OXYGEN SATURATION: 96 % | WEIGHT: 179.9 LBS | RESPIRATION RATE: 18 BRPM | DIASTOLIC BLOOD PRESSURE: 90 MMHG | TEMPERATURE: 98 F | HEART RATE: 68 BPM | SYSTOLIC BLOOD PRESSURE: 130 MMHG

## 2020-07-15 DIAGNOSIS — M48.00 SPINAL STENOSIS, SITE UNSPECIFIED: Chronic | ICD-10-CM

## 2020-07-15 DIAGNOSIS — Z98.49 CATARACT EXTRACTION STATUS, UNSPECIFIED EYE: Chronic | ICD-10-CM

## 2020-07-15 PROCEDURE — 99284 EMERGENCY DEPT VISIT MOD MDM: CPT

## 2020-07-15 PROCEDURE — 72125 CT NECK SPINE W/O DYE: CPT

## 2020-07-15 PROCEDURE — 72125 CT NECK SPINE W/O DYE: CPT | Mod: 26

## 2020-07-15 PROCEDURE — 99284 EMERGENCY DEPT VISIT MOD MDM: CPT | Mod: 25

## 2020-07-15 PROCEDURE — 70450 CT HEAD/BRAIN W/O DYE: CPT | Mod: 26

## 2020-07-15 PROCEDURE — 70450 CT HEAD/BRAIN W/O DYE: CPT

## 2020-07-15 NOTE — ED ADULT NURSE NOTE - NSIMPLEMENTINTERV_GEN_ALL_ED
Implemented All Universal Safety Interventions:  [place] to call system. Call bell, personal items and telephone within reach. Instruct patient to call for assistance. Room bathroom lighting operational. Non-slip footwear when patient is off stretcher. Physically safe environment: no spills, clutter or unnecessary equipment. Stretcher in lowest position, wheels locked, appropriate side rails in place.
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
## 2020-07-15 NOTE — ED PROVIDER NOTE - PATIENT PORTAL LINK FT
You can access the FollowMyHealth Patient Portal offered by [facility] by registering at the following website: [email or web address]. By joining [employer]’s FollowMyHealth portal, you will also be able to view your health information using other applications (apps) compatible with our system.

## 2020-07-15 NOTE — ED PROVIDER NOTE - MUSCULOSKELETAL, MLM
Spine appears normal, range of motion is not limited, no muscle or joint tenderness, no CVAT, neck NT to palp.

## 2020-10-15 ENCOUNTER — TRANSCRIPTION ENCOUNTER (OUTPATIENT)
Age: 75
End: 2020-10-15

## 2020-12-01 ENCOUNTER — TRANSCRIPTION ENCOUNTER (OUTPATIENT)
Age: 75
End: 2020-12-01

## 2021-01-04 NOTE — ED PROVIDER NOTE - CROS ED SKIN ALL NEG
Patient is past due for her follow up and gyn exam please help patient schedule to address her refill request.    negative...

## 2021-01-14 NOTE — DISCHARGE NOTE PROVIDER - NSDCHC_MEDRECSTATUS_GEN_ALL_CORE
Internal Medicine Progress Note      Subjective     Resting quietly this AM; tolerated increased activity yesterday; diarrhea due to lactulose continues.  Pt denies any CP/SOB/Palp, nausea or vomiting, abdominal pain, fevers, chills, or itching at this time     Objective     I/O's    Intake/Output Summary (Last 24 hours) at 1/14/2021 1024  Last data filed at 1/14/2021 0900  Gross per 24 hour   Intake 120 ml   Output --   Net 120 ml       Last Recorded Vitals  Vitals with min/max:  Vital Last Value 24 Hour Range   Temperature 97.3 °F (36.3 °C) (01/14/21 0800) Temp  Min: 97.3 °F (36.3 °C)  Max: 98.1 °F (36.7 °C)   Pulse 69 (01/14/21 0800) Pulse  Min: 65  Max: 75   Respiratory 18 (01/14/21 0800) Resp  Min: 14  Max: 18   Non-Invasive  Blood Pressure (!) 148/64 (01/14/21 0800) BP  Min: 126/61  Max: 205/92   Pulse Oximetry 97 % (01/14/21 0800) SpO2  Min: 96 %  Max: 100 %   Arterial   Blood Pressure   No data recorded      Body mass index is 20.85 kg/m².    Physical Exam: sleeping, but arousable and becomes attentive    SKIN:warm and clear    HEENT:clear    NECK:supple with equal carotid pulses    CHEST:nontender with adequate excursion    LUNGS:clear    HEART:RR with nl HS and soft systolic murmur LSB but no audible rub or gallop     ABDOMEN:distended and tympanic but nontender and soft    BACK:benign    EXTREMS:strong peripheral pulses, soft calves    NEURO:CN intact; speech fluent; asks appropriate questions and answers well; oriented to place, month, and year; affect improved--smiles more; moves all extrems    GENITAL:NE  Labs   Recent Results (from the past 48 hour(s))   Bilirubin, Total 1.1 (H) 0.2 - 1.0 mg/dL    GOT/AST 46 (H) <=37 Units/L    GPT/ALT 25 <64 Units/L    Alkaline Phosphatase 182 (H) 45 - 117 Units/L    Albumin 2.1 (L) 3.6 - 5.1 g/dL    Protein, Total 5.4 (L) 6.4 - 8.2 g/dL    Globulin 3.3 2.0 - 4.0 g/dL    A/G Ratio 0.6 (L) 1.0 - 2.4   Magnesium    Imaging  XR ABDOMEN 3 VIEWS   Final Result      No evidence of bowel obstruction or free intraperitoneal gas.      Electronically Signed by: SILVIO BROTHERS MD    Signed on: 1/11/2021 3:51 PM            No results found.    Assessment/Plan  Encephalopathy resolving with ammonia level down to 54 with lactulose and xifaxan and UTI clearing; hyperglycemia acceptable; cardiopulmonary stable with adequate BP and HR; pancytopenia persists--chronic problem; protein-calorie malnutrition due to dietary restriction to prevent  Admission Reconciliation is Completed  Discharge Reconciliation is Completed worsening encephalopathy; transfer to [facility] today pending recommendations by PT.      DVT Prophylaxis  noted    Primary Care Physician  MD Hayden Guido MD  1/14/2021 10:24 AM

## 2021-01-28 ENCOUNTER — INPATIENT (INPATIENT)
Facility: HOSPITAL | Age: 76
LOS: 6 days | Discharge: NURSING, REHAB OR LONG-TERM CARE | DRG: 57 | End: 2021-02-04
Attending: INTERNAL MEDICINE | Admitting: INTERNAL MEDICINE
Payer: MEDICARE

## 2021-01-28 VITALS
RESPIRATION RATE: 20 BRPM | SYSTOLIC BLOOD PRESSURE: 171 MMHG | WEIGHT: 190.7 LBS | HEIGHT: 67 IN | DIASTOLIC BLOOD PRESSURE: 99 MMHG | OXYGEN SATURATION: 97 % | HEART RATE: 70 BPM

## 2021-01-28 DIAGNOSIS — M48.00 SPINAL STENOSIS, SITE UNSPECIFIED: Chronic | ICD-10-CM

## 2021-01-28 DIAGNOSIS — Z29.9 ENCOUNTER FOR PROPHYLACTIC MEASURES, UNSPECIFIED: ICD-10-CM

## 2021-01-28 DIAGNOSIS — G93.40 ENCEPHALOPATHY, UNSPECIFIED: ICD-10-CM

## 2021-01-28 DIAGNOSIS — Z98.49 CATARACT EXTRACTION STATUS, UNSPECIFIED EYE: Chronic | ICD-10-CM

## 2021-01-28 DIAGNOSIS — R41.0 DISORIENTATION, UNSPECIFIED: ICD-10-CM

## 2021-01-28 DIAGNOSIS — I10 ESSENTIAL (PRIMARY) HYPERTENSION: ICD-10-CM

## 2021-01-28 LAB
ALBUMIN SERPL ELPH-MCNC: 3 G/DL — LOW (ref 3.5–5)
ALBUMIN SERPL ELPH-MCNC: 3.3 G/DL — LOW (ref 3.5–5)
ALP SERPL-CCNC: 104 U/L — SIGNIFICANT CHANGE UP (ref 40–120)
ALP SERPL-CCNC: 111 U/L — SIGNIFICANT CHANGE UP (ref 40–120)
ALT FLD-CCNC: 23 U/L DA — SIGNIFICANT CHANGE UP (ref 10–60)
ALT FLD-CCNC: 24 U/L DA — SIGNIFICANT CHANGE UP (ref 10–60)
AMMONIA BLD-MCNC: <10 UMOL/L — LOW (ref 11–32)
AMPHET UR-MCNC: NEGATIVE — SIGNIFICANT CHANGE UP
AMYLASE P1 CFR SERPL: 42 U/L — SIGNIFICANT CHANGE UP (ref 25–115)
ANION GAP SERPL CALC-SCNC: 6 MMOL/L — SIGNIFICANT CHANGE UP (ref 5–17)
ANION GAP SERPL CALC-SCNC: 6 MMOL/L — SIGNIFICANT CHANGE UP (ref 5–17)
APPEARANCE UR: CLEAR — SIGNIFICANT CHANGE UP
APTT BLD: 31.3 SEC — SIGNIFICANT CHANGE UP (ref 27.5–35.5)
AST SERPL-CCNC: 10 U/L — SIGNIFICANT CHANGE UP (ref 10–40)
AST SERPL-CCNC: 11 U/L — SIGNIFICANT CHANGE UP (ref 10–40)
BARBITURATES UR SCN-MCNC: NEGATIVE — SIGNIFICANT CHANGE UP
BASOPHILS # BLD AUTO: 0.04 K/UL — SIGNIFICANT CHANGE UP (ref 0–0.2)
BASOPHILS # BLD AUTO: 0.04 K/UL — SIGNIFICANT CHANGE UP (ref 0–0.2)
BASOPHILS NFR BLD AUTO: 0.5 % — SIGNIFICANT CHANGE UP (ref 0–2)
BASOPHILS NFR BLD AUTO: 0.5 % — SIGNIFICANT CHANGE UP (ref 0–2)
BENZODIAZ UR-MCNC: NEGATIVE — SIGNIFICANT CHANGE UP
BILIRUB SERPL-MCNC: 1.2 MG/DL — SIGNIFICANT CHANGE UP (ref 0.2–1.2)
BILIRUB SERPL-MCNC: 1.4 MG/DL — HIGH (ref 0.2–1.2)
BILIRUB UR-MCNC: NEGATIVE — SIGNIFICANT CHANGE UP
BUN SERPL-MCNC: 12 MG/DL — SIGNIFICANT CHANGE UP (ref 7–18)
BUN SERPL-MCNC: 9 MG/DL — SIGNIFICANT CHANGE UP (ref 7–18)
CALCIUM SERPL-MCNC: 8.3 MG/DL — LOW (ref 8.4–10.5)
CALCIUM SERPL-MCNC: 8.7 MG/DL — SIGNIFICANT CHANGE UP (ref 8.4–10.5)
CHLORIDE SERPL-SCNC: 104 MMOL/L — SIGNIFICANT CHANGE UP (ref 96–108)
CHLORIDE SERPL-SCNC: 107 MMOL/L — SIGNIFICANT CHANGE UP (ref 96–108)
CHOLEST SERPL-MCNC: 160 MG/DL — SIGNIFICANT CHANGE UP
CO2 SERPL-SCNC: 27 MMOL/L — SIGNIFICANT CHANGE UP (ref 22–31)
CO2 SERPL-SCNC: 27 MMOL/L — SIGNIFICANT CHANGE UP (ref 22–31)
COCAINE METAB.OTHER UR-MCNC: NEGATIVE — SIGNIFICANT CHANGE UP
COLOR SPEC: YELLOW — SIGNIFICANT CHANGE UP
CREAT SERPL-MCNC: 0.88 MG/DL — SIGNIFICANT CHANGE UP (ref 0.5–1.3)
CREAT SERPL-MCNC: 0.92 MG/DL — SIGNIFICANT CHANGE UP (ref 0.5–1.3)
DIFF PNL FLD: NEGATIVE — SIGNIFICANT CHANGE UP
EOSINOPHIL # BLD AUTO: 0.15 K/UL — SIGNIFICANT CHANGE UP (ref 0–0.5)
EOSINOPHIL # BLD AUTO: 0.16 K/UL — SIGNIFICANT CHANGE UP (ref 0–0.5)
EOSINOPHIL NFR BLD AUTO: 1.8 % — SIGNIFICANT CHANGE UP (ref 0–6)
EOSINOPHIL NFR BLD AUTO: 2.1 % — SIGNIFICANT CHANGE UP (ref 0–6)
ERYTHROCYTE [SEDIMENTATION RATE] IN BLOOD: 4 MM/HR — SIGNIFICANT CHANGE UP (ref 0–20)
ETHANOL SERPL-MCNC: <3 MG/DL — SIGNIFICANT CHANGE UP (ref 0–10)
GLUCOSE SERPL-MCNC: 150 MG/DL — HIGH (ref 70–99)
GLUCOSE SERPL-MCNC: 85 MG/DL — SIGNIFICANT CHANGE UP (ref 70–99)
GLUCOSE UR QL: NEGATIVE — SIGNIFICANT CHANGE UP
HCT VFR BLD CALC: 40.9 % — SIGNIFICANT CHANGE UP (ref 39–50)
HCT VFR BLD CALC: 41.6 % — SIGNIFICANT CHANGE UP (ref 39–50)
HDLC SERPL-MCNC: 39 MG/DL — LOW
HGB BLD-MCNC: 13 G/DL — SIGNIFICANT CHANGE UP (ref 13–17)
HGB BLD-MCNC: 13 G/DL — SIGNIFICANT CHANGE UP (ref 13–17)
IMM GRANULOCYTES NFR BLD AUTO: 0.2 % — SIGNIFICANT CHANGE UP (ref 0–1.5)
IMM GRANULOCYTES NFR BLD AUTO: 0.5 % — SIGNIFICANT CHANGE UP (ref 0–1.5)
INR BLD: 1.14 RATIO — SIGNIFICANT CHANGE UP (ref 0.88–1.16)
IRON SATN MFR SERPL: 18 % — LOW (ref 20–55)
IRON SATN MFR SERPL: 51 UG/DL — LOW (ref 65–170)
KETONES UR-MCNC: NEGATIVE — SIGNIFICANT CHANGE UP
LDH SERPL L TO P-CCNC: 140 U/L — SIGNIFICANT CHANGE UP (ref 120–225)
LEUKOCYTE ESTERASE UR-ACNC: NEGATIVE — SIGNIFICANT CHANGE UP
LIPID PNL WITH DIRECT LDL SERPL: 105 MG/DL — HIGH
LYMPHOCYTES # BLD AUTO: 1.51 K/UL — SIGNIFICANT CHANGE UP (ref 1–3.3)
LYMPHOCYTES # BLD AUTO: 1.56 K/UL — SIGNIFICANT CHANGE UP (ref 1–3.3)
LYMPHOCYTES # BLD AUTO: 18.1 % — SIGNIFICANT CHANGE UP (ref 13–44)
LYMPHOCYTES # BLD AUTO: 20.5 % — SIGNIFICANT CHANGE UP (ref 13–44)
MAGNESIUM SERPL-MCNC: 2 MG/DL — SIGNIFICANT CHANGE UP (ref 1.6–2.6)
MAGNESIUM SERPL-MCNC: 2.1 MG/DL — SIGNIFICANT CHANGE UP (ref 1.6–2.6)
MCHC RBC-ENTMCNC: 22.2 PG — LOW (ref 27–34)
MCHC RBC-ENTMCNC: 22.6 PG — LOW (ref 27–34)
MCHC RBC-ENTMCNC: 31.3 GM/DL — LOW (ref 32–36)
MCHC RBC-ENTMCNC: 31.8 GM/DL — LOW (ref 32–36)
MCV RBC AUTO: 71 FL — LOW (ref 80–100)
MCV RBC AUTO: 71.1 FL — LOW (ref 80–100)
METHADONE UR-MCNC: NEGATIVE — SIGNIFICANT CHANGE UP
MONOCYTES # BLD AUTO: 0.61 K/UL — SIGNIFICANT CHANGE UP (ref 0–0.9)
MONOCYTES # BLD AUTO: 0.68 K/UL — SIGNIFICANT CHANGE UP (ref 0–0.9)
MONOCYTES NFR BLD AUTO: 8 % — SIGNIFICANT CHANGE UP (ref 2–14)
MONOCYTES NFR BLD AUTO: 8.2 % — SIGNIFICANT CHANGE UP (ref 2–14)
NEUTROPHILS # BLD AUTO: 5.19 K/UL — SIGNIFICANT CHANGE UP (ref 1.8–7.4)
NEUTROPHILS # BLD AUTO: 5.94 K/UL — SIGNIFICANT CHANGE UP (ref 1.8–7.4)
NEUTROPHILS NFR BLD AUTO: 68.4 % — SIGNIFICANT CHANGE UP (ref 43–77)
NEUTROPHILS NFR BLD AUTO: 71.2 % — SIGNIFICANT CHANGE UP (ref 43–77)
NITRITE UR-MCNC: NEGATIVE — SIGNIFICANT CHANGE UP
NON HDL CHOLESTEROL: 121 MG/DL — SIGNIFICANT CHANGE UP
NRBC # BLD: 0 /100 WBCS — SIGNIFICANT CHANGE UP (ref 0–0)
NRBC # BLD: 0 /100 WBCS — SIGNIFICANT CHANGE UP (ref 0–0)
NT-PROBNP SERPL-SCNC: 202 PG/ML — SIGNIFICANT CHANGE UP (ref 0–450)
OPIATES UR-MCNC: NEGATIVE — SIGNIFICANT CHANGE UP
PCP SPEC-MCNC: SIGNIFICANT CHANGE UP
PCP UR-MCNC: NEGATIVE — SIGNIFICANT CHANGE UP
PH UR: 8 — SIGNIFICANT CHANGE UP (ref 5–8)
PHOSPHATE SERPL-MCNC: 2.8 MG/DL — SIGNIFICANT CHANGE UP (ref 2.5–4.5)
PLATELET # BLD AUTO: 122 K/UL — LOW (ref 150–400)
PLATELET # BLD AUTO: 125 K/UL — LOW (ref 150–400)
POTASSIUM SERPL-MCNC: 3.8 MMOL/L — SIGNIFICANT CHANGE UP (ref 3.5–5.3)
POTASSIUM SERPL-MCNC: 4.1 MMOL/L — SIGNIFICANT CHANGE UP (ref 3.5–5.3)
POTASSIUM SERPL-SCNC: 3.8 MMOL/L — SIGNIFICANT CHANGE UP (ref 3.5–5.3)
POTASSIUM SERPL-SCNC: 4.1 MMOL/L — SIGNIFICANT CHANGE UP (ref 3.5–5.3)
PROT SERPL-MCNC: 6.5 G/DL — SIGNIFICANT CHANGE UP (ref 6–8.3)
PROT SERPL-MCNC: 7.1 G/DL — SIGNIFICANT CHANGE UP (ref 6–8.3)
PROT UR-MCNC: NEGATIVE — SIGNIFICANT CHANGE UP
PROTHROM AB SERPL-ACNC: 13.5 SEC — SIGNIFICANT CHANGE UP (ref 10.6–13.6)
RBC # BLD: 5.76 M/UL — SIGNIFICANT CHANGE UP (ref 4.2–5.8)
RBC # BLD: 5.76 M/UL — SIGNIFICANT CHANGE UP (ref 4.2–5.8)
RBC # BLD: 5.85 M/UL — HIGH (ref 4.2–5.8)
RBC # FLD: 15.1 % — HIGH (ref 10.3–14.5)
RBC # FLD: 15.4 % — HIGH (ref 10.3–14.5)
RETICS #: 75.5 K/UL — SIGNIFICANT CHANGE UP (ref 25–125)
RETICS/RBC NFR: 1.3 % — SIGNIFICANT CHANGE UP (ref 0.5–2.5)
SARS-COV-2 RNA SPEC QL NAA+PROBE: SIGNIFICANT CHANGE UP
SODIUM SERPL-SCNC: 137 MMOL/L — SIGNIFICANT CHANGE UP (ref 135–145)
SODIUM SERPL-SCNC: 140 MMOL/L — SIGNIFICANT CHANGE UP (ref 135–145)
SP GR SPEC: 1.01 — SIGNIFICANT CHANGE UP (ref 1.01–1.02)
T4 AB SER-ACNC: 8.4 UG/DL — SIGNIFICANT CHANGE UP (ref 4.6–12)
THC UR QL: NEGATIVE — SIGNIFICANT CHANGE UP
TIBC SERPL-MCNC: 277 UG/DL — SIGNIFICANT CHANGE UP (ref 250–450)
TRIGL SERPL-MCNC: 78 MG/DL — SIGNIFICANT CHANGE UP
TROPONIN I SERPL-MCNC: <0.015 NG/ML — SIGNIFICANT CHANGE UP (ref 0–0.04)
TROPONIN I SERPL-MCNC: <0.015 NG/ML — SIGNIFICANT CHANGE UP (ref 0–0.04)
TSH SERPL-MCNC: 1.9 UU/ML — SIGNIFICANT CHANGE UP (ref 0.34–4.82)
UIBC SERPL-MCNC: 226 UG/DL — SIGNIFICANT CHANGE UP (ref 110–370)
URATE SERPL-MCNC: 3.8 MG/DL — SIGNIFICANT CHANGE UP (ref 3.4–8.8)
UROBILINOGEN FLD QL: NEGATIVE — SIGNIFICANT CHANGE UP
WBC # BLD: 7.6 K/UL — SIGNIFICANT CHANGE UP (ref 3.8–10.5)
WBC # BLD: 8.34 K/UL — SIGNIFICANT CHANGE UP (ref 3.8–10.5)
WBC # FLD AUTO: 7.6 K/UL — SIGNIFICANT CHANGE UP (ref 3.8–10.5)
WBC # FLD AUTO: 8.34 K/UL — SIGNIFICANT CHANGE UP (ref 3.8–10.5)

## 2021-01-28 PROCEDURE — 70450 CT HEAD/BRAIN W/O DYE: CPT | Mod: 26

## 2021-01-28 PROCEDURE — 71045 X-RAY EXAM CHEST 1 VIEW: CPT | Mod: 26

## 2021-01-28 PROCEDURE — 99285 EMERGENCY DEPT VISIT HI MDM: CPT

## 2021-01-28 RX ORDER — SODIUM CHLORIDE 9 MG/ML
1000 INJECTION INTRAMUSCULAR; INTRAVENOUS; SUBCUTANEOUS
Refills: 0 | Status: DISCONTINUED | OUTPATIENT
Start: 2021-01-28 | End: 2021-02-04

## 2021-01-28 RX ORDER — METOPROLOL TARTRATE 50 MG
0 TABLET ORAL
Qty: 0 | Refills: 3 | DISCHARGE

## 2021-01-28 RX ORDER — FINASTERIDE 5 MG/1
5 TABLET, FILM COATED ORAL DAILY
Refills: 0 | Status: DISCONTINUED | OUTPATIENT
Start: 2021-01-28 | End: 2021-02-04

## 2021-01-28 RX ORDER — LABETALOL HCL 100 MG
10 TABLET ORAL ONCE
Refills: 0 | Status: COMPLETED | OUTPATIENT
Start: 2021-01-28 | End: 2021-01-28

## 2021-01-28 RX ORDER — VORTIOXETINE 5 MG/1
0 TABLET, FILM COATED ORAL
Qty: 0 | Refills: 0 | DISCHARGE

## 2021-01-28 RX ORDER — TAMSULOSIN HYDROCHLORIDE 0.4 MG/1
0.4 CAPSULE ORAL AT BEDTIME
Refills: 0 | Status: DISCONTINUED | OUTPATIENT
Start: 2021-01-28 | End: 2021-02-04

## 2021-01-28 RX ORDER — SERTRALINE 25 MG/1
1 TABLET, FILM COATED ORAL
Qty: 0 | Refills: 0 | DISCHARGE

## 2021-01-28 RX ORDER — FINASTERIDE 5 MG/1
0 TABLET, FILM COATED ORAL
Qty: 0 | Refills: 0 | DISCHARGE

## 2021-01-28 RX ORDER — HYDRALAZINE HCL 50 MG
5 TABLET ORAL ONCE
Refills: 0 | Status: COMPLETED | OUTPATIENT
Start: 2021-01-28 | End: 2021-01-28

## 2021-01-28 RX ORDER — TAMSULOSIN HYDROCHLORIDE 0.4 MG/1
0 CAPSULE ORAL
Qty: 0 | Refills: 2 | DISCHARGE

## 2021-01-28 RX ORDER — METOPROLOL TARTRATE 50 MG
25 TABLET ORAL
Refills: 0 | Status: DISCONTINUED | OUTPATIENT
Start: 2021-01-28 | End: 2021-01-29

## 2021-01-28 RX ADMIN — SODIUM CHLORIDE 70 MILLILITER(S): 9 INJECTION INTRAMUSCULAR; INTRAVENOUS; SUBCUTANEOUS at 22:12

## 2021-01-28 RX ADMIN — Medication 5 MILLIGRAM(S): at 22:09

## 2021-01-28 RX ADMIN — TAMSULOSIN HYDROCHLORIDE 0.4 MILLIGRAM(S): 0.4 CAPSULE ORAL at 22:09

## 2021-01-28 NOTE — H&P ADULT - PROBLEM SELECTOR PLAN 1
Pt p/w alterred mental status ,   on exam pt more oriented  D/D INCLUDE DEPRESSION VS infectious vs metabolic vs toxic vs dementia causes  f/u urine toxicology,   -RPR, ESR, CRP  -VITAMIN B12 ,FOLATE,  Neurology Dr. CONTRERAS

## 2021-01-28 NOTE — PATIENT PROFILE ADULT - NSPROPOAPRESSUREINJURY_GEN_A_NUR
Implemented All Fall Risk Interventions:  [place] to call system. Call bell, personal items and telephone within reach. Instruct patient to call for assistance. Room bathroom lighting operational. Non-slip footwear when patient is off stretcher. Physically safe environment: no spills, clutter or unnecessary equipment. Stretcher in lowest position, wheels locked, appropriate side rails in place. Provide visual cue, wrist band, yellow gown, etc. Monitor gait and stability. Monitor for mental status changes and reorient to person, place, and time. Review medications for side effects contributing to fall risk. Reinforce activity limits and safety measures with patient and family.
no

## 2021-01-28 NOTE — PATIENT PROFILE ADULT - NSTRANSFERBELONGINGSDISPO_GEN_A_NUR
[Colposcopy] : colposcopy [Abnormal Pap] : abnormal pap smear [Patient] : patient [Risks] : risks [Benefits] : benefits [Alternatives] : alternatives [Infection] : infection [Bleeding] : bleeding [Allergic Reaction] : allergic reaction [Consent Obtained] : written consent was obtained prior to the procedure [Punctation ___ o'clock] : no punctation [Acetowhite ___ o'clock] : ascetowhite changes at [unfilled] ~Uo'clock [Atypical Vessels ___ o'clock] : no atypical vessels [No Abnormalities] : no abnormalities seen [Biopsy] : the lesion was not biopsied [Biopsies Taken: # ___] : [unfilled] biopsies taken of the cervix [Biopsy Locations ___ o'clock] : the biopsies were taken at [unfilled] o'clock [ECC Done] : Endocervical curettage was performed.  [Hansel's] : Hansel's solution [Tolerated Well] : the patient tolerated the procedure well [No Complications] : there were no complications with patient

## 2021-01-28 NOTE — ED PROVIDER NOTE - CLINICAL SUMMARY MEDICAL DECISION MAKING FREE TEXT BOX
Character low suspicion for CVA and no focal or localizing findings. NIH 0. No arrhythmia or e/o aortic outflow obstruction, anemia or bleeding, CP/SOB or e/o ACS or PE, or specific infectious findings. Character low suspicion for CVA and no focal or localizing findings. NIH 0. No arrhythmia or e/o aortic outflow obstruction, anemia or bleeding, CP/SOB or e/o ACS or PE, or specific infectious findings including meningeal signs. Awaiting UA. Patient well appearing, hemodynamically stable, calm, no e/o psychosis at this time. Admitted to internal medicine for further monitoring, w/u, and care.

## 2021-01-28 NOTE — ED ADULT TRIAGE NOTE - HEART RATE (BEATS/MIN)
"              After Visit Summary   6/6/2018    Hi Kinney    MRN: [id number]           Patient Information     Date Of Birth          1995        Visit Information        Provider Department      6/6/2018 10:15 AM Anjali Chakraborty PA [facility]        Today's Diagnoses     Microscopic hematuria    -  1    Urinary problem          Care Instructions    Microscopic hematuria with right flank pain  Urinalysis negative for infection - did show microscopic blood  Suspect stone has passed or will pass with history  Drink 2-3 liters of fluid per day  Ibuprofen 800 mg every 6-8 hours, max 2400 mg/day. Take with food.   Tylenol 1000 gm every 4-6 hours, max 4000 mg/day  Strain urine  Follow up with PCP as needed  Declines referral to urology at this time  Seek immediate care    Pain that is not controlled by the medicine given    Repeated vomiting or unable to keep down fluids    Weakness, dizziness or fainting    Fever over 101  F (38.3  C)    Passage of solid red or brown urine (can't see through it) or urine with lots of blood clots    Unable to pass urine for 8 hours and increasing bladder pressure     * KIDNEY STONE (w/ Colic)    The sharp cramping pain and nausea/vomiting that you have is due to a small stone which has formed in the kidney and is now passing down a narrow tube (ureter) on its way to your bladder. Once it reaches your bladder, the pain will stop. The stone may pass in your urine stream in one piece. [The size may be 1/16\" to 1/4\" (1-6mm)]. Or, the stone may also break up into amandeep fragments which you may not even notice.  Once you have had a kidney stone there is a risk for recurrence in the future.  HOME CARE:      Drink lots of fluid (at least 8-10 glasses of water a day).    Most stones will pass on their own, but may take from a few hours to a few days.    Each time you urinate, do so in a jar. Pour the urine from the jar through the strainer and into the toilet. Continue " doing this until 24 hours after your pain stops. By then, if there was a kidney stone, it should pass from your bladder. Some stones dissolve into sand-like particles and pass right through the strainer. In that case, you won't ever see a stone.    Save any stone that you find in the strainer and bring it to your doctor for analysis. It may be possible to prevent certain types of stones from forming. Therefore, it is important to know what kind of stone you have.    Try to stay as active as possible since this will help the stone pass. Do not stay in bed unless your pain prevents you from getting up. You may notice a red, pink or brown color to your urine. This is normal while passing a kidney stone.  FOLLOW UP with your doctor or return to this facility if the pain lasts more than 48 hours.  GET PROMPT MEDICAL ATTENTION if any of the following occur:    Pain that is not controlled by the medicine given    Repeated vomiting or unable to keep down fluids    Weakness, dizziness or fainting    Fever over 101  F (38.3  C)    Passage of solid red or brown urine (can't see through it) or urine with lots of blood clots    Unable to pass urine for 8 hours and increasing bladder pressure    [id number] [employer]. [address] [place] [address]. All rights reserved. This information is not intended as a substitute for professional medical care. Always follow your healthcare professional's instructions.  This information has been modified by your health care provider with permission from the publisher.            Follow-ups after your visit        Who to contact     If you have questions or need follow up information about today's clinic visit or your schedule please contact [facility] directly at [phone].  Normal or non-critical lab and imaging results will be communicated to you by MyChart, letter or phone within 4 business days after the clinic has received the results. If  "you do not hear from us within 7 days, please contact the clinic through [employer] or phone. If you have a critical or abnormal lab result, we will notify you by phone as soon as possible.  Submit refill requests through [employer] or call your pharmacy and they will forward the refill request to us. Please allow 3 business days for your refill to be completed.          Additional Information About Your Visit        Care EveryWhere ID     This is your Care EveryWhere ID. This could be used by other organizations to access your [place] medical records  [id number]        Your Vitals Were     Pulse Temperature Height Pulse Oximetry BMI (Body Mass Index)       80 97.7  F (36.5  C) 5' 8\" (1.727 m) 99% 35.91 kg/m2        Blood Pressure from Last 3 Encounters:   06/06/18 130/86   01/14/18 150/84   10/07/17 124/84    Weight from Last 3 Encounters:   06/06/18 236 lb 3.2 oz (107.1 kg)   01/14/18 230 lb 6.4 oz (104.5 kg)   10/07/17 236 lb 6.4 oz (107.2 kg)              We Performed the Following     *UA reflex to Microscopic     GC/Chlamydia by PCR - [facility]     Urine Microscopic        Primary Care Provider Office Phone # Fax #    Giuseppe Harvey [phone] [address] [facility] [address]        Equal Access to Services     [facility]: Hadii aad ku hadasho Soomaali, waaxda luqadaha, qaybta kaalmada sarai, keely villanueva . So [facility] [phone].    ATENCIÓN: Si habla español, tiene a encarnacion disposición servicios gratuitos de asistencia lingüística. Sujey al [phone].    We comply with applicable federal civil rights laws and [place] laws. We do not discriminate on the basis of race, color, national origin, age, disability, sex, sexual orientation, or gender identity.            Thank you!     Thank you for choosing [facility]  for your care. Our goal is always to provide you with excellent care. Hearing back from our patients is one way " we can continue to improve our services. Please take a few minutes to complete the written survey that you may receive in the mail after your visit with us. Thank you!             Your Updated Medication List - Protect others around you: Learn how to safely use, store and throw away your medicines at www.disposemymeds.org.      Notice  As of 6/6/2018 11:11 AM    You have not been prescribed any medications.       70

## 2021-01-28 NOTE — H&P ADULT - NSICDXPASTMEDICALHX_GEN_ALL_CORE_FT
END OF SHIFT NOTE:    INTAKE/OUTPUT  10/25 0701 - 10/26 0700  In: 1672.3 [I.V.:1672.3]  Out: 2000 [Urine:2000]  Voiding: YES  Catheter: NO  Drain:              Flatus: Patient does not have flatus present. Stool:  0 occurrences. Characteristics:  Stool Assessment  Stool Appearance: Other (comment)(before surgery; normal per pt)    Emesis: 0 occurrences. Characteristics:        VITAL SIGNS  Patient Vitals for the past 12 hrs:   Temp Pulse Resp BP SpO2   10/26/20 0412 98.1 °F (36.7 °C) (!) 55 18 98/66 96 %   10/25/20 2346 98 °F (36.7 °C) (!) 52 18 (!) 105/56 97 %   10/25/20 1931 98.1 °F (36.7 °C) (!) 52 19 (!) 102/54 99 %       Pain Assessment  Pain Intensity 1: 0 (10/26/20 0225)  Pain Location 1: Abdomen  Pain Intervention(s) 1: Medication (see MAR)  Patient Stated Pain Goal: 0    Ambulating  Yes    Shift report given to oncoming nurse at the bedside.     Ciaran Galindo RN PAST MEDICAL HISTORY:  (Idiopathic) normal pressure hydrocephalus     BPH (benign prostatic hyperplasia)     Depression     H/O hydrocephalus     HTN (hypertension)     Lumbar stenosis s/p sx 10/2017    Paroxysmal atrial fibrillation

## 2021-01-28 NOTE — H&P ADULT - HISTORY OF PRESENT ILLNESS
75yoM with h/o NPH, BPH, HTN, pAF, depression per EMR review, presents with AMS. Per ambulance he was unable to recall his wife's name, and also EMS noted that his BP was high. Pt himself does not know why he was sent here, states his wife wanted him to take a bath and he did not want to. Denies all symptoms including HA, CP, dizziness, SOB, abd pain, v/d, leg pain or swelling. D/w wife, states x2 days now losing his mind, does not recognize her, sees men in the room and other strange things, and has lost interest in TV. Also very depressed x 1-2 months. Patient is 75 year old Male with h/o BPH, HTN,  depression presents with AMS. As Per wife he was unable to recall his wife's name.  Pt himself does not know why he was sent here, states his wife wanted him to take a bath and he did not want to. Denies all symptoms including HA, CP, dizziness, SOB, abd pain, v/d, leg pain or swelling. D/w wife, states x2 days now losing his mind, does not recognize her, sees men in the room and other strange things, and has lost interest in TV. Pt was very depressed for 2-3 months. Pt has been acting vert deattached for last 3 months and do not want to eat and go to his grand kids match.  Patient is 75 year old Male with h/o BPH, HTN, right frontal shunt cath locke hydrocephalus, spinal stablization surgery for stenosis,  depression presents with AMS. As Per wife he was unable to recall his wife's name.  Pt himself does not know why he was sent here, states his wife wanted him to take a bath and he did not want to. Denies all symptoms including HA, CP, dizziness, SOB, abd pain, v/d, leg pain or swelling. D/w wife, states x2 days now losing his mind, does not recognize her, sees men in the room and other strange things, and has lost interest in TV. Pt was very depressed for 2-3 months. Pt has been acting vert deattached for last 3 months and do not want to eat and go to his grand kids match.

## 2021-01-28 NOTE — ED PROVIDER NOTE - PHYSICAL EXAMINATION
Afebrile, hemodynamically stable, saturating well  NAD, well appearing, sitting comfortably in stretcher  Head NCAT  EOMI, anicteric  MMM  No JVD  RRR, nml S1/S2, no m/r/g  Lungs CTAB, no w/r/r  Abd soft, NT, ND, nml BS, no rebound or guarding  AAOx2.5 (name, place, year but not to month), CN's 3-12 intact, motor 5/5 and sens symm in all extrems  DEAN spontaneously, no leg cyanosis or edema  Skin warm, dry, no rashes or hives

## 2021-01-28 NOTE — H&P ADULT - ASSESSMENT
Patient is 75 year old Male with h/o BPH, HTN,  depression presents with AMS. As Per wife he was unable to recall his wife's name.  Pt himself does not know why he was sent here, states his wife wanted him to take a bath and he did not want to. Admitted for delirium

## 2021-01-28 NOTE — ED PROVIDER NOTE - OBJECTIVE STATEMENT
75yoM with h/o NPH, BPH, HTN, pAF, depression per EMR review, presents with AMS. Per ambulance he was unable to recall his wife's name, and also EMS noted that his BP was high. Pt himself does not know why he was sent here, states his wife wanted him to take a bath and he did not want to. Denies all symptoms including HA, CP, dizziness, SOB, abd pain, v/d, leg pain or swelling. No one answers at the number listed in EMR x2 calls. 75yoM with h/o NPH, BPH, HTN, pAF, depression per EMR review, presents with AMS. Per ambulance he was unable to recall his wife's name, and also EMS noted that his BP was high. Pt himself does not know why he was sent here, states his wife wanted him to take a bath and he did not want to. Denies all symptoms including HA, CP, dizziness, SOB, abd pain, v/d, leg pain or swelling. D/w wife, states x2 days now losing his mind, does not recognize her, sees men in the room and other strange things, and has lost interest in TV. Also very depressed x 1-2 months.

## 2021-01-28 NOTE — ED ADULT NURSE NOTE - NSIMPLEMENTINTERV_GEN_ALL_ED
Implemented All Fall Risk Interventions:  [place] to call system. Call bell, personal items and telephone within reach. Instruct patient to call for assistance. Room bathroom lighting operational. Non-slip footwear when patient is off stretcher. Physically safe environment: no spills, clutter or unnecessary equipment. Stretcher in lowest position, wheels locked, appropriate side rails in place. Provide visual cue, wrist band, yellow gown, etc. Monitor gait and stability. Monitor for mental status changes and reorient to person, place, and time. Review medications for side effects contributing to fall risk. Reinforce activity limits and safety measures with patient and family.

## 2021-01-28 NOTE — H&P ADULT - NSHPPHYSICALEXAM_GEN_ALL_CORE
PHYSICAL EXAM:  GENERAL: NAD, speaks in full sentences, no signs of respiratory distress  HEAD:  Atraumatic, Normocephalic  EYES: EOMI, PERRLA, conjunctiva and sclera clear  NECK: Supple, No JVD  CHEST/LUNG: Clear to auscultation bilaterally; No wheeze; No crackles; No accessory muscles used  HEART: Regular rate and rhythm; No murmurs;   ABDOMEN: Soft, Nontender, Nondistended; Bowel sounds present; No guarding  EXTREMITIES:  2+ Peripheral Pulses, No cyanosis or edema  PSYCH: AAOx3  NEUROLOGY: non-focal  SKIN: No rashes or lesions PHYSICAL EXAM:  GENERAL: NAD, speaks in full sentences, no signs of respiratory distress, pt clear about why is her here  HEAD:  Atraumatic, Normocephalic  EYES: EOMI, PERRLA, conjunctiva and sclera clear  NECK: Supple, No JVD  CHEST/LUNG:  No wheeze; No crackles; No accessory muscles used  HEART: Regular rate and rhythm; No murmurs;   ABDOMEN: Soft, Nontender, Nondistended; Bowel sounds present; No guarding  EXTREMITIES:  2+ Peripheral Pulses, No cyanosis or edema  PSYCH: AAOx3, pt report more mental clearity and was confused at home  NEUROLOGY: no-focal deficit  SKIN: No rashes or lesions

## 2021-01-28 NOTE — ED ADULT NURSE NOTE - OBJECTIVE STATEMENT
pt is here for altered mental status. A/Ox2, denied chest pain or sob, "want to check up", denied N/V/D or fever, no distress noted at this time.

## 2021-01-28 NOTE — H&P ADULT - PROBLEM SELECTOR PLAN 2
pT was hallucinating at home as per wife , seeing a man   as per pt he donot remember hallucinating   Pt has hx of depression   d/d lewy body dementia vs depression, vs alzhimer  Psych consult in am

## 2021-01-28 NOTE — H&P ADULT - NSCORESITESY/N_GEN_A_CORE_RD
Initiate Treatment: Silvadene 1% cream BID x 1 week Render In Strict Bullet Format?: No Detail Level: Zone Otc Regimen: Stratamed/strataderm dressing (wait 1 week before using) or BioCorneum gel dressing Yes

## 2021-01-29 ENCOUNTER — TRANSCRIPTION ENCOUNTER (OUTPATIENT)
Age: 76
End: 2021-01-29

## 2021-01-29 DIAGNOSIS — F43.21 ADJUSTMENT DISORDER WITH DEPRESSED MOOD: ICD-10-CM

## 2021-01-29 DIAGNOSIS — F05 DELIRIUM DUE TO KNOWN PHYSIOLOGICAL CONDITION: ICD-10-CM

## 2021-01-29 LAB
A1C WITH ESTIMATED AVERAGE GLUCOSE RESULT: 5.7 % — HIGH (ref 4–5.6)
ALBUMIN SERPL ELPH-MCNC: 3.3 G/DL — LOW (ref 3.5–5)
ALP SERPL-CCNC: 122 U/L — HIGH (ref 40–120)
ALT FLD-CCNC: 25 U/L DA — SIGNIFICANT CHANGE UP (ref 10–60)
ANION GAP SERPL CALC-SCNC: 7 MMOL/L — SIGNIFICANT CHANGE UP (ref 5–17)
AST SERPL-CCNC: 33 U/L — SIGNIFICANT CHANGE UP (ref 10–40)
BASOPHILS # BLD AUTO: 0.04 K/UL — SIGNIFICANT CHANGE UP (ref 0–0.2)
BASOPHILS NFR BLD AUTO: 0.5 % — SIGNIFICANT CHANGE UP (ref 0–2)
BILIRUB SERPL-MCNC: 1.6 MG/DL — HIGH (ref 0.2–1.2)
BUN SERPL-MCNC: 11 MG/DL — SIGNIFICANT CHANGE UP (ref 7–18)
CALCIUM SERPL-MCNC: 8.8 MG/DL — SIGNIFICANT CHANGE UP (ref 8.4–10.5)
CHLORIDE SERPL-SCNC: 105 MMOL/L — SIGNIFICANT CHANGE UP (ref 96–108)
CO2 SERPL-SCNC: 27 MMOL/L — SIGNIFICANT CHANGE UP (ref 22–31)
CREAT SERPL-MCNC: 0.9 MG/DL — SIGNIFICANT CHANGE UP (ref 0.5–1.3)
CULTURE RESULTS: NO GROWTH — SIGNIFICANT CHANGE UP
EOSINOPHIL # BLD AUTO: 0.2 K/UL — SIGNIFICANT CHANGE UP (ref 0–0.5)
EOSINOPHIL NFR BLD AUTO: 2.4 % — SIGNIFICANT CHANGE UP (ref 0–6)
ESTIMATED AVERAGE GLUCOSE: 117 MG/DL — HIGH (ref 68–114)
FERRITIN SERPL-MCNC: 338 NG/ML — SIGNIFICANT CHANGE UP (ref 30–400)
FOLATE SERPL-MCNC: 14.6 NG/ML — SIGNIFICANT CHANGE UP
GLUCOSE SERPL-MCNC: 82 MG/DL — SIGNIFICANT CHANGE UP (ref 70–99)
HCT VFR BLD CALC: 44.6 % — SIGNIFICANT CHANGE UP (ref 39–50)
HGB BLD-MCNC: 13.7 G/DL — SIGNIFICANT CHANGE UP (ref 13–17)
IMM GRANULOCYTES NFR BLD AUTO: 0.4 % — SIGNIFICANT CHANGE UP (ref 0–1.5)
LYMPHOCYTES # BLD AUTO: 2.04 K/UL — SIGNIFICANT CHANGE UP (ref 1–3.3)
LYMPHOCYTES # BLD AUTO: 24.8 % — SIGNIFICANT CHANGE UP (ref 13–44)
MAGNESIUM SERPL-MCNC: 2.1 MG/DL — SIGNIFICANT CHANGE UP (ref 1.6–2.6)
MCHC RBC-ENTMCNC: 22.1 PG — LOW (ref 27–34)
MCHC RBC-ENTMCNC: 30.7 GM/DL — LOW (ref 32–36)
MCV RBC AUTO: 71.9 FL — LOW (ref 80–100)
MONOCYTES # BLD AUTO: 0.93 K/UL — HIGH (ref 0–0.9)
MONOCYTES NFR BLD AUTO: 11.3 % — SIGNIFICANT CHANGE UP (ref 2–14)
NEUTROPHILS # BLD AUTO: 4.97 K/UL — SIGNIFICANT CHANGE UP (ref 1.8–7.4)
NEUTROPHILS NFR BLD AUTO: 60.6 % — SIGNIFICANT CHANGE UP (ref 43–77)
NRBC # BLD: 0 /100 WBCS — SIGNIFICANT CHANGE UP (ref 0–0)
PHOSPHATE SERPL-MCNC: 3.1 MG/DL — SIGNIFICANT CHANGE UP (ref 2.5–4.5)
PLATELET # BLD AUTO: 168 K/UL — SIGNIFICANT CHANGE UP (ref 150–400)
POTASSIUM SERPL-MCNC: 4.2 MMOL/L — SIGNIFICANT CHANGE UP (ref 3.5–5.3)
POTASSIUM SERPL-SCNC: 4.2 MMOL/L — SIGNIFICANT CHANGE UP (ref 3.5–5.3)
PROT SERPL-MCNC: 7.7 G/DL — SIGNIFICANT CHANGE UP (ref 6–8.3)
RBC # BLD: 6.2 M/UL — HIGH (ref 4.2–5.8)
RBC # FLD: 15.9 % — HIGH (ref 10.3–14.5)
SARS-COV-2 IGG SERPL QL IA: NEGATIVE — SIGNIFICANT CHANGE UP
SARS-COV-2 IGM SERPL IA-ACNC: 0.07 INDEX — SIGNIFICANT CHANGE UP
SODIUM SERPL-SCNC: 139 MMOL/L — SIGNIFICANT CHANGE UP (ref 135–145)
SPECIMEN SOURCE: SIGNIFICANT CHANGE UP
VIT B12 SERPL-MCNC: 499 PG/ML — SIGNIFICANT CHANGE UP (ref 232–1245)
WBC # BLD: 8.21 K/UL — SIGNIFICANT CHANGE UP (ref 3.8–10.5)
WBC # FLD AUTO: 8.21 K/UL — SIGNIFICANT CHANGE UP (ref 3.8–10.5)

## 2021-01-29 PROCEDURE — 90792 PSYCH DIAG EVAL W/MED SRVCS: CPT

## 2021-01-29 PROCEDURE — 99223 1ST HOSP IP/OBS HIGH 75: CPT

## 2021-01-29 RX ORDER — ENOXAPARIN SODIUM 100 MG/ML
40 INJECTION SUBCUTANEOUS DAILY
Refills: 0 | Status: DISCONTINUED | OUTPATIENT
Start: 2021-01-29 | End: 2021-02-04

## 2021-01-29 RX ORDER — METOPROLOL TARTRATE 50 MG
50 TABLET ORAL
Refills: 0 | Status: DISCONTINUED | OUTPATIENT
Start: 2021-01-29 | End: 2021-02-04

## 2021-01-29 RX ORDER — ESCITALOPRAM OXALATE 10 MG/1
5 TABLET, FILM COATED ORAL DAILY
Refills: 0 | Status: DISCONTINUED | OUTPATIENT
Start: 2021-01-29 | End: 2021-02-04

## 2021-01-29 RX ORDER — HALOPERIDOL DECANOATE 100 MG/ML
2 INJECTION INTRAMUSCULAR ONCE
Refills: 0 | Status: COMPLETED | OUTPATIENT
Start: 2021-01-29 | End: 2021-01-29

## 2021-01-29 RX ORDER — HYDRALAZINE HCL 50 MG
5 TABLET ORAL ONCE
Refills: 0 | Status: DISCONTINUED | OUTPATIENT
Start: 2021-01-29 | End: 2021-01-29

## 2021-01-29 RX ADMIN — Medication 40 MILLIGRAM(S): at 06:03

## 2021-01-29 RX ADMIN — ENOXAPARIN SODIUM 40 MILLIGRAM(S): 100 INJECTION SUBCUTANEOUS at 16:53

## 2021-01-29 RX ADMIN — ESCITALOPRAM OXALATE 5 MILLIGRAM(S): 10 TABLET, FILM COATED ORAL at 12:36

## 2021-01-29 RX ADMIN — HALOPERIDOL DECANOATE 2 MILLIGRAM(S): 100 INJECTION INTRAMUSCULAR at 02:35

## 2021-01-29 RX ADMIN — Medication 25 MILLIGRAM(S): at 06:03

## 2021-01-29 RX ADMIN — Medication 50 MILLIGRAM(S): at 16:52

## 2021-01-29 RX ADMIN — FINASTERIDE 5 MILLIGRAM(S): 5 TABLET, FILM COATED ORAL at 16:53

## 2021-01-29 RX ADMIN — Medication 1.25 MILLIGRAM(S): at 21:33

## 2021-01-29 RX ADMIN — TAMSULOSIN HYDROCHLORIDE 0.4 MILLIGRAM(S): 0.4 CAPSULE ORAL at 21:02

## 2021-01-29 NOTE — CONSULT NOTE ADULT - SUBJECTIVE AND OBJECTIVE BOX
++++++++++++++++++++NOTE NOT COMPLETED+++++++++++++++++++++++++++++ ++++++++++++++++++++NOTE NOT COMPLETED+++++++++++++++++++++++++++++    Patient is a 75y old  Male who presents with a chief complaint of altered mental status (2021 13:35)      HPI:  Patient is 75 year old Male with h/o BPH, HTN, right frontal shunt cath locke hydrocephalus, spinal stablization surgery for stenosis,  depression presents with AMS. As Per wife he was unable to recall his wife's name.  Pt himself does not know why he was sent here, states his wife wanted him to take a bath and he did not want to. Denies all symptoms including HA, CP, dizziness, SOB, abd pain, v/d, leg pain or swelling. D/w wife, states x2 days now losing his mind, does not recognize her, sees men in the room and other strange things, and has lost interest in TV. Pt was very depressed for 2-3 months. Pt has been acting vert deattached for last 3 months and do not want to eat and go to his grand kids match.  (2021 16:28)    Pt unable to provide specific history.  Started reporting he was on the bus and fell down and hit his head.  Unable to state where he was traveling.  Began reporting "young people students they happy and they enjoy themselves.  "It's important young people don't get spoiled."  Unable to provide history and reason for hospital presentation.       Neurological Review of Systems:  No difficulty with language.  No vision loss or double vision.  No dizziness, vertigo or new hearing loss.  No difficulty with speech or swallowing.  No focal weakness.  No focal sensory changes.  No numbness or tingling in the bilateral lower extremities.  (+) Difficulty with balance.  (+) Difficulty with ambulation.        MEDICATIONS  (STANDING):  enalapril 40 milliGRAM(s) Oral daily  escitalopram 5 milliGRAM(s) Oral daily  finasteride 5 milliGRAM(s) Oral daily  metoprolol tartrate 50 milliGRAM(s) Oral two times a day  sodium chloride 0.9%. 1000 milliLiter(s) (70 mL/Hr) IV Continuous <Continuous>  tamsulosin 0.4 milliGRAM(s) Oral at bedtime    MEDICATIONS  (PRN):    Allergies    No Known Allergies    Intolerances      PAST MEDICAL & SURGICAL HISTORY:  Depression    Paroxysmal atrial fibrillation    H/O hydrocephalus    (Idiopathic) normal pressure hydrocephalus    Lumbar stenosis  s/p sx 10/2017    BPH (benign prostatic hyperplasia)    HTN (hypertension)    S/P cataract surgery    SS (spinal stenosis)  lumbar vetebral fusion L3- L4, L4-L5, L5-S1      FAMILY HISTORY:  FH: HTN (hypertension)  parents      SOCIAL HISTORY: non smoker    Review of Systems:  Constitutional: No generalized weakness. No fevers or chills                Eyes, Ears, Mouth, Throat: No vision loss   Respiratory: No shortness of breath or cough                              Cardiovascular: No chest pain or palpitations  Gastrointestinal: No nausea or vomiting                                         Genitourinary: No urinary incontinence or burning on urination  Musculoskeletal: No joint pain                                                         Dermatologic: No rash  Neurological: as per HPI                                                                      Psychiatric: No behavioral problems  Endocrine: No known hypoglycemia              Hematologic/Lymphatic: No easy bleeding    O:  Vital Signs Last 24 Hrs  T(C): 36.5 (2021 07:30), Max: 36.6 (2021 22:12)  T(F): 97.7 (2021 07:30), Max: 97.9 (2021 01:06)  HR: 70 (2021 10:39) (64 - 71)  BP: 142/105 (2021 10:39) (142/105 - 181/102)  BP(mean): 95 (2021 06:02) (95 - 95)  RR: 20 (2021 10:39) (17 - 20)  SpO2: 96% (2021 10:39) (96% - 100%)    General Exam:   General appearance: No acute distress                 Cardiovascular: Pedal dorsalis pulses intact bilaterally    Mental Status: Oriented to self, date and place.  Attention impaired.  No dysarthria, aphasia or neglect.  Knowledge, registration, short and long term memory impaired.      Cranial Nerves: CN I - not tested.  PERRL, EOMI, VFF, no nystagmus or diplopia.  No APD.  Fundi not visualized.  CN V1-3 intact to light touch.  No facial asymmetry.  Hearing intact to finger rub bilaterally.  Tongue, uvula and palate midline.  Sternocleidomastoid and Trapezius intact bilaterally.    Motor:   Tone: Normal                 Strength weak in all extremities   No pronator drift bilaterally                      No dysmetria on finger-nose-finger.  Unable to follow command for heel-shin-heel  No truncal ataxia.  No resting, postural or action tremor.  No myoclonus.    Sensation: intact to light touch    Deep Tendon Reflexes: 1+ bilateral biceps, triceps, brachioradialis, knee and ankle  Right toe flexor.  Left Babinski.    Gait: Normal and stable.  Romberg (-).    Other:     LABS:                        13.7   8.21  )-----------( 168      ( 2021 07:45 )             44.6         139  |  105  |  11  ----------------------------<  82  4.2   |  27  |  0.90    Ca    8.8      2021 07:45  Phos  3.1       Mg     2.1         TPro  7.7  /  Alb  3.3<L>  /  TBili  1.6<H>  /  DBili  x   /  AST  33  /  ALT  25  /  AlkPhos  122<H>      PT/INR - ( 2021 13:41 )   PT: 13.5 sec;   INR: 1.14 ratio         PTT - ( 2021 13:41 )  PTT:31.3 sec  Urinalysis Basic - ( 2021 21:56 )    Color: Yellow / Appearance: Clear / S.010 / pH: x  Gluc: x / Ketone: Negative  / Bili: Negative / Urobili: Negative   Blood: x / Protein: Negative / Nitrite: Negative   Leuk Esterase: Negative / RBC: x / WBC x   Sq Epi: x / Non Sq Epi: x / Bacteria: x          RADIOLOGY & ADDITIONAL STUDIES:    < from: CT Head No Cont (21 @ 15:15) >    EXAM:  CT BRAIN                            PROCEDURE DATE:  2021          INTERPRETATION:  CLINICAL Indications:  confusion    COMPARISON: CT head dated 7/15/2020.    TECHNIQUE: Noncontrast CT of the head. Multiplanar reformations are submitted.    FINDINGS: Right frontal approach shunt catheter is redemonstrated distal tip adjacent to the septum pellucidum. Stable ventricular size. Stable anterior left middle cranial fossa arachnoid cyst on image 23 of series 2 measures 3.3 x 2.0 cm.  There is periventricular and subcortical white matter hypodensity without mass effect, nonspecific, likely representing moderate chronic microvascular ischemic changes. There is no compelling evidence for an acute transcortical infarction. There is no evidence of mass, mass effect, midline shift or extra-axial fluid collection. The lateral ventricles and cortical sulci are age-appropriate in size and configuration. The patient is status post bilateral ocular lens replacement surgery. Mild inflammatory mucosal changes are seen throughout the various portions of the paranasal sinuses. The orbits and mastoid air cells are otherwise unremarkable. The calvarium is intact. Consider follow-up CT or MRI as clinically warranted.    IMPRESSION:  Moderate chronic microvascular changes without evidence of an acute transcortical infarction or hemorrhage.    ================    CLINICAL INDICATIONS: confusion    COMPARISON: None.    TECHNIQUE: Noncontrast CT of the cervical spine. Multiple contiguous axial images through the cervical spine as well as multiplanar reformatted images are submitted for interpretation without the administration of intravenous contrast. 3-D images.    FINDINGS: Severe loss of disc space height at the C6/C7 level. Multilevelanterior bridging osteophytes. Mild grade anterolisthesis of C5 over C6. Moderate multilevel facet hypertrophy and uncovertebral hypertrophy. Mild levoscoliosis of the mid cervical spine.    There is no evidence for acute fracture. Otherwise, normal lordosis is noted. Craniocervical junction is normal. The cervicovertebral body heights and remaining intervertebral disc spaces are preserved. There is no prevertebral soft tissue abnormality. The odontoid process is intact.    Thyroid gland is unremarkable. The airway is patent. The upper lung apices are unremarkable.    Evaluation of the individual levels:    C2/C3 level: No spinal canal stenosis or foraminal narrowing.    C3/C4 level: Small midline disc herniation. No spinal canal stenosis or foraminal narrowing.    C4/C5 level: No spinal canal stenosis or foraminal narrowing.    C5/C6 level: Broad-based ridging causing mild to moderate bilateral foraminal narrowing. No spinal canal stenosis.    C6/C7 level: Broad-based ridging causing moderate bilateral foraminal narrowing. No spinal canal stenosis.    C7/T1 level: No spinal canal stenosis or foraminal narrowing.      IMPRESSION:    Moderate spondylosis. No acute osseous abnormality. Consider MRI as clinically warranted.            CELESTINO DANIEL MD; Attending Radiologist  This document has been electronically signed. 2021  3:21PM    < end of copied text >     Patient is a 75y old  Male who presents with a chief complaint of altered mental status (2021 13:35)      HPI:  Patient is 75 year old Male with h/o BPH, HTN, right frontal shunt cath locke hydrocephalus, spinal stablization surgery for stenosis,  depression presents with AMS. As Per wife he was unable to recall his wife's name.  Pt himself does not know why he was sent here, states his wife wanted him to take a bath and he did not want to. Denies all symptoms including HA, CP, dizziness, SOB, abd pain, v/d, leg pain or swelling. D/w wife, states x2 days now losing his mind, does not recognize her, sees men in the room and other strange things, and has lost interest in TV. Pt was very depressed for 2-3 months. Pt has been acting vert deattached for last 3 months and do not want to eat and go to his grand kids match.  (2021 16:28)    Pt unable to provide specific history.  Started reporting he was on the bus and fell down and hit his head.  Unable to state where he was traveling.  Began reporting "young people students they happy and they enjoy themselves.  "It's important young people don't get spoiled."  Unable to provide history and reason for hospital presentation.       Neurological Review of Systems:  No difficulty with language.  No vision loss or double vision.  No dizziness, vertigo or new hearing loss.  No difficulty with speech or swallowing.  No focal weakness.  No focal sensory changes.  No numbness or tingling in the bilateral lower extremities.  (+) Difficulty with balance.  (+) Difficulty with ambulation.        MEDICATIONS  (STANDING):  enalapril 40 milliGRAM(s) Oral daily  escitalopram 5 milliGRAM(s) Oral daily  finasteride 5 milliGRAM(s) Oral daily  metoprolol tartrate 50 milliGRAM(s) Oral two times a day  sodium chloride 0.9%. 1000 milliLiter(s) (70 mL/Hr) IV Continuous <Continuous>  tamsulosin 0.4 milliGRAM(s) Oral at bedtime    MEDICATIONS  (PRN):    Allergies    No Known Allergies    Intolerances      PAST MEDICAL & SURGICAL HISTORY:  Depression    Paroxysmal atrial fibrillation    H/O hydrocephalus    (Idiopathic) normal pressure hydrocephalus    Lumbar stenosis  s/p sx 10/2017    BPH (benign prostatic hyperplasia)    HTN (hypertension)    S/P cataract surgery    SS (spinal stenosis)  lumbar vetebral fusion L3- L4, L4-L5, L5-S1      FAMILY HISTORY:  FH: HTN (hypertension)  parents      SOCIAL HISTORY: non smoker    Review of Systems:  Constitutional: No generalized weakness. No fevers or chills                Eyes, Ears, Mouth, Throat: No vision loss   Respiratory: No shortness of breath or cough                              Cardiovascular: No chest pain or palpitations  Gastrointestinal: No nausea or vomiting                                         Genitourinary: No urinary incontinence or burning on urination  Musculoskeletal: No joint pain                                                         Dermatologic: No rash  Neurological: as per HPI                                                                      Psychiatric: No behavioral problems  Endocrine: No known hypoglycemia              Hematologic/Lymphatic: No easy bleeding    O:  Vital Signs Last 24 Hrs  T(C): 36.5 (2021 07:30), Max: 36.6 (2021 22:12)  T(F): 97.7 (2021 07:30), Max: 97.9 (2021 01:06)  HR: 70 (2021 10:39) (64 - 71)  BP: 142/105 (2021 10:39) (142/105 - 181/102)  BP(mean): 95 (2021 06:02) (95 - 95)  RR: 20 (2021 10:39) (17 - 20)  SpO2: 96% (2021 10:39) (96% - 100%)    General Exam:   General appearance: No acute distress                 Cardiovascular: Pedal dorsalis pulses intact bilaterally    Mental Status: Oriented to self and place not date.  Attention impaired.  No dysarthria, aphasia or neglect.  Knowledge, registration, short and long term memory impaired.      Cranial Nerves: CN I - not tested.  PERRL, EOMI, VFF, no nystagmus or diplopia.  No APD.  Fundi not visualized.  CN V1-3 intact to light touch.  No facial asymmetry.  Hearing intact to finger rub bilaterally.  Tongue, uvula and palate midline.  Sternocleidomastoid and Trapezius intact bilaterally.    Motor:   Tone: Normal                 Strength weak in all extremities   No pronator drift bilaterally                      No dysmetria on finger-nose-finger.  Unable to follow command for heel-shin-heel  No truncal ataxia.  No resting, postural or action tremor.  No myoclonus.    Sensation: intact to light touch    Deep Tendon Reflexes: 1+ bilateral biceps, triceps, brachioradialis, knee and ankle  Right toe flexor.  Left Babinski.    Gait: Normal and stable.  Romberg (-).    Other:     LABS:                        13.7   8.21  )-----------( 168      ( 2021 07:45 )             44.6         139  |  105  |  11  ----------------------------<  82  4.2   |  27  |  0.90    Ca    8.8      2021 07:45  Phos  3.1       Mg     2.1         TPro  7.7  /  Alb  3.3<L>  /  TBili  1.6<H>  /  DBili  x   /  AST  33  /  ALT  25  /  AlkPhos  122<H>      PT/INR - ( 2021 13:41 )   PT: 13.5 sec;   INR: 1.14 ratio         PTT - ( 2021 13:41 )  PTT:31.3 sec  Urinalysis Basic - ( 2021 21:56 )    Color: Yellow / Appearance: Clear / S.010 / pH: x  Gluc: x / Ketone: Negative  / Bili: Negative / Urobili: Negative   Blood: x / Protein: Negative / Nitrite: Negative   Leuk Esterase: Negative / RBC: x / WBC x   Sq Epi: x / Non Sq Epi: x / Bacteria: x          RADIOLOGY & ADDITIONAL STUDIES:    < from: CT Head No Cont (21 @ 15:15) >    EXAM:  CT BRAIN                            PROCEDURE DATE:  2021          INTERPRETATION:  CLINICAL Indications:  confusion    COMPARISON: CT head dated 7/15/2020.    TECHNIQUE: Noncontrast CT of the head. Multiplanar reformations are submitted.    FINDINGS: Right frontal approach shunt catheter is redemonstrated distal tip adjacent to the septum pellucidum. Stable ventricular size. Stable anterior left middle cranial fossa arachnoid cyst on image 23 of series 2 measures 3.3 x 2.0 cm.  There is periventricular and subcortical white matter hypodensity without mass effect, nonspecific, likely representing moderate chronic microvascular ischemic changes. There is no compelling evidence for an acute transcortical infarction. There is no evidence of mass, mass effect, midline shift or extra-axial fluid collection. The lateral ventricles and cortical sulci are age-appropriate in size and configuration. The patient is status post bilateral ocular lens replacement surgery. Mild inflammatory mucosal changes are seen throughout the various portions of the paranasal sinuses. The orbits and mastoid air cells are otherwise unremarkable. The calvarium is intact. Consider follow-up CT or MRI as clinically warranted.    IMPRESSION:  Moderate chronic microvascular changes without evidence of an acute transcortical infarction or hemorrhage.    ================    CLINICAL INDICATIONS: confusion    COMPARISON: None.    TECHNIQUE: Noncontrast CT of the cervical spine. Multiple contiguous axial images through the cervical spine as well as multiplanar reformatted images are submitted for interpretation without the administration of intravenous contrast. 3-D images.    FINDINGS: Severe loss of disc space height at the C6/C7 level. Multilevelanterior bridging osteophytes. Mild grade anterolisthesis of C5 over C6. Moderate multilevel facet hypertrophy and uncovertebral hypertrophy. Mild levoscoliosis of the mid cervical spine.    There is no evidence for acute fracture. Otherwise, normal lordosis is noted. Craniocervical junction is normal. The cervicovertebral body heights and remaining intervertebral disc spaces are preserved. There is no prevertebral soft tissue abnormality. The odontoid process is intact.    Thyroid gland is unremarkable. The airway is patent. The upper lung apices are unremarkable.    Evaluation of the individual levels:    C2/C3 level: No spinal canal stenosis or foraminal narrowing.    C3/C4 level: Small midline disc herniation. No spinal canal stenosis or foraminal narrowing.    C4/C5 level: No spinal canal stenosis or foraminal narrowing.    C5/C6 level: Broad-based ridging causing mild to moderate bilateral foraminal narrowing. No spinal canal stenosis.    C6/C7 level: Broad-based ridging causing moderate bilateral foraminal narrowing. No spinal canal stenosis.    C7/T1 level: No spinal canal stenosis or foraminal narrowing.      IMPRESSION:    Moderate spondylosis. No acute osseous abnormality. Consider MRI as clinically warranted.            CELESTINO DANIEL MD; Attending Radiologist  This document has been electronically signed. 2021  3:21PM    < end of copied text >     Patient is a 75y old  Male who presents with a chief complaint of altered mental status (2021 13:35)      HPI:  Patient is 75 year old Male with h/o BPH, HTN, right frontal shunt cath locke hydrocephalus, spinal stablization surgery for stenosis,  depression presents with AMS. As Per wife he was unable to recall his wife's name.  Pt himself does not know why he was sent here, states his wife wanted him to take a bath and he did not want to. Denies all symptoms including HA, CP, dizziness, SOB, abd pain, v/d, leg pain or swelling. D/w wife, states x2 days now losing his mind, does not recognize her, sees men in the room and other strange things, and has lost interest in TV. Pt was very depressed for 2-3 months. Pt has been acting vert deattached for last 3 months and do not want to eat and go to his grand kids match.  (2021 16:28)    Pt unable to provide specific history.  Started reporting he was on the bus and fell down and hit his head.  Unable to state where he was traveling.  Began reporting "young people students they happy and they enjoy themselves.  "It's important young people don't get spoiled."  Unable to provide history and reason for hospital presentation.       Neurological Review of Systems:  No difficulty with language.  No vision loss or double vision.  No dizziness, vertigo or new hearing loss.  No difficulty with speech or swallowing.  No focal weakness.  No focal sensory changes.  No numbness or tingling in the bilateral lower extremities.  (+) Difficulty with balance.  (+) Difficulty with ambulation.        MEDICATIONS  (STANDING):  enalapril 40 milliGRAM(s) Oral daily  escitalopram 5 milliGRAM(s) Oral daily  finasteride 5 milliGRAM(s) Oral daily  metoprolol tartrate 50 milliGRAM(s) Oral two times a day  sodium chloride 0.9%. 1000 milliLiter(s) (70 mL/Hr) IV Continuous <Continuous>  tamsulosin 0.4 milliGRAM(s) Oral at bedtime    MEDICATIONS  (PRN):    Allergies    No Known Allergies    Intolerances      PAST MEDICAL & SURGICAL HISTORY:  Depression    Paroxysmal atrial fibrillation    H/O hydrocephalus    (Idiopathic) normal pressure hydrocephalus    Lumbar stenosis  s/p sx 10/2017    BPH (benign prostatic hyperplasia)    HTN (hypertension)    S/P cataract surgery    SS (spinal stenosis)  lumbar vetebral fusion L3- L4, L4-L5, L5-S1      FAMILY HISTORY:  FH: HTN (hypertension)  parents      SOCIAL HISTORY: non smoker    Review of Systems:  Constitutional: No generalized weakness. No fevers or chills                Eyes, Ears, Mouth, Throat: No vision loss   Respiratory: No shortness of breath or cough                              Cardiovascular: No chest pain or palpitations  Gastrointestinal: No nausea or vomiting                                         Genitourinary: No urinary incontinence or burning on urination  Musculoskeletal: No joint pain                                                         Dermatologic: No rash  Neurological: as per HPI                                                                      Psychiatric: No behavioral problems  Endocrine: No known hypoglycemia              Hematologic/Lymphatic: No easy bleeding    O:  Vital Signs Last 24 Hrs  T(C): 36.5 (2021 07:30), Max: 36.6 (2021 22:12)  T(F): 97.7 (2021 07:30), Max: 97.9 (2021 01:06)  HR: 70 (2021 10:39) (64 - 71)  BP: 142/105 (2021 10:39) (142/105 - 181/102)  BP(mean): 95 (2021 06:02) (95 - 95)  RR: 20 (2021 10:39) (17 - 20)  SpO2: 96% (2021 10:39) (96% - 100%)    General Exam:   General appearance: No acute distress                 Cardiovascular: Pedal dorsalis pulses intact bilaterally    Mental Status: Oriented to self and place not date.  Attention impaired.  No dysarthria, aphasia or neglect.  Knowledge, registration, short and long term memory impaired.      Cranial Nerves: CN I - not tested.  PERRL, EOMI, VFF, no nystagmus or diplopia.  No APD.  Fundi not visualized.  CN V1-3 intact to light touch.  No facial asymmetry.  Hearing intact to finger rub bilaterally.  Tongue, uvula and palate midline.  Sternocleidomastoid and Trapezius intact bilaterally.  SEE ATTENDING ATTESTATION  Motor:   Tone: Normal                 Strength weak in all extremities   No pronator drift bilaterally                      No dysmetria on finger-nose-finger.  Unable to follow command for heel-shin-heel  No truncal ataxia.  No resting, postural or action tremor.  No myoclonus.    Sensation: intact to light touch    Deep Tendon Reflexes: 1+ bilateral biceps, triceps, brachioradialis, knee and ankle  Right toe flexor.  Left Babinski.    Gait: Normal and stable.  Romberg (-).    Other:     LABS:                        13.7   8.21  )-----------( 168      ( 2021 07:45 )             44.6         139  |  105  |  11  ----------------------------<  82  4.2   |  27  |  0.90    Ca    8.8      2021 07:45  Phos  3.1       Mg     2.1         TPro  7.7  /  Alb  3.3<L>  /  TBili  1.6<H>  /  DBili  x   /  AST  33  /  ALT  25  /  AlkPhos  122<H>      PT/INR - ( 2021 13:41 )   PT: 13.5 sec;   INR: 1.14 ratio         PTT - ( 2021 13:41 )  PTT:31.3 sec  Urinalysis Basic - ( 2021 21:56 )    Color: Yellow / Appearance: Clear / S.010 / pH: x  Gluc: x / Ketone: Negative  / Bili: Negative / Urobili: Negative   Blood: x / Protein: Negative / Nitrite: Negative   Leuk Esterase: Negative / RBC: x / WBC x   Sq Epi: x / Non Sq Epi: x / Bacteria: x          RADIOLOGY & ADDITIONAL STUDIES:    < from: CT Head No Cont (21 @ 15:15) >    EXAM:  CT BRAIN                            PROCEDURE DATE:  2021          INTERPRETATION:  CLINICAL Indications:  confusion    COMPARISON: CT head dated 7/15/2020.    TECHNIQUE: Noncontrast CT of the head. Multiplanar reformations are submitted.    FINDINGS: Right frontal approach shunt catheter is redemonstrated distal tip adjacent to the septum pellucidum. Stable ventricular size. Stable anterior left middle cranial fossa arachnoid cyst on image 23 of series 2 measures 3.3 x 2.0 cm.  There is periventricular and subcortical white matter hypodensity without mass effect, nonspecific, likely representing moderate chronic microvascular ischemic changes. There is no compelling evidence for an acute transcortical infarction. There is no evidence of mass, mass effect, midline shift or extra-axial fluid collection. The lateral ventricles and cortical sulci are age-appropriate in size and configuration. The patient is status post bilateral ocular lens replacement surgery. Mild inflammatory mucosal changes are seen throughout the various portions of the paranasal sinuses. The orbits and mastoid air cells are otherwise unremarkable. The calvarium is intact. Consider follow-up CT or MRI as clinically warranted.    IMPRESSION:  Moderate chronic microvascular changes without evidence of an acute transcortical infarction or hemorrhage.    ================    CLINICAL INDICATIONS: confusion    COMPARISON: None.    TECHNIQUE: Noncontrast CT of the cervical spine. Multiple contiguous axial images through the cervical spine as well as multiplanar reformatted images are submitted for interpretation without the administration of intravenous contrast. 3-D images.    FINDINGS: Severe loss of disc space height at the C6/C7 level. Multilevelanterior bridging osteophytes. Mild grade anterolisthesis of C5 over C6. Moderate multilevel facet hypertrophy and uncovertebral hypertrophy. Mild levoscoliosis of the mid cervical spine.    There is no evidence for acute fracture. Otherwise, normal lordosis is noted. Craniocervical junction is normal. The cervicovertebral body heights and remaining intervertebral disc spaces are preserved. There is no prevertebral soft tissue abnormality. The odontoid process is intact.    Thyroid gland is unremarkable. The airway is patent. The upper lung apices are unremarkable.    Evaluation of the individual levels:    C2/C3 level: No spinal canal stenosis or foraminal narrowing.    C3/C4 level: Small midline disc herniation. No spinal canal stenosis or foraminal narrowing.    C4/C5 level: No spinal canal stenosis or foraminal narrowing.    C5/C6 level: Broad-based ridging causing mild to moderate bilateral foraminal narrowing. No spinal canal stenosis.    C6/C7 level: Broad-based ridging causing moderate bilateral foraminal narrowing. No spinal canal stenosis.    C7/T1 level: No spinal canal stenosis or foraminal narrowing.      IMPRESSION:    Moderate spondylosis. No acute osseous abnormality. Consider MRI as clinically warranted.            CELESTINO DANIEL MD; Attending Radiologist  This document has been electronically signed. 2021  3:21PM    < end of copied text >

## 2021-01-29 NOTE — BEHAVIORAL HEALTH ASSESSMENT NOTE - NSBHCHARTREVIEWVS_PSY_A_CORE FT
Vital Signs Last 24 Hrs  T(C): 36.5 (29 Jan 2021 07:30), Max: 36.6 (28 Jan 2021 22:12)  T(F): 97.7 (29 Jan 2021 07:30), Max: 97.9 (29 Jan 2021 01:06)  HR: 70 (29 Jan 2021 10:39) (64 - 71)  BP: 142/105 (29 Jan 2021 10:39) (142/105 - 181/102)  BP(mean): 95 (29 Jan 2021 06:02) (95 - 95)  RR: 20 (29 Jan 2021 10:39) (17 - 20)  SpO2: 96% (29 Jan 2021 10:39) (96% - 100%)

## 2021-01-29 NOTE — BEHAVIORAL HEALTH ASSESSMENT NOTE - NSBHSOCIALHXDETAILSFT_PSY_A_CORE
B/R in [place] to parents of [place] descent. Emigrated to [place] with wife. Has 3 adult children and 5 grandchildren. Formerly operated a coffee cart, but now retired. Wife is a retired dressmaker. Lives with wife in [place].

## 2021-01-29 NOTE — DISCHARGE NOTE PROVIDER - HOSPITAL COURSE
Patient is 75 year old Male with h/o BPH, HTN, right frontal shunt cath locke hydrocephalus, spinal stablization surgery for stenosis,  depression presents with AMS. As Per wife he was unable to recall his wife's name.  Pt himself does not know why he was sent here, states his wife wanted him to take a bath and he did not want to. Denies all symptoms including HA, CP, dizziness, SOB, abd pain, v/d, leg pain or swelling. D/w wife, states x2 days now losing his mind, does not recognize her, sees men in the room and other strange things, and has lost interest in TV. Pt was very depressed for 2-3 months. Pt has been acting very deattached for last 3 months and do not want to eat and go to his grand kids match. patient was evaluated by psychaitry and neurology.      Patient is 75 year old Male with h/o BPH, HTN, right frontal shunt cath locke hydrocephalus, spinal stablization surgery for stenosis,  depression presents with AMS. As Per wife he was unable to recall his wife's name.  Pt himself does not know why he was sent here, states his wife wanted him to take a bath and he did not want to. Denies all symptoms including HA, CP, dizziness, SOB, abd pain, v/d, leg pain or swelling. D/w wife, states x2 days now losing his mind, does not recognize her, sees men in the room and other strange things, and has lost interest in TV. Pt was very depressed for 2-3 months. Pt has been acting very deattached for last 3 months and do not want to eat and go to his grand kids match. patient was evaluated by psychaitry and neurology. patient was started on lexapro for depression, will f/u methylmalonic acid results    Patient is 75 year old Male with h/o BPH, HTN, right frontal shunt cath locke hydrocephalus, spinal stablization surgery for stenosis,  depression presents with AMS. As Per wife he was unable to recall his wife's name.  Pt himself does not know why he was sent here, states his wife wanted him to take a bath and he did not want to. Denies all symptoms including HA, CP, dizziness, SOB, abd pain, v/d, leg pain or swelling. D/w wife, states x2 days now losing his mind, does not recognize her, sees men in the room and other strange things, and has lost interest in TV. Pt was very depressed for 2-3 months. Pt has been acting very deattached for last 3 months and do not want to eat and go to his grand kids match. patient was evaluated by psychaitry and neurology. patient was started on lexapro for depression, likely it is progressive cognitive impairment ,  pending  methylmalonic acid results  and PT andreaal    Patient is 75 year old Male with h/o BPH, HTN, right frontal shunt cath locke hydrocephalus, spinal stablization surgery for stenosis,  depression presents with AMS. As Per wife he was unable to recall his wife's name.  Pt himself does not know why he was sent here, states his wife wanted him to take a bath and he did not want to. Denies all symptoms including HA, CP, dizziness, SOB, abd pain, v/d, leg pain or swelling. D/w wife, states x2 days now losing his mind, does not recognize her, sees men in the room and other strange things, and has lost interest in TV. Pt was very depressed for 2-3 months. Pt has been acting very deattached for last 3 months and do not want to eat and go to his grand kids match. patient was evaluated by psychaitry and neurology. patient was started on lexapro for depression,neurololgy was consulted likely Slowly progressively worsening dementia.  Fits Alzheimer pattern with the type of hallucinations he has developed, sundowning, and functional gait disorder. patient is to be discharged on lexapro and vitamin B12 supplements and to follow up methylmalonic acid levels . patient is to follow up with  cognitive neurology at [facility]  at [address] [place] [address] ([phone] ). patient is evaluated by PT and recommended home PT .      Patient is 75 year old Male with h/o BPH, HTN, right frontal shunt cath locke hydrocephalus, spinal stablization surgery for stenosis,  depression presents with AMS. As Per wife he was unable to recall his wife's name.  Pt himself does not know why he was sent here, states his wife wanted him to take a bath and he did not want to. Denies all symptoms including HA, CP, dizziness, SOB, abd pain, v/d, leg pain or swelling. D/w wife, states x2 days now losing his mind, does not recognize her, sees men in the room and other strange things, and has lost interest in TV. Pt was very depressed for 2-3 months. Pt has been acting very deattached for last 3 months and do not want to eat and go to his grand kids match. patient was evaluated by psychaitry and neurology. patient was started on lexapro for depression,neurololgy was consulted likely Slowly progressively worsening dementia.  Fits Alzheimer pattern with the type of hallucinations he has developed, sundowning, and functional gait disorder. patient is to be discharged on lexapro and vitamin B12 supplements and to follow up methylmalonic acid levels . patient is to follow up with  cognitive neurology at [facility]  at [address] [place] [address] ([phone] ). patient is evaluated by PT and recommended DWAINE.

## 2021-01-29 NOTE — DISCHARGE NOTE PROVIDER - NSDCCPCAREPLAN_GEN_ALL_CORE_FT
PRINCIPAL DISCHARGE DIAGNOSIS  Diagnosis: Acute encephalopathy  Assessment and Plan of Treatment: You presented with altered mental status and not recognizing your wife, You were seen by psychaitry and neurology,      SECONDARY DISCHARGE DIAGNOSES  Diagnosis: Depression  Assessment and Plan of Treatment: You had past history of depression.   Continue with home medication and follow up with your PCP      Diagnosis: HTN (hypertension)  Assessment and Plan of Treatment: Blood Pressure Control , Please continue current medication regimen, and follow up with your PCP  - You have a history of Hypertension.   - You should continue on the current antihypertensive regimen regularly.  - You blood pressure should be within 140-120/80-90.  - You should follow-up with your PCP within 1 week of your discharge for routine blood pressure monitoring at your next visit.  - Notify your doctor if you have any of the following symptoms:   (Dizziness, Lightheadedness, Blurry vision, Headache, Chest pain, Shortness of breath.)  - You should maintain healthy lifestyle by eating healthy low salt diet, avoid fatty food, weight loss, exercise regularly as tolerated 30 mins X 3 time per week.       PRINCIPAL DISCHARGE DIAGNOSIS  Diagnosis: Acute encephalopathy  Assessment and Plan of Treatment: You presented with altered mental status and not recognizing your wife, You were seen by psychaitry and neurology, You were started on lexapro for depression, likely it is progressive cognitive impairment ,  pending  methylmalonic acid results  and PT eval         SECONDARY DISCHARGE DIAGNOSES  Diagnosis: Depression  Assessment and Plan of Treatment: You had past history of depression. You were started on lexapro,   Continue with Your  medication and follow up with your PCP      Diagnosis: HTN (hypertension)  Assessment and Plan of Treatment: Blood Pressure Control , Please continue current medication regimen, and follow up with your PCP  - You have a history of Hypertension.   - You should continue on the current antihypertensive regimen regularly.  - You blood pressure should be within 140-120/80-90.  - You should follow-up with your PCP within 1 week of your discharge for routine blood pressure monitoring at your next visit.  - Notify your doctor if you have any of the following symptoms:   (Dizziness, Lightheadedness, Blurry vision, Headache, Chest pain, Shortness of breath.)  - You should maintain healthy lifestyle by eating healthy low salt diet, avoid fatty food, weight loss, exercise regularly as tolerated 30 mins X 3 time per week.       PRINCIPAL DISCHARGE DIAGNOSIS  Diagnosis: Acute encephalopathy  Assessment and Plan of Treatment: You presented with altered mental status and not recognizing your wife, You were seen by psychaitry and neurology, You were started on lexapro for depression,neurololgy was consulted likely Slowly progressively worsening dementia.  Fits Alzheimer pattern with the type of hallucinations he has developed, sundowning, and functional gait disorder. You are recommended to continue on lexapro and vitamin B12 supplements and to follow up methylmalonic acid levels . You are recommended to follow up with  cognitive neurology at [facility]  at [address] [place] [address] ([phone] ). You were  evaluated by PT and recommended home PT .         SECONDARY DISCHARGE DIAGNOSES  Diagnosis: Depression  Assessment and Plan of Treatment: You had past history of depression. You were started on lexapro,   Continue with Your  medication and follow up with your PCP      Diagnosis: HTN (hypertension)  Assessment and Plan of Treatment: Blood Pressure Control , Please continue current medication regimen, and follow up with your PCP  - You have a history of Hypertension.   - You should continue on the current antihypertensive regimen regularly.  - You blood pressure should be within 140-120/80-90.  - You should follow-up with your PCP within 1 week of your discharge for routine blood pressure monitoring at your next visit.  - Notify your doctor if you have any of the following symptoms:   (Dizziness, Lightheadedness, Blurry vision, Headache, Chest pain, Shortness of breath.)  - You should maintain healthy lifestyle by eating healthy low salt diet, avoid fatty food, weight loss, exercise regularly as tolerated 30 mins X 3 time per week.       PRINCIPAL DISCHARGE DIAGNOSIS  Diagnosis: Acute encephalopathy  Assessment and Plan of Treatment: You presented with altered mental status and not recognizing your wife, You were seen by psychaitry and neurology, You were started on lexapro for depression,neurololgy was consulted likely Slowly progressively worsening dementia.  Fits Alzheimer pattern with the type of hallucinations he has developed, sundowning, and functional gait disorder. You are recommended to continue on lexapro and vitamin B12 supplements and to follow up methylmalonic acid levels . You are recommended to follow up with  cognitive neurology at [facility]  at [address] [place] [address] ([phone] ). You were  evaluated by PT and recommended DWAINE.         SECONDARY DISCHARGE DIAGNOSES  Diagnosis: Depression  Assessment and Plan of Treatment: You had past history of depression. You were started on lexapro,   Continue with Your  medication and follow up with your PCP      Diagnosis: HTN (hypertension)  Assessment and Plan of Treatment: Blood Pressure Control , Please continue current medication regimen, and follow up with your PCP  - You have a history of Hypertension.   - You should continue on the current antihypertensive regimen regularly.  - You blood pressure should be within 140-120/80-90.  - You should follow-up with your PCP within 1 week of your discharge for routine blood pressure monitoring at your next visit.  - Notify your doctor if you have any of the following symptoms:   (Dizziness, Lightheadedness, Blurry vision, Headache, Chest pain, Shortness of breath.)  - You should maintain healthy lifestyle by eating healthy low salt diet, avoid fatty food, weight loss, exercise regularly as tolerated 30 mins X 3 time per week.

## 2021-01-29 NOTE — PROGRESS NOTE ADULT - SUBJECTIVE AND OBJECTIVE BOX
PGY-1 Progress Note discussed with attending    PAGER #: [[phone]] TILL 5:00 PM  PLEASE CONTACT ON CALL TEAM:  - On Call Team (Please refer to Hi) FROM 5:00 PM - 8:30PM  - Nightfloat Team FROM 8:30 -7:30 AM    CHIEF COMPLAINT & BRIEF HOSPITAL COURSE:    INTERVAL HPI/OVERNIGHT EVENTS:       REVIEW OF SYSTEMS:  CONSTITUTIONAL: No fever, weight loss, or fatigue  RESPIRATORY: No cough, wheezing, chills or hemoptysis; No shortness of breath  CARDIOVASCULAR: No chest pain, palpitations, dizziness, or leg swelling  GASTROINTESTINAL: No abdominal pain. No nausea, vomiting, or hematemesis; No diarrhea or constipation. No melena or hematochezia.  GENITOURINARY: No dysuria or hematuria, urinary frequency  NEUROLOGICAL: No headaches, memory loss, loss of strength, numbness, or tremors  SKIN: No itching, burning, rashes, or lesions     MEDICATIONS  (STANDING):  enalapril 40 milliGRAM(s) Oral daily  finasteride 5 milliGRAM(s) Oral daily  hydrALAZINE Injectable 5 milliGRAM(s) IV Push once  metoprolol tartrate 50 milliGRAM(s) Oral two times a day  sodium chloride 0.9%. 1000 milliLiter(s) (70 mL/Hr) IV Continuous <Continuous>  tamsulosin 0.4 milliGRAM(s) Oral at bedtime    MEDICATIONS  (PRN):      Vital Signs Last 24 Hrs  T(C): 36.5 (29 Jan 2021 07:30), Max: 37.2 (28 Jan 2021 13:07)  T(F): 97.7 (29 Jan 2021 07:30), Max: 99 (28 Jan 2021 13:07)  HR: 70 (29 Jan 2021 07:30) (64 - 71)  BP: 176/72 (29 Jan 2021 07:30) (152/79 - 181/102)  BP(mean): 95 (29 Jan 2021 06:02) (95 - 95)  RR: 18 (29 Jan 2021 07:30) (17 - 20)  SpO2: 100% (29 Jan 2021 07:30) (97% - 100%)    PHYSICAL EXAMINATION:  GENERAL: NAD, well built  HEAD:  Atraumatic, Normocephalic  EYES:  conjunctiva and sclera clear  NECK: Supple, No JVD, Normal thyroid  CHEST/LUNG: Clear to auscultation. Clear to percussion bilaterally; No rales, rhonchi, wheezing, or rubs  HEART: Regular rate and rhythm; No murmurs, rubs, or gallops  ABDOMEN: Soft, Nontender, Nondistended; Bowel sounds present  NERVOUS SYSTEM:  Alert & Oriented X3,    EXTREMITIES:  2+ Peripheral Pulses, No clubbing, cyanosis, or edema  SKIN: warm dry                          13.7   8.21  )-----------( 168      ( 29 Jan 2021 07:45 )             44.6     01-29    139  |  105  |  11  ----------------------------<  82  4.2   |  27  |  0.90    Ca    8.8      29 Jan 2021 07:45  Phos  3.1     01-29  Mg     2.1     01-29    TPro  7.7  /  Alb  3.3<L>  /  TBili  1.6<H>  /  DBili  x   /  AST  33  /  ALT  25  /  AlkPhos  122<H>  01-29    LIVER FUNCTIONS - ( 29 Jan 2021 07:45 )  Alb: 3.3 g/dL / Pro: 7.7 g/dL / ALK PHOS: 122 U/L / ALT: 25 U/L DA / AST: 33 U/L / GGT: x           CARDIAC MARKERS ( 28 Jan 2021 18:35 )  <0.015 ng/mL / x     / x     / x     / x      CARDIAC MARKERS ( 28 Jan 2021 13:41 )  <0.015 ng/mL / x     / x     / x     / x          PT/INR - ( 28 Jan 2021 13:41 )   PT: 13.5 sec;   INR: 1.14 ratio         PTT - ( 28 Jan 2021 13:41 )  PTT:31.3 sec        CAPILLARY BLOOD GLUCOSE  CAPILLARY BLOOD GLUCOSE      POCT Blood Glucose.: 194 mg/dL (28 Jan 2021 13:06)    CAPILLARY BLOOD GLUCOSE      POCT Blood Glucose.: 194 mg/dL (28 Jan 2021 13:06)      RADIOLOGY & ADDITIONAL TESTS:                   PGY-1 Progress Note discussed with attending    PAGER #: [[phone]] TILL 5:00 PM  PLEASE CONTACT ON CALL TEAM:  - On Call Team (Please refer to Hi) FROM 5:00 PM - 8:30PM  - Nightfloat Team FROM 8:30 -7:30 AM        INTERVAL HPI/OVERNIGHT EVENTS:   patient examined be disde, he was disoriented and unable to recognize the place in the morning but later on the day     REVIEW OF SYSTEMS:  CONSTITUTIONAL: No fever, weight loss, or fatigue  RESPIRATORY: No cough, wheezing, chills or hemoptysis; No shortness of breath  CARDIOVASCULAR: No chest pain, palpitations, dizziness, or leg swelling  GASTROINTESTINAL: No abdominal pain. No nausea, vomiting, or hematemesis; No diarrhea or constipation. No melena or hematochezia.  GENITOURINARY: No dysuria or hematuria, urinary frequency  NEUROLOGICAL: No headaches, memory loss, loss of strength, numbness, or tremors  SKIN: No itching, burning, rashes, or lesions     MEDICATIONS  (STANDING):  enalapril 40 milliGRAM(s) Oral daily  finasteride 5 milliGRAM(s) Oral daily  hydrALAZINE Injectable 5 milliGRAM(s) IV Push once  metoprolol tartrate 50 milliGRAM(s) Oral two times a day  sodium chloride 0.9%. 1000 milliLiter(s) (70 mL/Hr) IV Continuous <Continuous>  tamsulosin 0.4 milliGRAM(s) Oral at bedtime    MEDICATIONS  (PRN):      Vital Signs Last 24 Hrs  T(C): 36.5 (29 Jan 2021 07:30), Max: 37.2 (28 Jan 2021 13:07)  T(F): 97.7 (29 Jan 2021 07:30), Max: 99 (28 Jan 2021 13:07)  HR: 70 (29 Jan 2021 07:30) (64 - 71)  BP: 176/72 (29 Jan 2021 07:30) (152/79 - 181/102)  BP(mean): 95 (29 Jan 2021 06:02) (95 - 95)  RR: 18 (29 Jan 2021 07:30) (17 - 20)  SpO2: 100% (29 Jan 2021 07:30) (97% - 100%)    PHYSICAL EXAMINATION:  GENERAL: NAD, well built  HEAD:  Atraumatic, Normocephalic  EYES:  conjunctiva and sclera clear  NECK: Supple, No JVD, Normal thyroid  CHEST/LUNG: Clear to auscultation. Clear to percussion bilaterally; No rales, rhonchi, wheezing, or rubs  HEART: Regular rate and rhythm; No murmurs, rubs, or gallops  ABDOMEN: Soft, Nontender, Nondistended; Bowel sounds present  NERVOUS SYSTEM:  Alert & Oriented X3,    EXTREMITIES:  2+ Peripheral Pulses, No clubbing, cyanosis, or edema  SKIN: warm dry                          13.7   8.21  )-----------( 168      ( 29 Jan 2021 07:45 )             44.6     01-29    139  |  105  |  11  ----------------------------<  82  4.2   |  27  |  0.90    Ca    8.8      29 Jan 2021 07:45  Phos  3.1     01-29  Mg     2.1     01-29    TPro  7.7  /  Alb  3.3<L>  /  TBili  1.6<H>  /  DBili  x   /  AST  33  /  ALT  25  /  AlkPhos  122<H>  01-29    LIVER FUNCTIONS - ( 29 Jan 2021 07:45 )  Alb: 3.3 g/dL / Pro: 7.7 g/dL / ALK PHOS: 122 U/L / ALT: 25 U/L DA / AST: 33 U/L / GGT: x           CARDIAC MARKERS ( 28 Jan 2021 18:35 )  <0.015 ng/mL / x     / x     / x     / x      CARDIAC MARKERS ( 28 Jan 2021 13:41 )  <0.015 ng/mL / x     / x     / x     / x          PT/INR - ( 28 Jan 2021 13:41 )   PT: 13.5 sec;   INR: 1.14 ratio         PTT - ( 28 Jan 2021 13:41 )  PTT:31.3 sec        CAPILLARY BLOOD GLUCOSE  CAPILLARY BLOOD GLUCOSE      POCT Blood Glucose.: 194 mg/dL (28 Jan 2021 13:06)    CAPILLARY BLOOD GLUCOSE      POCT Blood Glucose.: 194 mg/dL (28 Jan 2021 13:06)      RADIOLOGY & ADDITIONAL TESTS:                   PGY-1 Progress Note discussed with attending    PAGER #: [[phone]] TILL 5:00 PM  PLEASE CONTACT ON CALL TEAM:  - On Call Team (Please refer to Hi) FROM 5:00 PM - 8:30PM  - Nightfloat Team FROM 8:30 -7:30 AM        INTERVAL HPI/OVERNIGHT EVENTS:   patient examined be disde, he was disoriented and unable to recognize the place in the morning but later on the day , psychaitry was consulted and patient was more alert and oriented , AAOx 3 , mildly depressed , so started on lexapro, will f/u neurololgy recommendations    REVIEW OF SYSTEMS:  CONSTITUTIONAL: No fever, weight loss, or fatigue  RESPIRATORY: No cough, wheezing, chills or hemoptysis; No shortness of breath  CARDIOVASCULAR: No chest pain, palpitations, dizziness, or leg swelling  GASTROINTESTINAL: No abdominal pain. No nausea, vomiting, or hematemesis; No diarrhea or constipation. No melena or hematochezia.  GENITOURINARY: No dysuria or hematuria, urinary frequency  NEUROLOGICAL: No headaches, memory loss, loss of strength, numbness, or tremors  SKIN: No itching, burning, rashes, or lesions     MEDICATIONS  (STANDING):  enalapril 40 milliGRAM(s) Oral daily  finasteride 5 milliGRAM(s) Oral daily  hydrALAZINE Injectable 5 milliGRAM(s) IV Push once  metoprolol tartrate 50 milliGRAM(s) Oral two times a day  sodium chloride 0.9%. 1000 milliLiter(s) (70 mL/Hr) IV Continuous <Continuous>  tamsulosin 0.4 milliGRAM(s) Oral at bedtime    MEDICATIONS  (PRN):      Vital Signs Last 24 Hrs  T(C): 36.5 (29 Jan 2021 07:30), Max: 37.2 (28 Jan 2021 13:07)  T(F): 97.7 (29 Jan 2021 07:30), Max: 99 (28 Jan 2021 13:07)  HR: 70 (29 Jan 2021 07:30) (64 - 71)  BP: 176/72 (29 Jan 2021 07:30) (152/79 - 181/102)  BP(mean): 95 (29 Jan 2021 06:02) (95 - 95)  RR: 18 (29 Jan 2021 07:30) (17 - 20)  SpO2: 100% (29 Jan 2021 07:30) (97% - 100%)    PHYSICAL EXAMINATION:  GENERAL: NAD, well built  HEAD:  Atraumatic, Normocephalic  EYES:  conjunctiva and sclera clear  NECK: Supple, No JVD, Normal thyroid  CHEST/LUNG: Clear to auscultation. Clear to percussion bilaterally; No rales, rhonchi, wheezing, or rubs  HEART: Regular rate and rhythm; No murmurs, rubs, or gallops  ABDOMEN: Soft, Nontender, Nondistended; Bowel sounds present  NERVOUS SYSTEM:  Alert & Oriented X3,    EXTREMITIES:  2+ Peripheral Pulses, No clubbing, cyanosis, or edema  SKIN: warm dry                          13.7   8.21  )-----------( 168      ( 29 Jan 2021 07:45 )             44.6     01-29    139  |  105  |  11  ----------------------------<  82  4.2   |  27  |  0.90    Ca    8.8      29 Jan 2021 07:45  Phos  3.1     01-29  Mg     2.1     01-29    TPro  7.7  /  Alb  3.3<L>  /  TBili  1.6<H>  /  DBili  x   /  AST  33  /  ALT  25  /  AlkPhos  122<H>  01-29    LIVER FUNCTIONS - ( 29 Jan 2021 07:45 )  Alb: 3.3 g/dL / Pro: 7.7 g/dL / ALK PHOS: 122 U/L / ALT: 25 U/L DA / AST: 33 U/L / GGT: x           CARDIAC MARKERS ( 28 Jan 2021 18:35 )  <0.015 ng/mL / x     / x     / x     / x      CARDIAC MARKERS ( 28 Jan 2021 13:41 )  <0.015 ng/mL / x     / x     / x     / x          PT/INR - ( 28 Jan 2021 13:41 )   PT: 13.5 sec;   INR: 1.14 ratio         PTT - ( 28 Jan 2021 13:41 )  PTT:31.3 sec        CAPILLARY BLOOD GLUCOSE  CAPILLARY BLOOD GLUCOSE      POCT Blood Glucose.: 194 mg/dL (28 Jan 2021 13:06)    CAPILLARY BLOOD GLUCOSE      POCT Blood Glucose.: 194 mg/dL (28 Jan 2021 13:06)      RADIOLOGY & ADDITIONAL TESTS:                   PGY-1 Progress Note discussed with attending    PAGER #: [[phone]] TILL 5:00 PM  PLEASE CONTACT ON CALL TEAM:  - On Call Team (Please refer to Hi) FROM 5:00 PM - 8:30PM  - Nightfloat Team FROM 8:30 -7:30 AM        INTERVAL HPI/OVERNIGHT EVENTS:   patient examined bedside, he was disoriented and unable to recognize the place in the morning but later on the day , psychaitry was consulted and patient was more alert and oriented , AAOx 3 , mildly depressed , so started on lexapro, will f/u neurololgy recommendations    REVIEW OF SYSTEMS:  CONSTITUTIONAL: No fever, weight loss, or fatigue  RESPIRATORY: No cough, wheezing, chills or hemoptysis; No shortness of breath  CARDIOVASCULAR: No chest pain, palpitations, dizziness, or leg swelling  GASTROINTESTINAL: No abdominal pain. No nausea, vomiting, or hematemesis; No diarrhea or constipation. No melena or hematochezia.  GENITOURINARY: No dysuria or hematuria, urinary frequency  NEUROLOGICAL: No headaches, memory loss, loss of strength, numbness, or tremors  SKIN: No itching, burning, rashes, or lesions     MEDICATIONS  (STANDING):  enalapril 40 milliGRAM(s) Oral daily  finasteride 5 milliGRAM(s) Oral daily  hydrALAZINE Injectable 5 milliGRAM(s) IV Push once  metoprolol tartrate 50 milliGRAM(s) Oral two times a day  sodium chloride 0.9%. 1000 milliLiter(s) (70 mL/Hr) IV Continuous <Continuous>  tamsulosin 0.4 milliGRAM(s) Oral at bedtime    MEDICATIONS  (PRN):      Vital Signs Last 24 Hrs  T(C): 36.5 (29 Jan 2021 07:30), Max: 37.2 (28 Jan 2021 13:07)  T(F): 97.7 (29 Jan 2021 07:30), Max: 99 (28 Jan 2021 13:07)  HR: 70 (29 Jan 2021 07:30) (64 - 71)  BP: 176/72 (29 Jan 2021 07:30) (152/79 - 181/102)  BP(mean): 95 (29 Jan 2021 06:02) (95 - 95)  RR: 18 (29 Jan 2021 07:30) (17 - 20)  SpO2: 100% (29 Jan 2021 07:30) (97% - 100%)    PHYSICAL EXAMINATION:  GENERAL: NAD,   HEAD:  Atraumatic, Normocephalic  EYES:  conjunctiva and sclera clear  NECK: Supple, No JVD, Normal thyroid  CHEST/LUNG: Clear to auscultation. Clear to percussion bilaterally; No rales, rhonchi, wheezing, or rubs  HEART: Regular rate and rhythm; No murmurs, rubs, or gallops  ABDOMEN: Soft, Nontender, Nondistended; Bowel sounds present  NERVOUS SYSTEM:  Alert & Oriented ,    EXTREMITIES:  2+ Peripheral Pulses, No clubbing, cyanosis, or edema  SKIN: warm dry                          13.7   8.21  )-----------( 168      ( 29 Jan 2021 07:45 )             44.6     01-29    139  |  105  |  11  ----------------------------<  82  4.2   |  27  |  0.90    Ca    8.8      29 Jan 2021 07:45  Phos  3.1     01-29  Mg     2.1     01-29    TPro  7.7  /  Alb  3.3<L>  /  TBili  1.6<H>  /  DBili  x   /  AST  33  /  ALT  25  /  AlkPhos  122<H>  01-29    LIVER FUNCTIONS - ( 29 Jan 2021 07:45 )  Alb: 3.3 g/dL / Pro: 7.7 g/dL / ALK PHOS: 122 U/L / ALT: 25 U/L DA / AST: 33 U/L / GGT: x           CARDIAC MARKERS ( 28 Jan 2021 18:35 )  <0.015 ng/mL / x     / x     / x     / x      CARDIAC MARKERS ( 28 Jan 2021 13:41 )  <0.015 ng/mL / x     / x     / x     / x          PT/INR - ( 28 Jan 2021 13:41 )   PT: 13.5 sec;   INR: 1.14 ratio         PTT - ( 28 Jan 2021 13:41 )  PTT:31.3 sec        CAPILLARY BLOOD GLUCOSE  CAPILLARY BLOOD GLUCOSE      POCT Blood Glucose.: 194 mg/dL (28 Jan 2021 13:06)    CAPILLARY BLOOD GLUCOSE      POCT Blood Glucose.: 194 mg/dL (28 Jan 2021 13:06)      RADIOLOGY & ADDITIONAL TESTS:

## 2021-01-29 NOTE — BEHAVIORAL HEALTH ASSESSMENT NOTE - RISK ASSESSMENT
Risk factors: Older age, NPH. Protective factors: Absent h/o SI/SA/SIB, stable domicile with supportive family, attachment to independence, cooperative with care. Risk level appears low at the time of assessment. Low Acute Suicide Risk

## 2021-01-29 NOTE — BEHAVIORAL HEALTH ASSESSMENT NOTE - NSBHCONSULTRECOMMENDOTHER_PSY_A_CORE FT
1. Start Lexapro 5 mg qd for adjustment disorder, can be continued by pt's PMD  2. No indication for other standing or PRN psychotropic medications at this time  3. Medical management as directed by primary team  4. Psychiatry is signing off. Reconsult if additional issues arise as inpatient  5. Pt is psych cleared for D/C home as soon as he is medically optimized  6. Case d/w Dr. Solis of primary team    Brenda Mackey MD  Director, Consultation-Liaison Psychiatry Service  [id number]

## 2021-01-29 NOTE — BEHAVIORAL HEALTH ASSESSMENT NOTE - NSBHCHARTREVIEWLAB_PSY_A_CORE FT
13.7   8.21  )-----------( 168      ( 29 Jan 2021 07:45 )             44.6   01-29    139  |  105  |  11  ----------------------------<  82  4.2   |  27  |  0.90    Ca    8.8      29 Jan 2021 07:45  Phos  3.1     01-29  Mg     2.1     01-29    TPro  7.7  /  Alb  3.3<L>  /  TBili  1.6<H>  /  DBili  x   /  AST  33  /  ALT  25  /  AlkPhos  122<H>  01-29

## 2021-01-29 NOTE — DISCHARGE NOTE PROVIDER - NSDCMRMEDTOKEN_GEN_ALL_CORE_FT
enalapril maleate 20 mg tablet: 2 tab(s) orally once a day  finasteride 5 mg oral tablet: 1 tab(s) orally once a day AM  metoprolol succinate 50 mg oral tablet, extended release: 1 tab(s) orally once a day AM  tamsulosin 0.4 mg oral capsule: 1 cap(s) orally once a day AM   enalapril maleate 20 mg tablet: 2 tab(s) orally once a day  escitalopram 5 mg oral tablet: 1 tab(s) orally once a day  finasteride 5 mg oral tablet: 1 tab(s) orally once a day AM  metoprolol succinate 50 mg oral tablet, extended release: 1 tab(s) orally once a day AM  tamsulosin 0.4 mg oral capsule: 1 cap(s) orally once a day AM   enalapril maleate 20 mg tablet: 2 tab(s) orally once a day  escitalopram 5 mg oral tablet: 1 tab(s) orally once a day  finasteride 5 mg oral tablet: 1 tab(s) orally once a day AM  metoprolol succinate 50 mg oral tablet, extended release: 1 tab(s) orally once a day AM  tamsulosin 0.4 mg oral capsule: 1 cap(s) orally once a day AM  Vitamin B12 250 mcg oral tablet: 1 tab(s) orally once a day

## 2021-01-29 NOTE — CONSULT NOTE ADULT - ASSESSMENT
76yo male  76yo male w/ cognitive impairment most likely progressive    Recommendations:    -Please obtain labs: B12, folate, MMA, homocysteine, & RPR    -Continued mgmt per primary team     -PT as tolerated 76yo male w/ cognitive impairment most likely progressive    Recommendations:    -Please obtain labs: B12, folate, TSH, MMA, homocysteine, & RPR    -Continued mgmt per primary team     -PT as tolerated

## 2021-01-29 NOTE — BEHAVIORAL HEALTH ASSESSMENT NOTE - HPI (INCLUDE ILLNESS QUALITY, SEVERITY, DURATION, TIMING, CONTEXT, MODIFYING FACTORS, ASSOCIATED SIGNS AND SYMPTOMS)
75M, , retired and living with wife, with no formal PHx but h/o depression per family, and MHx of NPH s/p shunt, spinal stenosis and BPH, BIBEMS from home for AMS (VH, not recognizing wife). Consulted for delirium. On exam, pt is alert, oriented and pleasant. He engages readily with MD and speaks fluently about his life, reporting that he was born in [place] and came to the [place] with his wife as a young man. 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 Pt says he retired at age 66, but in retrospect, he regrets doing so: "It was stupid of me to retire. I could have kept doing that job for a long time, and it was good for me to get out of the house every day." When asked about reports that he has been having VH recently, pt says, "It's not seeing things so much as wishful thinking--imagining things you wanted to happen that never did." Pt says that nowadays, his biggest problem is lack of exercise ("When you don't move around much, it gets harder and harder"). Pt endorses some mild situational depression which he attributes to aging; he describes current mood as "happy" and firmly denies SI/HI/AVH. Pt says that around the time of his residential, he briefly took an antidepressant prescribed by his PMD, but no longer does so. When MD asks if he would be interested in another antidepressant trial, pt answers, "Sure, why not?"

## 2021-01-29 NOTE — BEHAVIORAL HEALTH ASSESSMENT NOTE - SUMMARY
75M, , retired and living with wife, with no formal PHx but h/o depression per family, and MHx of NPH s/p shunt, spinal stenosis and BPH, BIBEMS from home for AMS (VH, not recognizing wife). Consulted for delirium. On exam, pt is alert, reasonably well-oriented and pleasant and does not manifest any confusion or VH, which suggests that presenting sx were likely due to acute delirium of unknown origin that has now resolved. Pt does endorse some mild situational depression c/w adjustment disorder, and agrees to a trial of SSRI (Lexapro 5 mg qd). Pt is psych cleared for D/C home as soon as he is medically optimized. Pt does not appear to present an acute risk of harm to self or others at the time of assessment, and does not appear to be in need of admission to IP psych at the time of assessment.

## 2021-01-29 NOTE — BEHAVIORAL HEALTH ASSESSMENT NOTE - NSBHCHARTREVIEWINVESTIGATE_PSY_A_CORE FT
< from: 12 Lead ECG (01.28.21 @ 13:07) >    QTC Calculation(Bazett) 431 ms    P Axis 54 degrees    R Axis -13 degrees    T Axis 80 degrees    Diagnosis Line Normal sinus rhythm  Nonspecific T wave abnormality  Abnormal ECG    < end of copied text >

## 2021-01-29 NOTE — BEHAVIORAL HEALTH ASSESSMENT NOTE - NSBHCHARTREVIEWIMAGING_PSY_A_CORE FT
< from: CT Head No Cont (01.28.21 @ 15:15) >    IMPRESSION:  Moderate chronic microvascular changes without evidence of an acute transcortical infarction or hemorrhage.      IMPRESSION:    Moderate spondylosis. No acute osseous abnormality. Consider MRI as clinically warranted.    < end of copied text >

## 2021-01-29 NOTE — PROGRESS NOTE ADULT - PROBLEM SELECTOR PLAN 1
Pt p/w alterred mental status ,   on exam pt more oriented  D/D INCLUDE DEPRESSION VS infectious vs metabolic vs toxic vs dementia causes  f/u urine toxicology,   -RPR, ESR, CRP  -VITAMIN B12 ,FOLATE,  Neurology Dr. CONTRERAS Pt p/w alterred mental status ,   on exam pt more oriented  D/D INCLUDE DEPRESSION VS infectious vs metabolic vs toxic vs dementia causes   urine toxicology neg   Neurology Dr. CONTRERAS  psychaitry Dr Mackey

## 2021-01-29 NOTE — CONSULT NOTE ADULT - ATTENDING COMMENTS
I personally interviewed, examined, and participated in the care of this patient, on 1/30/21.  Reviewed findings and management plan with MAHESH Llamas.  In addition to or instead of the Hx and findings and plan reported above, or in particular, I note as follows:      Examined at 5:20PM.  Pt responds that he is in [place] (he lives in [place]).  When asked if he is at home now he says yes.  He responds that we are in his apartment.  Denies that we are in a hospital.  Gives date in piecemeal fashion: year 2019, month September, no day or date.  President = Talya.  He was assisted to stand up.  Stance and gait wide-based (feet approx a foot apart).  Stride length is less than his foot length.  Confrontation testing of limb motor strength is difficult because of his alternating moment by moment between offering resistance and going along with the examiner (ahmet).  Eventually it is establish that he has at least fairly good strength, to the degree that his wide base and short stride are not due to weakness.      Cognitively not able to be tested for proprioception.       Reflex                           Right    Left   Comment    Biceps                              2        2  Triceps                             3-       3-  He seems to have hyperekplexia rather than true hyperreflexia  Patellar                            3+      3+  Gastroc                            2        2  Plantar                         ?flexor  ?extensor    No tremor or abnormal involuntary movements observed.      Not easy to obtain relaxation; with that limitation, muscle tonus does not appear to be significantly elevated.        IMPRESSION    Marked worsening in cognitive functioning this evening vs. this morning (sundowning).  Refer to Behav Health Assesment.      Mitgehen during motor examination is a classic finding in degenerative dementia.         Gait impairment, fits a gait apraxia.      Slowly progressively worsening dementia.  Fits Alzheimer pattern with the type of hallucinations he has developed, sundowning, mitgehen, and functional gait disorder.        RECOMMENDATIONS      As above. In addition consider referral to cognitive neurology;  [facility]   [address] [place] [address].  [place]   [phone]

## 2021-01-29 NOTE — PROGRESS NOTE ADULT - PROBLEM SELECTOR PLAN 2
pT was hallucinating at home as per wife , seeing a man   as per pt he donot remember hallucinating   Pt has hx of depression   d/d lewy body dementia vs depression, vs alzhimer  Psych consult in am pT was hallucinating at home as per wife , seeing a man   as per pt he donot remember hallucinating   Pt has hx of depression   d/d lewy body dementia vs depression, vs alzhimer  Psych consulted

## 2021-01-30 LAB
ALBUMIN SERPL ELPH-MCNC: 3.4 G/DL — LOW (ref 3.5–5)
ALP SERPL-CCNC: 119 U/L — SIGNIFICANT CHANGE UP (ref 40–120)
ALT FLD-CCNC: 26 U/L DA — SIGNIFICANT CHANGE UP (ref 10–60)
ANION GAP SERPL CALC-SCNC: 8 MMOL/L — SIGNIFICANT CHANGE UP (ref 5–17)
AST SERPL-CCNC: 14 U/L — SIGNIFICANT CHANGE UP (ref 10–40)
BILIRUB SERPL-MCNC: 1.4 MG/DL — HIGH (ref 0.2–1.2)
BUN SERPL-MCNC: 10 MG/DL — SIGNIFICANT CHANGE UP (ref 7–18)
CALCIUM SERPL-MCNC: 9 MG/DL — SIGNIFICANT CHANGE UP (ref 8.4–10.5)
CHLORIDE SERPL-SCNC: 106 MMOL/L — SIGNIFICANT CHANGE UP (ref 96–108)
CO2 SERPL-SCNC: 25 MMOL/L — SIGNIFICANT CHANGE UP (ref 22–31)
CREAT SERPL-MCNC: 0.88 MG/DL — SIGNIFICANT CHANGE UP (ref 0.5–1.3)
GLUCOSE SERPL-MCNC: 109 MG/DL — HIGH (ref 70–99)
HCT VFR BLD CALC: 45.2 % — SIGNIFICANT CHANGE UP (ref 39–50)
HGB BLD-MCNC: 14.2 G/DL — SIGNIFICANT CHANGE UP (ref 13–17)
MAGNESIUM SERPL-MCNC: 2.2 MG/DL — SIGNIFICANT CHANGE UP (ref 1.6–2.6)
MCHC RBC-ENTMCNC: 22.3 PG — LOW (ref 27–34)
MCHC RBC-ENTMCNC: 31.4 GM/DL — LOW (ref 32–36)
MCV RBC AUTO: 70.8 FL — LOW (ref 80–100)
NRBC # BLD: 0 /100 WBCS — SIGNIFICANT CHANGE UP (ref 0–0)
PHOSPHATE SERPL-MCNC: 3.2 MG/DL — SIGNIFICANT CHANGE UP (ref 2.5–4.5)
PLATELET # BLD AUTO: 148 K/UL — LOW (ref 150–400)
POTASSIUM SERPL-MCNC: 3.7 MMOL/L — SIGNIFICANT CHANGE UP (ref 3.5–5.3)
POTASSIUM SERPL-SCNC: 3.7 MMOL/L — SIGNIFICANT CHANGE UP (ref 3.5–5.3)
PROT SERPL-MCNC: 7.5 G/DL — SIGNIFICANT CHANGE UP (ref 6–8.3)
RBC # BLD: 6.38 M/UL — HIGH (ref 4.2–5.8)
RBC # FLD: 15.9 % — HIGH (ref 10.3–14.5)
SODIUM SERPL-SCNC: 139 MMOL/L — SIGNIFICANT CHANGE UP (ref 135–145)
T PALLIDUM AB TITR SER: NEGATIVE — SIGNIFICANT CHANGE UP
WBC # BLD: 9.67 K/UL — SIGNIFICANT CHANGE UP (ref 3.8–10.5)
WBC # FLD AUTO: 9.67 K/UL — SIGNIFICANT CHANGE UP (ref 3.8–10.5)

## 2021-01-30 RX ORDER — ESCITALOPRAM OXALATE 10 MG/1
1 TABLET, FILM COATED ORAL
Qty: 30 | Refills: 0
Start: 2021-01-30 | End: 2021-02-28

## 2021-01-30 RX ORDER — METOPROLOL TARTRATE 50 MG
2.5 TABLET ORAL ONCE
Refills: 0 | Status: COMPLETED | OUTPATIENT
Start: 2021-01-30 | End: 2021-01-30

## 2021-01-30 RX ADMIN — Medication 50 MILLIGRAM(S): at 05:24

## 2021-01-30 RX ADMIN — ENOXAPARIN SODIUM 40 MILLIGRAM(S): 100 INJECTION SUBCUTANEOUS at 11:47

## 2021-01-30 RX ADMIN — Medication 2.5 MILLIGRAM(S): at 16:46

## 2021-01-30 RX ADMIN — Medication 50 MILLIGRAM(S): at 18:43

## 2021-01-30 RX ADMIN — FINASTERIDE 5 MILLIGRAM(S): 5 TABLET, FILM COATED ORAL at 11:47

## 2021-01-30 RX ADMIN — Medication 40 MILLIGRAM(S): at 05:24

## 2021-01-30 RX ADMIN — TAMSULOSIN HYDROCHLORIDE 0.4 MILLIGRAM(S): 0.4 CAPSULE ORAL at 22:04

## 2021-01-30 RX ADMIN — ESCITALOPRAM OXALATE 5 MILLIGRAM(S): 10 TABLET, FILM COATED ORAL at 11:47

## 2021-01-30 NOTE — PROGRESS NOTE ADULT - PROBLEM SELECTOR PLAN 1
Pt p/w alterred mental status ,   on exam pt more oriented  D/D INCLUDE DEPRESSION VS infectious vs metabolic vs toxic vs dementia causes   urine toxicology neg   Neurology Dr. CONTRERAS  psychaitry Dr Mackey  pending PT eval

## 2021-01-30 NOTE — PHYSICAL THERAPY INITIAL EVALUATION ADULT - CRITERIA FOR SKILLED THERAPEUTIC INTERVENTIONS
home with home PT and assist/impairments found/functional limitations in following categories/risk reduction/prevention/anticipated discharge recommendation

## 2021-01-30 NOTE — PROGRESS NOTE ADULT - PROBLEM SELECTOR PLAN 2
pT was hallucinating at home as per wife , seeing a man   as per pt he donot remember hallucinating   Pt has hx of depression   d/d lewy body dementia vs depression, vs alzhimer  Psych consulted

## 2021-01-30 NOTE — PROGRESS NOTE ADULT - SUBJECTIVE AND OBJECTIVE BOX
PGY-1 Progress Note discussed with attending    PAGER #: [[phone]] TILL 5:00 PM  PLEASE CONTACT ON CALL TEAM:  - On Call Team (Please refer to Hi) FROM 5:00 PM - 8:30PM  - Nightfloat Team FROM 8:30 -7:30 AM    CHIEF COMPLAINT & BRIEF HOSPITAL COURSE:  Patient is 75 year old Male with h/o BPH, HTN, right frontal shunt cath locke hydrocephalus, spinal stablization surgery for stenosis,  depression presents with AMS. As Per wife he was unable to recall his wife's name.  Pt himself does not know why he was sent here, states his wife wanted him to take a bath and he did not want to. Denies all symptoms including HA, CP, dizziness, SOB, abd pain, v/d, leg pain or swelling. D/w wife, states x2 days now losing his mind, does not recognize her, sees men in the room and other strange things, and has lost interest in TV. Pt was very depressed for 2-3 months. Pt has been acting very deattached for last 3 months and do not want to eat and go to his grand kids match. patient was evaluated by psychaitry and neurology. patiemt was started on lexapro for depression, will f/u methylmalonic acid results     INTERVAL HPI/OVERNIGHT EVENTS:   patient examined bedside, he is comfortable today , AAO x3 , psychaitry was consulted and patient was started on lexapro, neurololgy was consulted too , patient daughter wanted PT eval, pending PT eval.       REVIEW OF SYSTEMS:  CONSTITUTIONAL: No fever, weight loss, or fatigue  RESPIRATORY: No cough, wheezing, chills or hemoptysis; No shortness of breath  CARDIOVASCULAR: No chest pain, palpitations, dizziness, or leg swelling  GASTROINTESTINAL: No abdominal pain. No nausea, vomiting, or hematemesis; No diarrhea or constipation. No melena or hematochezia.  GENITOURINARY: No dysuria or hematuria, urinary frequency  NEUROLOGICAL: No headaches, memory loss, loss of strength, numbness, or tremors  SKIN: No itching, burning, rashes, or lesions     MEDICATIONS  (STANDING):  enalapril 40 milliGRAM(s) Oral daily  enoxaparin Injectable 40 milliGRAM(s) SubCutaneous daily  escitalopram 5 milliGRAM(s) Oral daily  finasteride 5 milliGRAM(s) Oral daily  metoprolol tartrate 50 milliGRAM(s) Oral two times a day  sodium chloride 0.9%. 1000 milliLiter(s) (70 mL/Hr) IV Continuous <Continuous>  tamsulosin 0.4 milliGRAM(s) Oral at bedtime    MEDICATIONS  (PRN):      Vital Signs Last 24 Hrs  T(C): 36.4 (30 Jan 2021 00:25), Max: 36.4 (29 Jan 2021 16:48)  T(F): 97.5 (30 Jan 2021 00:25), Max: 97.5 (29 Jan 2021 16:48)  HR: 70 (30 Jan 2021 05:21) (70 - 75)  BP: 175/86 (30 Jan 2021 05:21) (142/105 - 188/104)  BP(mean): --  RR: 17 (30 Jan 2021 00:25) (17 - 20)  SpO2: 97% (30 Jan 2021 00:25) (95% - 97%)    PHYSICAL EXAMINATION:  GENERAL: NAD, well built  HEAD:  Atraumatic, Normocephalic  EYES:  conjunctiva and sclera clear  NECK: Supple, No JVD, Normal thyroid  CHEST/LUNG: Clear to auscultation. Clear to percussion bilaterally; No rales, rhonchi, wheezing, or rubs  HEART: Regular rate and rhythm; No murmurs, rubs, or gallops  ABDOMEN: Soft, Nontender, Nondistended; Bowel sounds present  NERVOUS SYSTEM:  Alert & Oriented X3,    EXTREMITIES:  2+ Peripheral Pulses, No clubbing, cyanosis, or edema  SKIN: warm dry                          14.2   9.67  )-----------( 148      ( 30 Jan 2021 07:31 )             45.2     01-30    139  |  106  |  10  ----------------------------<  109<H>  3.7   |  25  |  0.88    Ca    9.0      30 Jan 2021 07:31  Phos  3.2     01-30  Mg     2.2     01-30    TPro  7.5  /  Alb  3.4<L>  /  TBili  1.4<H>  /  DBili  x   /  AST  14  /  ALT  26  /  AlkPhos  119  01-30    LIVER FUNCTIONS - ( 30 Jan 2021 07:31 )  Alb: 3.4 g/dL / Pro: 7.5 g/dL / ALK PHOS: 119 U/L / ALT: 26 U/L DA / AST: 14 U/L / GGT: x           CARDIAC MARKERS ( 28 Jan 2021 18:35 )  <0.015 ng/mL / x     / x     / x     / x      CARDIAC MARKERS ( 28 Jan 2021 13:41 )  <0.015 ng/mL / x     / x     / x     / x          PT/INR - ( 28 Jan 2021 13:41 )   PT: 13.5 sec;   INR: 1.14 ratio         PTT - ( 28 Jan 2021 13:41 )  PTT:31.3 sec        CAPILLARY BLOOD GLUCOSE  CAPILLARY BLOOD GLUCOSE      POCT Blood Glucose.: 194 mg/dL (28 Jan 2021 13:06)    CAPILLARY BLOOD GLUCOSE          RADIOLOGY & ADDITIONAL TESTS:                   PGY-1 Progress Note discussed with attending    PAGER #: [[phone]] TILL 5:00 PM  PLEASE CONTACT ON CALL TEAM:  - On Call Team (Please refer to Hi) FROM 5:00 PM - 8:30PM  - Nightfloat Team FROM 8:30 -7:30 AM    CHIEF COMPLAINT & BRIEF HOSPITAL COURSE:  Patient is 75 year old Male with h/o BPH, HTN, right frontal shunt cath locke hydrocephalus, spinal stablization surgery for stenosis,  depression presents with AMS. As Per wife he was unable to recall his wife's name.  Pt himself does not know why he was sent here, states his wife wanted him to take a bath and he did not want to. Denies all symptoms including HA, CP, dizziness, SOB, abd pain, v/d, leg pain or swelling. D/w wife, states x2 days now losing his mind, does not recognize her, sees men in the room and other strange things, and has lost interest in TV. Pt was very depressed for 2-3 months. Pt has been acting very deattached for last 3 months and do not want to eat and go to his grand kids match. patient was evaluated by psychaitry and neurology. patiemt was started on lexapro for depression, will f/u methylmalonic acid results     INTERVAL HPI/OVERNIGHT EVENTS:   patient examined bedside, he is comfortable today , AAO x3 , psychaitry was consulted and patient was started on lexapro, neurololgy was consulted too , patient daughter wanted PT eval, pending PT eval. patient daughter is concerned that the episodes of disorientation and delerium he is having may be due to the CSF shunt not working well and wants a referral for neurosurgeon to follow up with him.       REVIEW OF SYSTEMS:  CONSTITUTIONAL: No fever, weight loss, or fatigue  RESPIRATORY: No cough, wheezing, chills or hemoptysis; No shortness of breath  CARDIOVASCULAR: No chest pain, palpitations, dizziness, or leg swelling  GASTROINTESTINAL: No abdominal pain. No nausea, vomiting, or hematemesis; No diarrhea or constipation. No melena or hematochezia.  GENITOURINARY: No dysuria or hematuria, urinary frequency  NEUROLOGICAL: No headaches, memory loss, loss of strength, numbness, or tremors  SKIN: No itching, burning, rashes, or lesions     MEDICATIONS  (STANDING):  enalapril 40 milliGRAM(s) Oral daily  enoxaparin Injectable 40 milliGRAM(s) SubCutaneous daily  escitalopram 5 milliGRAM(s) Oral daily  finasteride 5 milliGRAM(s) Oral daily  metoprolol tartrate 50 milliGRAM(s) Oral two times a day  sodium chloride 0.9%. 1000 milliLiter(s) (70 mL/Hr) IV Continuous <Continuous>  tamsulosin 0.4 milliGRAM(s) Oral at bedtime    MEDICATIONS  (PRN):      Vital Signs Last 24 Hrs  T(C): 36.4 (30 Jan 2021 00:25), Max: 36.4 (29 Jan 2021 16:48)  T(F): 97.5 (30 Jan 2021 00:25), Max: 97.5 (29 Jan 2021 16:48)  HR: 70 (30 Jan 2021 05:21) (70 - 75)  BP: 175/86 (30 Jan 2021 05:21) (142/105 - 188/104)  BP(mean): --  RR: 17 (30 Jan 2021 00:25) (17 - 20)  SpO2: 97% (30 Jan 2021 00:25) (95% - 97%)    PHYSICAL EXAMINATION:  GENERAL: NAD, well built  HEAD:  Atraumatic, Normocephalic  EYES:  conjunctiva and sclera clear  NECK: Supple, No JVD, Normal thyroid  CHEST/LUNG: Clear to auscultation. Clear to percussion bilaterally; No rales, rhonchi, wheezing, or rubs  HEART: Regular rate and rhythm; No murmurs, rubs, or gallops  ABDOMEN: Soft, Nontender, Nondistended; Bowel sounds present  NERVOUS SYSTEM:  Alert & Oriented X3,    EXTREMITIES:  2+ Peripheral Pulses, No clubbing, cyanosis, or edema  SKIN: warm dry                          14.2   9.67  )-----------( 148      ( 30 Jan 2021 07:31 )             45.2     01-30    139  |  106  |  10  ----------------------------<  109<H>  3.7   |  25  |  0.88    Ca    9.0      30 Jan 2021 07:31  Phos  3.2     01-30  Mg     2.2     01-30    TPro  7.5  /  Alb  3.4<L>  /  TBili  1.4<H>  /  DBili  x   /  AST  14  /  ALT  26  /  AlkPhos  119  01-30    LIVER FUNCTIONS - ( 30 Jan 2021 07:31 )  Alb: 3.4 g/dL / Pro: 7.5 g/dL / ALK PHOS: 119 U/L / ALT: 26 U/L DA / AST: 14 U/L / GGT: x           CARDIAC MARKERS ( 28 Jan 2021 18:35 )  <0.015 ng/mL / x     / x     / x     / x      CARDIAC MARKERS ( 28 Jan 2021 13:41 )  <0.015 ng/mL / x     / x     / x     / x          PT/INR - ( 28 Jan 2021 13:41 )   PT: 13.5 sec;   INR: 1.14 ratio         PTT - ( 28 Jan 2021 13:41 )  PTT:31.3 sec        CAPILLARY BLOOD GLUCOSE  CAPILLARY BLOOD GLUCOSE      POCT Blood Glucose.: 194 mg/dL (28 Jan 2021 13:06)    CAPILLARY BLOOD GLUCOSE          RADIOLOGY & ADDITIONAL TESTS:

## 2021-01-31 LAB
ALBUMIN SERPL ELPH-MCNC: 3.4 G/DL — LOW (ref 3.5–5)
ALP SERPL-CCNC: 128 U/L — HIGH (ref 40–120)
ALT FLD-CCNC: 26 U/L DA — SIGNIFICANT CHANGE UP (ref 10–60)
ANION GAP SERPL CALC-SCNC: 7 MMOL/L — SIGNIFICANT CHANGE UP (ref 5–17)
AST SERPL-CCNC: 8 U/L — LOW (ref 10–40)
BILIRUB SERPL-MCNC: 1.6 MG/DL — HIGH (ref 0.2–1.2)
BUN SERPL-MCNC: 11 MG/DL — SIGNIFICANT CHANGE UP (ref 7–18)
CALCIUM SERPL-MCNC: 9.3 MG/DL — SIGNIFICANT CHANGE UP (ref 8.4–10.5)
CHLORIDE SERPL-SCNC: 104 MMOL/L — SIGNIFICANT CHANGE UP (ref 96–108)
CO2 SERPL-SCNC: 26 MMOL/L — SIGNIFICANT CHANGE UP (ref 22–31)
CREAT SERPL-MCNC: 0.93 MG/DL — SIGNIFICANT CHANGE UP (ref 0.5–1.3)
GLUCOSE SERPL-MCNC: 112 MG/DL — HIGH (ref 70–99)
HCT VFR BLD CALC: 47.8 % — SIGNIFICANT CHANGE UP (ref 39–50)
HGB BLD-MCNC: 14.9 G/DL — SIGNIFICANT CHANGE UP (ref 13–17)
MAGNESIUM SERPL-MCNC: 2.2 MG/DL — SIGNIFICANT CHANGE UP (ref 1.6–2.6)
MCHC RBC-ENTMCNC: 22.1 PG — LOW (ref 27–34)
MCHC RBC-ENTMCNC: 31.2 GM/DL — LOW (ref 32–36)
MCV RBC AUTO: 71 FL — LOW (ref 80–100)
NRBC # BLD: 0 /100 WBCS — SIGNIFICANT CHANGE UP (ref 0–0)
PHOSPHATE SERPL-MCNC: 3 MG/DL — SIGNIFICANT CHANGE UP (ref 2.5–4.5)
PLATELET # BLD AUTO: 167 K/UL — SIGNIFICANT CHANGE UP (ref 150–400)
POTASSIUM SERPL-MCNC: 3.9 MMOL/L — SIGNIFICANT CHANGE UP (ref 3.5–5.3)
POTASSIUM SERPL-SCNC: 3.9 MMOL/L — SIGNIFICANT CHANGE UP (ref 3.5–5.3)
PROT SERPL-MCNC: 7.7 G/DL — SIGNIFICANT CHANGE UP (ref 6–8.3)
RBC # BLD: 6.73 M/UL — HIGH (ref 4.2–5.8)
RBC # FLD: 16.2 % — HIGH (ref 10.3–14.5)
SODIUM SERPL-SCNC: 137 MMOL/L — SIGNIFICANT CHANGE UP (ref 135–145)
WBC # BLD: 9.83 K/UL — SIGNIFICANT CHANGE UP (ref 3.8–10.5)
WBC # FLD AUTO: 9.83 K/UL — SIGNIFICANT CHANGE UP (ref 3.8–10.5)

## 2021-01-31 RX ADMIN — Medication 50 MILLIGRAM(S): at 17:46

## 2021-01-31 RX ADMIN — Medication 40 MILLIGRAM(S): at 05:40

## 2021-01-31 RX ADMIN — TAMSULOSIN HYDROCHLORIDE 0.4 MILLIGRAM(S): 0.4 CAPSULE ORAL at 20:23

## 2021-01-31 RX ADMIN — Medication 50 MILLIGRAM(S): at 05:40

## 2021-01-31 RX ADMIN — ENOXAPARIN SODIUM 40 MILLIGRAM(S): 100 INJECTION SUBCUTANEOUS at 12:46

## 2021-01-31 RX ADMIN — ESCITALOPRAM OXALATE 5 MILLIGRAM(S): 10 TABLET, FILM COATED ORAL at 12:46

## 2021-01-31 RX ADMIN — FINASTERIDE 5 MILLIGRAM(S): 5 TABLET, FILM COATED ORAL at 12:46

## 2021-01-31 NOTE — PROGRESS NOTE ADULT - SUBJECTIVE AND OBJECTIVE BOX
INTERVAL HPI/OVERNIGHT EVENTS:  Patient seen,doing better,more awake  VITAL SIGNS:  T(F): 97.5 (01-31-21 @ 09:06)  HR: 80 (01-31-21 @ 09:06)  BP: 139/100 (01-31-21 @ 09:06)  RR: 18 (01-31-21 @ 00:48)  SpO2: 96% (01-31-21 @ 00:48)  Wt(kg): --    PHYSICAL EXAM:  awake,no dystress  Constitutional:  Eyes:  ENMT:perrla  Neck:  Respiratory:few rales  Cardiovascular:s1s2,m-none  Gastrointestinal:soft,bs pos  Extremities:  Vascular:  Neurological:no focal deficit  Musculoskeletal:    MEDICATIONS  (STANDING):  enalapril 40 milliGRAM(s) Oral daily  enoxaparin Injectable 40 milliGRAM(s) SubCutaneous daily  escitalopram 5 milliGRAM(s) Oral daily  finasteride 5 milliGRAM(s) Oral daily  metoprolol tartrate 50 milliGRAM(s) Oral two times a day  sodium chloride 0.9%. 1000 milliLiter(s) (70 mL/Hr) IV Continuous <Continuous>  tamsulosin 0.4 milliGRAM(s) Oral at bedtime    MEDICATIONS  (PRN):      Allergies    No Known Allergies    Intolerances        LABS:                        14.9   9.83  )-----------( 167      ( 31 Jan 2021 07:48 )             47.8     01-31    137  |  104  |  11  ----------------------------<  112<H>  3.9   |  26  |  0.93    Ca    9.3      31 Jan 2021 07:48  Phos  3.0     01-31  Mg     2.2     01-31    TPro  7.7  /  Alb  3.4<L>  /  TBili  1.6<H>  /  DBili  x   /  AST  8<L>  /  ALT  26  /  AlkPhos  128<H>  01-31  Assessment and Plan:   · Assessment	  Patient is 75 year old Male with h/o BPH, HTN,  depression presents with AMS. As Per wife he was unable to recall his wife's name.  Pt himself does not know why he was sent here, states his wife wanted him to take a bath and he did not want to. Admitted for delirium     Problem/Plan - 1:  ·  Problem: Acute encephalopathy.  Plan: Pt p/w alterred mental status ,   on exam pt more oriented   urine toxicology neg   Neurology Dr. CONTRERAS  psychaitry Dr Mackey  pending PT eval. appret  oob to chair     Problem/Plan - 2:  ·  Problem: Delirium.  Plan: pT was hallucinating at home as per wife , seeing a man   as per pt he donot remember hallucinating   Pt has hx of depression   d/d lewy body dementia vs depression, vs alzhimer  Psych consulted.      Problem/Plan - 3:  ·  Problem: HTN (hypertension).  Plan: Pt has hx of depression   resumed home meds.      Problem/Plan - 4:  ·  Problem: Prophylactic measure.  Plan: RISK                                                          Points  [] Previous VTE                                           3  [] Thrombophilia                                        2  [] Lower limb paralysis                              2   [] Current Cancer                                       2   [x] Immobilization > 24 hrs                        1  [] ICU/CCU stay > 24 hours                       1  [x] Age > 60                                                   1    sc lovenox.

## 2021-02-01 LAB
ALBUMIN SERPL ELPH-MCNC: 2.9 G/DL — LOW (ref 3.5–5)
ALP SERPL-CCNC: 109 U/L — SIGNIFICANT CHANGE UP (ref 40–120)
ALT FLD-CCNC: 25 U/L DA — SIGNIFICANT CHANGE UP (ref 10–60)
ANION GAP SERPL CALC-SCNC: 6 MMOL/L — SIGNIFICANT CHANGE UP (ref 5–17)
AST SERPL-CCNC: 9 U/L — LOW (ref 10–40)
BILIRUB SERPL-MCNC: 1.2 MG/DL — SIGNIFICANT CHANGE UP (ref 0.2–1.2)
BUN SERPL-MCNC: 25 MG/DL — HIGH (ref 7–18)
CALCIUM SERPL-MCNC: 8.6 MG/DL — SIGNIFICANT CHANGE UP (ref 8.4–10.5)
CHLORIDE SERPL-SCNC: 104 MMOL/L — SIGNIFICANT CHANGE UP (ref 96–108)
CO2 SERPL-SCNC: 27 MMOL/L — SIGNIFICANT CHANGE UP (ref 22–31)
CREAT SERPL-MCNC: 1.03 MG/DL — SIGNIFICANT CHANGE UP (ref 0.5–1.3)
GLUCOSE SERPL-MCNC: 91 MG/DL — SIGNIFICANT CHANGE UP (ref 70–99)
HCT VFR BLD CALC: 44.7 % — SIGNIFICANT CHANGE UP (ref 39–50)
HGB BLD-MCNC: 14.1 G/DL — SIGNIFICANT CHANGE UP (ref 13–17)
MAGNESIUM SERPL-MCNC: 2.4 MG/DL — SIGNIFICANT CHANGE UP (ref 1.6–2.6)
MCHC RBC-ENTMCNC: 22.4 PG — LOW (ref 27–34)
MCHC RBC-ENTMCNC: 31.5 GM/DL — LOW (ref 32–36)
MCV RBC AUTO: 71 FL — LOW (ref 80–100)
NRBC # BLD: 0 /100 WBCS — SIGNIFICANT CHANGE UP (ref 0–0)
PHOSPHATE SERPL-MCNC: 3.6 MG/DL — SIGNIFICANT CHANGE UP (ref 2.5–4.5)
PLATELET # BLD AUTO: 165 K/UL — SIGNIFICANT CHANGE UP (ref 150–400)
POTASSIUM SERPL-MCNC: 4 MMOL/L — SIGNIFICANT CHANGE UP (ref 3.5–5.3)
POTASSIUM SERPL-SCNC: 4 MMOL/L — SIGNIFICANT CHANGE UP (ref 3.5–5.3)
PROT SERPL-MCNC: 7 G/DL — SIGNIFICANT CHANGE UP (ref 6–8.3)
RBC # BLD: 6.3 M/UL — HIGH (ref 4.2–5.8)
RBC # FLD: 15.2 % — HIGH (ref 10.3–14.5)
SODIUM SERPL-SCNC: 137 MMOL/L — SIGNIFICANT CHANGE UP (ref 135–145)
WBC # BLD: 10.75 K/UL — HIGH (ref 3.8–10.5)
WBC # FLD AUTO: 10.75 K/UL — HIGH (ref 3.8–10.5)

## 2021-02-01 RX ORDER — ESCITALOPRAM OXALATE 10 MG/1
1 TABLET, FILM COATED ORAL
Qty: 30 | Refills: 0
Start: 2021-02-01 | End: 2021-03-02

## 2021-02-01 RX ORDER — PREGABALIN 225 MG/1
1 CAPSULE ORAL
Qty: 30 | Refills: 0
Start: 2021-02-01 | End: 2021-03-02

## 2021-02-01 RX ADMIN — FINASTERIDE 5 MILLIGRAM(S): 5 TABLET, FILM COATED ORAL at 12:54

## 2021-02-01 RX ADMIN — Medication 40 MILLIGRAM(S): at 06:18

## 2021-02-01 RX ADMIN — TAMSULOSIN HYDROCHLORIDE 0.4 MILLIGRAM(S): 0.4 CAPSULE ORAL at 21:56

## 2021-02-01 RX ADMIN — Medication 50 MILLIGRAM(S): at 06:18

## 2021-02-01 RX ADMIN — Medication 50 MILLIGRAM(S): at 17:18

## 2021-02-01 RX ADMIN — ESCITALOPRAM OXALATE 5 MILLIGRAM(S): 10 TABLET, FILM COATED ORAL at 12:54

## 2021-02-01 RX ADMIN — ENOXAPARIN SODIUM 40 MILLIGRAM(S): 100 INJECTION SUBCUTANEOUS at 12:54

## 2021-02-01 NOTE — PROGRESS NOTE ADULT - SUBJECTIVE AND OBJECTIVE BOX
INTERVAL HPI/OVERNIGHT EVENTS:  Patient seen,awake,doing better  VITAL SIGNS:  T(F): 97.9 (02-01-21 @ 08:29)  HR: 58 (02-01-21 @ 08:29)  BP: 140/90 (02-01-21 @ 08:29)  RR: 16 (02-01-21 @ 08:29)  SpO2: 100% (02-01-21 @ 08:29)  Wt(kg): --    PHYSICAL EXAM:  awake,alert  Constitutional:  Eyes:  ENMT:perrla,eom intact  Neck:  Respiratory:clear  Cardiovascular:s1,s2 m-none  Gastrointestinal:soft,bs pos  Extremities:  Vascular:  Neurological:no focal deficit  Musculoskeletal:    MEDICATIONS  (STANDING):  enalapril 40 milliGRAM(s) Oral daily  enoxaparin Injectable 40 milliGRAM(s) SubCutaneous daily  escitalopram 5 milliGRAM(s) Oral daily  finasteride 5 milliGRAM(s) Oral daily  metoprolol tartrate 50 milliGRAM(s) Oral two times a day  sodium chloride 0.9%. 1000 milliLiter(s) (70 mL/Hr) IV Continuous <Continuous>  tamsulosin 0.4 milliGRAM(s) Oral at bedtime    MEDICATIONS  (PRN):      Allergies    No Known Allergies    Intolerances        LABS:                        14.1   10.75 )-----------( 165      ( 01 Feb 2021 09:11 )             44.7     02-01    137  |  104  |  25<H>  ----------------------------<  91  4.0   |  27  |  1.03    Ca    8.6      01 Feb 2021 09:11  Phos  3.6     02-01  Mg     2.4     02-01    TPro  7.0  /  Alb  2.9<L>  /  TBili  1.2  /  DBili  x   /  AST  9<L>  /  ALT  25  /  AlkPhos  109  02-01    Assessment and Plan:   · Assessment	  Patient is 75 year old Male with h/o BPH, HTN,  depression presents with AMS. As Per wife he was unable to recall his wife's name.  Pt himself does not know why he was sent here, states his wife wanted him to take a bath and he did not want to. Admitted for delirium     Problem/Plan - 1:  ·  Problem: Acute encephalopathy-improved clinically  on exam pt more oriented   urine toxicology neg   Neurology Dr. CONTRERAS  psychaitry Dr Mackey  pending PT eval. appret  oob to chair  d/c planning     Problem/Plan - 2:  ·  Problem: Delirium.  Plan: pT was hallucinating at home as per wife , seeing a man   as per pt he donot remember hallucinating   Pt has hx of depression   d/d lewy body dementia vs depression, vs alzhimer  Psych consulted.      Problem/Plan - 3:  ·  Problem: HTN (hypertension).  Plan: Pt has hx of depression   resumed home meds.      Problem/Plan - 4:  ·  Problem: Prophylactic measure.  Plan: RISK                                                          Points  [] Previous VTE                                           3  [] Thrombophilia                                        2  [] Lower limb paralysis                              2   [] Current Cancer                                       2   [x] Immobilization > 24 hrs                        1  [] ICU/CCU stay > 24 hours                       1  [x] Age > 60                                                   1    sc lovenox.

## 2021-02-01 NOTE — PROGRESS NOTE ADULT - SUBJECTIVE AND OBJECTIVE BOX
PGY-1 Progress Note discussed with attending    PAGER #: [[phone]] TILL 5:00 PM  PLEASE CONTACT ON CALL TEAM:  - On Call Team (Please refer to Hi) FROM 5:00 PM - 8:30PM  - Nightfloat Team FROM 8:30 -7:30 AM    CHIEF COMPLAINT & BRIEF HOSPITAL COURSE:    INTERVAL HPI/OVERNIGHT EVENTS:       REVIEW OF SYSTEMS:  CONSTITUTIONAL: No fever, weight loss, or fatigue  RESPIRATORY: No cough, wheezing, chills or hemoptysis; No shortness of breath  CARDIOVASCULAR: No chest pain, palpitations, dizziness, or leg swelling  GASTROINTESTINAL: No abdominal pain. No nausea, vomiting, or hematemesis; No diarrhea or constipation. No melena or hematochezia.  GENITOURINARY: No dysuria or hematuria, urinary frequency  NEUROLOGICAL: No headaches, memory loss, loss of strength, numbness, or tremors  SKIN: No itching, burning, rashes, or lesions     MEDICATIONS  (STANDING):  enalapril 40 milliGRAM(s) Oral daily  enoxaparin Injectable 40 milliGRAM(s) SubCutaneous daily  escitalopram 5 milliGRAM(s) Oral daily  finasteride 5 milliGRAM(s) Oral daily  metoprolol tartrate 50 milliGRAM(s) Oral two times a day  sodium chloride 0.9%. 1000 milliLiter(s) (70 mL/Hr) IV Continuous <Continuous>  tamsulosin 0.4 milliGRAM(s) Oral at bedtime    MEDICATIONS  (PRN):      Vital Signs Last 24 Hrs  T(C): 36.6 (01 Feb 2021 08:29), Max: 37.2 (01 Feb 2021 00:29)  T(F): 97.9 (01 Feb 2021 08:29), Max: 98.9 (01 Feb 2021 00:29)  HR: 64 (01 Feb 2021 12:15) (58 - 69)  BP: 150/92 (01 Feb 2021 12:15) (129/82 - 163/84)  BP(mean): --  RR: 16 (01 Feb 2021 08:29) (16 - 18)  SpO2: 94% (01 Feb 2021 12:15) (94% - 100%)    PHYSICAL EXAMINATION:  GENERAL: NAD, well built  HEAD:  Atraumatic, Normocephalic  EYES:  conjunctiva and sclera clear  NECK: Supple, No JVD, Normal thyroid  CHEST/LUNG: Clear to auscultation. Clear to percussion bilaterally; No rales, rhonchi, wheezing, or rubs  HEART: Regular rate and rhythm; No murmurs, rubs, or gallops  ABDOMEN: Soft, Nontender, Nondistended; Bowel sounds present  NERVOUS SYSTEM:  Alert & Oriented X3,    EXTREMITIES:  2+ Peripheral Pulses, No clubbing, cyanosis, or edema  SKIN: warm dry                          14.1   10.75 )-----------( 165      ( 01 Feb 2021 09:11 )             44.7     02-01    137  |  104  |  25<H>  ----------------------------<  91  4.0   |  27  |  1.03    Ca    8.6      01 Feb 2021 09:11  Phos  3.6     02-01  Mg     2.4     02-01    TPro  7.0  /  Alb  2.9<L>  /  TBili  1.2  /  DBili  x   /  AST  9<L>  /  ALT  25  /  AlkPhos  109  02-01    LIVER FUNCTIONS - ( 01 Feb 2021 09:11 )  Alb: 2.9 g/dL / Pro: 7.0 g/dL / ALK PHOS: 109 U/L / ALT: 25 U/L DA / AST: 9 U/L / GGT: x                       CAPILLARY BLOOD GLUCOSE  CAPILLARY BLOOD GLUCOSE        CAPILLARY BLOOD GLUCOSE          RADIOLOGY & ADDITIONAL TESTS:                   PGY-1 Progress Note discussed with attending    PAGER #: [[phone]] TILL 5:00 PM  PLEASE CONTACT ON CALL TEAM:  - On Call Team (Please refer to Hi) FROM 5:00 PM - 8:30PM  - Nightfloat Team FROM 8:30 -7:30 AM        INTERVAL HPI/OVERNIGHT EVENTS:       REVIEW OF SYSTEMS:  CONSTITUTIONAL: No fever, weight loss, or fatigue  RESPIRATORY: No cough, wheezing, chills or hemoptysis; No shortness of breath  CARDIOVASCULAR: No chest pain, palpitations, dizziness, or leg swelling  GASTROINTESTINAL: No abdominal pain. No nausea, vomiting, or hematemesis; No diarrhea or constipation. No melena or hematochezia.  GENITOURINARY: No dysuria or hematuria, urinary frequency  NEUROLOGICAL: No headaches, memory loss, loss of strength, numbness, or tremors  SKIN: No itching, burning, rashes, or lesions     MEDICATIONS  (STANDING):  enalapril 40 milliGRAM(s) Oral daily  enoxaparin Injectable 40 milliGRAM(s) SubCutaneous daily  escitalopram 5 milliGRAM(s) Oral daily  finasteride 5 milliGRAM(s) Oral daily  metoprolol tartrate 50 milliGRAM(s) Oral two times a day  sodium chloride 0.9%. 1000 milliLiter(s) (70 mL/Hr) IV Continuous <Continuous>  tamsulosin 0.4 milliGRAM(s) Oral at bedtime    MEDICATIONS  (PRN):      Vital Signs Last 24 Hrs  T(C): 36.6 (01 Feb 2021 08:29), Max: 37.2 (01 Feb 2021 00:29)  T(F): 97.9 (01 Feb 2021 08:29), Max: 98.9 (01 Feb 2021 00:29)  HR: 64 (01 Feb 2021 12:15) (58 - 69)  BP: 150/92 (01 Feb 2021 12:15) (129/82 - 163/84)  BP(mean): --  RR: 16 (01 Feb 2021 08:29) (16 - 18)  SpO2: 94% (01 Feb 2021 12:15) (94% - 100%)    PHYSICAL EXAMINATION:  GENERAL: NAD, well built  HEAD:  Atraumatic, Normocephalic  EYES:  conjunctiva and sclera clear  NECK: Supple, No JVD, Normal thyroid  CHEST/LUNG: Clear to auscultation. Clear to percussion bilaterally; No rales, rhonchi, wheezing, or rubs  HEART: Regular rate and rhythm; No murmurs, rubs, or gallops  ABDOMEN: Soft, Nontender, Nondistended; Bowel sounds present  NERVOUS SYSTEM:  Alert & Oriented X3,    EXTREMITIES:  2+ Peripheral Pulses, No clubbing, cyanosis, or edema  SKIN: warm dry                          14.1   10.75 )-----------( 165      ( 01 Feb 2021 09:11 )             44.7     02-01    137  |  104  |  25<H>  ----------------------------<  91  4.0   |  27  |  1.03    Ca    8.6      01 Feb 2021 09:11  Phos  3.6     02-01  Mg     2.4     02-01    TPro  7.0  /  Alb  2.9<L>  /  TBili  1.2  /  DBili  x   /  AST  9<L>  /  ALT  25  /  AlkPhos  109  02-01    LIVER FUNCTIONS - ( 01 Feb 2021 09:11 )  Alb: 2.9 g/dL / Pro: 7.0 g/dL / ALK PHOS: 109 U/L / ALT: 25 U/L DA / AST: 9 U/L / GGT: x                       CAPILLARY BLOOD GLUCOSE  CAPILLARY BLOOD GLUCOSE        CAPILLARY BLOOD GLUCOSE          RADIOLOGY & ADDITIONAL TESTS:                   PGY-1 Progress Note discussed with attending    PAGER #: [[phone]] TILL 5:00 PM  PLEASE CONTACT ON CALL TEAM:  - On Call Team (Please refer to Hi) FROM 5:00 PM - 8:30PM  - Nightfloat Team FROM 8:30 -7:30 AM      INTERVAL HPI/OVERNIGHT EVENTS:   patient examined bedside, he is comfortable today , AAO x3 , psychaitry was consulted and patient was started on lexapro, neurololgy was consulted likely Slowly progressively worsening dementia.  Fits Alzheimer pattern with the type of hallucinations he has developed, sundowning, and functional gait disorder.  will be dc to follow up with cognitive neurology; at [facility] .    REVIEW OF SYSTEMS:  CONSTITUTIONAL: No fever, weight loss, or fatigue  RESPIRATORY: No cough, wheezing, chills or hemoptysis; No shortness of breath  CARDIOVASCULAR: No chest pain, palpitations, dizziness, or leg swelling  GASTROINTESTINAL: No abdominal pain. No nausea, vomiting, or hematemesis; No diarrhea or constipation. No melena or hematochezia.  GENITOURINARY: No dysuria or hematuria, urinary frequency  NEUROLOGICAL: No headaches, memory loss, loss of strength, numbness, or tremors  SKIN: No itching, burning, rashes, or lesions     MEDICATIONS  (STANDING):  enalapril 40 milliGRAM(s) Oral daily  enoxaparin Injectable 40 milliGRAM(s) SubCutaneous daily  escitalopram 5 milliGRAM(s) Oral daily  finasteride 5 milliGRAM(s) Oral daily  metoprolol tartrate 50 milliGRAM(s) Oral two times a day  sodium chloride 0.9%. 1000 milliLiter(s) (70 mL/Hr) IV Continuous <Continuous>  tamsulosin 0.4 milliGRAM(s) Oral at bedtime    MEDICATIONS  (PRN):      Vital Signs Last 24 Hrs  T(C): 36.6 (01 Feb 2021 08:29), Max: 37.2 (01 Feb 2021 00:29)  T(F): 97.9 (01 Feb 2021 08:29), Max: 98.9 (01 Feb 2021 00:29)  HR: 64 (01 Feb 2021 12:15) (58 - 69)  BP: 150/92 (01 Feb 2021 12:15) (129/82 - 163/84)  BP(mean): --  RR: 16 (01 Feb 2021 08:29) (16 - 18)  SpO2: 94% (01 Feb 2021 12:15) (94% - 100%)    PHYSICAL EXAMINATION:  GENERAL: NAD, well built  HEAD:  Atraumatic, Normocephalic  EYES:  conjunctiva and sclera clear  NECK: Supple, No JVD, Normal thyroid  CHEST/LUNG: Clear to auscultation. Clear to percussion bilaterally; No rales, rhonchi, wheezing, or rubs  HEART: Regular rate and rhythm; No murmurs, rubs, or gallops  ABDOMEN: Soft, Nontender, Nondistended; Bowel sounds present  NERVOUS SYSTEM:  Alert & Oriented X3,    EXTREMITIES:  2+ Peripheral Pulses, No clubbing, cyanosis, or edema  SKIN: warm dry                          14.1   10.75 )-----------( 165      ( 01 Feb 2021 09:11 )             44.7     02-01    137  |  104  |  25<H>  ----------------------------<  91  4.0   |  27  |  1.03    Ca    8.6      01 Feb 2021 09:11  Phos  3.6     02-01  Mg     2.4     02-01    TPro  7.0  /  Alb  2.9<L>  /  TBili  1.2  /  DBili  x   /  AST  9<L>  /  ALT  25  /  AlkPhos  109  02-01    LIVER FUNCTIONS - ( 01 Feb 2021 09:11 )  Alb: 2.9 g/dL / Pro: 7.0 g/dL / ALK PHOS: 109 U/L / ALT: 25 U/L DA / AST: 9 U/L / GGT: x                       CAPILLARY BLOOD GLUCOSE  CAPILLARY BLOOD GLUCOSE        CAPILLARY BLOOD GLUCOSE          RADIOLOGY & ADDITIONAL TESTS:

## 2021-02-01 NOTE — PROGRESS NOTE ADULT - PROBLEM SELECTOR PLAN 1
Pt p/w alterred mental status ,   on exam pt more oriented  D/D INCLUDE DEPRESSION VS infectious vs metabolic vs toxic vs dementia causes   urine toxicology neg   Neurology Dr. CONTRERAS  psychaitry Dr Mackey  pending PT eval Pt p/w alterred mental status ,   on exam pt more oriented  D/D INCLUDE DEPRESSION VS infectious vs metabolic vs toxic vs dementia causes   urine toxicology neg   Neurology Dr. CONTRERAS  psychaitry Dr Mackey   PT [facility] home PT

## 2021-02-02 LAB
ALBUMIN SERPL ELPH-MCNC: 2.9 G/DL — LOW (ref 3.5–5)
ALP SERPL-CCNC: 106 U/L — SIGNIFICANT CHANGE UP (ref 40–120)
ALT FLD-CCNC: 26 U/L DA — SIGNIFICANT CHANGE UP (ref 10–60)
ANION GAP SERPL CALC-SCNC: 6 MMOL/L — SIGNIFICANT CHANGE UP (ref 5–17)
AST SERPL-CCNC: 10 U/L — SIGNIFICANT CHANGE UP (ref 10–40)
BILIRUB SERPL-MCNC: 1.2 MG/DL — SIGNIFICANT CHANGE UP (ref 0.2–1.2)
BUN SERPL-MCNC: 25 MG/DL — HIGH (ref 7–18)
CALCIUM SERPL-MCNC: 8.5 MG/DL — SIGNIFICANT CHANGE UP (ref 8.4–10.5)
CHLORIDE SERPL-SCNC: 105 MMOL/L — SIGNIFICANT CHANGE UP (ref 96–108)
CO2 SERPL-SCNC: 25 MMOL/L — SIGNIFICANT CHANGE UP (ref 22–31)
CREAT SERPL-MCNC: 0.9 MG/DL — SIGNIFICANT CHANGE UP (ref 0.5–1.3)
GLUCOSE SERPL-MCNC: 89 MG/DL — SIGNIFICANT CHANGE UP (ref 70–99)
HCT VFR BLD CALC: 41.5 % — SIGNIFICANT CHANGE UP (ref 39–50)
HGB BLD-MCNC: 13.1 G/DL — SIGNIFICANT CHANGE UP (ref 13–17)
MAGNESIUM SERPL-MCNC: 2.2 MG/DL — SIGNIFICANT CHANGE UP (ref 1.6–2.6)
MCHC RBC-ENTMCNC: 22.1 PG — LOW (ref 27–34)
MCHC RBC-ENTMCNC: 31.6 GM/DL — LOW (ref 32–36)
MCV RBC AUTO: 70.1 FL — LOW (ref 80–100)
NRBC # BLD: 0 /100 WBCS — SIGNIFICANT CHANGE UP (ref 0–0)
PHOSPHATE SERPL-MCNC: 2.7 MG/DL — SIGNIFICANT CHANGE UP (ref 2.5–4.5)
PLATELET # BLD AUTO: 131 K/UL — LOW (ref 150–400)
POTASSIUM SERPL-MCNC: 3.8 MMOL/L — SIGNIFICANT CHANGE UP (ref 3.5–5.3)
POTASSIUM SERPL-SCNC: 3.8 MMOL/L — SIGNIFICANT CHANGE UP (ref 3.5–5.3)
PROT SERPL-MCNC: 6.7 G/DL — SIGNIFICANT CHANGE UP (ref 6–8.3)
RBC # BLD: 5.92 M/UL — HIGH (ref 4.2–5.8)
RBC # FLD: 14.8 % — HIGH (ref 10.3–14.5)
SODIUM SERPL-SCNC: 136 MMOL/L — SIGNIFICANT CHANGE UP (ref 135–145)
WBC # BLD: 8.8 K/UL — SIGNIFICANT CHANGE UP (ref 3.8–10.5)
WBC # FLD AUTO: 8.8 K/UL — SIGNIFICANT CHANGE UP (ref 3.8–10.5)

## 2021-02-02 RX ADMIN — TAMSULOSIN HYDROCHLORIDE 0.4 MILLIGRAM(S): 0.4 CAPSULE ORAL at 22:19

## 2021-02-02 RX ADMIN — Medication 50 MILLIGRAM(S): at 17:15

## 2021-02-02 RX ADMIN — ENOXAPARIN SODIUM 40 MILLIGRAM(S): 100 INJECTION SUBCUTANEOUS at 11:07

## 2021-02-02 RX ADMIN — Medication 40 MILLIGRAM(S): at 06:19

## 2021-02-02 RX ADMIN — FINASTERIDE 5 MILLIGRAM(S): 5 TABLET, FILM COATED ORAL at 11:07

## 2021-02-02 RX ADMIN — Medication 50 MILLIGRAM(S): at 06:19

## 2021-02-02 RX ADMIN — ESCITALOPRAM OXALATE 5 MILLIGRAM(S): 10 TABLET, FILM COATED ORAL at 11:07

## 2021-02-02 NOTE — PROGRESS NOTE ADULT - SUBJECTIVE AND OBJECTIVE BOX
PGY-1 Progress Note discussed with attending    PAGER #: [[phone]] TILL 5:00 PM  PLEASE CONTACT ON CALL TEAM:  - On Call Team (Please refer to Hi) FROM 5:00 PM - 8:30PM  - Nightfloat Team FROM 8:30 -7:30 AM        INTERVAL HPI/OVERNIGHT EVENTS:   patient examined bedside, he is comfortable today , AAO x3 , psychaitry was consulted and patient was started on lexapro, neurololgy was consulted likely Slowly progressively worsening dementia.  Fits Alzheimer pattern with the type of hallucinations he has developed, sundowning, and functional gait disorder.  will be dc to follow up with cognitive neurology; at [facility] .      REVIEW OF SYSTEMS:  CONSTITUTIONAL: No fever, weight loss, or fatigue  RESPIRATORY: No cough, wheezing, chills or hemoptysis; No shortness of breath  CARDIOVASCULAR: No chest pain, palpitations, dizziness, or leg swelling  GASTROINTESTINAL: No abdominal pain. No nausea, vomiting, or hematemesis; No diarrhea or constipation. No melena or hematochezia.  GENITOURINARY: No dysuria or hematuria, urinary frequency  NEUROLOGICAL: No headaches, memory loss, loss of strength, numbness, or tremors  SKIN: No itching, burning, rashes, or lesions     MEDICATIONS  (STANDING):  enalapril 40 milliGRAM(s) Oral daily  enoxaparin Injectable 40 milliGRAM(s) SubCutaneous daily  escitalopram 5 milliGRAM(s) Oral daily  finasteride 5 milliGRAM(s) Oral daily  metoprolol tartrate 50 milliGRAM(s) Oral two times a day  sodium chloride 0.9%. 1000 milliLiter(s) (70 mL/Hr) IV Continuous <Continuous>  tamsulosin 0.4 milliGRAM(s) Oral at bedtime    MEDICATIONS  (PRN):      Vital Signs Last 24 Hrs  T(C): 36.6 (02 Feb 2021 07:20), Max: 36.7 (02 Feb 2021 00:05)  T(F): 97.9 (02 Feb 2021 07:20), Max: 98 (02 Feb 2021 00:05)  HR: 63 (02 Feb 2021 07:20) (61 - 74)  BP: 148/89 (02 Feb 2021 07:20) (112/75 - 152/75)  BP(mean): --  RR: 16 (02 Feb 2021 07:20) (16 - 17)  SpO2: 94% (02 Feb 2021 07:20) (94% - 100%)    PHYSICAL EXAMINATION:  GENERAL: NAD, well built  HEAD:  Atraumatic, Normocephalic  EYES:  conjunctiva and sclera clear  NECK: Supple, No JVD, Normal thyroid  CHEST/LUNG: Clear to auscultation. Clear to percussion bilaterally; No rales, rhonchi, wheezing, or rubs  HEART: Regular rate and rhythm; No murmurs, rubs, or gallops  ABDOMEN: Soft, Nontender, Nondistended; Bowel sounds present  NERVOUS SYSTEM:  Alert & Oriented X3,    EXTREMITIES:  2+ Peripheral Pulses, No clubbing, cyanosis, or edema  SKIN: warm dry                          13.1   8.80  )-----------( 131      ( 02 Feb 2021 07:18 )             41.5     02-02    136  |  105  |  25<H>  ----------------------------<  89  3.8   |  25  |  0.90    Ca    8.5      02 Feb 2021 07:18  Phos  2.7     02-02  Mg     2.2     02-02    TPro  6.7  /  Alb  2.9<L>  /  TBili  1.2  /  DBili  x   /  AST  10  /  ALT  26  /  AlkPhos  106  02-02    LIVER FUNCTIONS - ( 02 Feb 2021 07:18 )  Alb: 2.9 g/dL / Pro: 6.7 g/dL / ALK PHOS: 106 U/L / ALT: 26 U/L DA / AST: 10 U/L / GGT: x                       CAPILLARY BLOOD GLUCOSE  CAPILLARY BLOOD GLUCOSE        CAPILLARY BLOOD GLUCOSE          RADIOLOGY & ADDITIONAL TESTS:

## 2021-02-02 NOTE — PROGRESS NOTE ADULT - PROBLEM SELECTOR PLAN 1
Pt p/w alterred mental status ,   on exam pt more oriented  D/D INCLUDE DEPRESSION VS infectious vs metabolic vs toxic vs dementia causes   urine toxicology neg   Neurology Dr. CONTRERAS  psychaitry Dr Mackey   PT [facility] home PT

## 2021-02-03 LAB
ALBUMIN SERPL ELPH-MCNC: 2.9 G/DL — LOW (ref 3.5–5)
ALP SERPL-CCNC: 101 U/L — SIGNIFICANT CHANGE UP (ref 40–120)
ALT FLD-CCNC: 28 U/L DA — SIGNIFICANT CHANGE UP (ref 10–60)
ANION GAP SERPL CALC-SCNC: 7 MMOL/L — SIGNIFICANT CHANGE UP (ref 5–17)
AST SERPL-CCNC: 14 U/L — SIGNIFICANT CHANGE UP (ref 10–40)
BILIRUB SERPL-MCNC: 1.1 MG/DL — SIGNIFICANT CHANGE UP (ref 0.2–1.2)
BUN SERPL-MCNC: 22 MG/DL — HIGH (ref 7–18)
CALCIUM SERPL-MCNC: 8 MG/DL — LOW (ref 8.4–10.5)
CHLORIDE SERPL-SCNC: 107 MMOL/L — SIGNIFICANT CHANGE UP (ref 96–108)
CO2 SERPL-SCNC: 25 MMOL/L — SIGNIFICANT CHANGE UP (ref 22–31)
CREAT SERPL-MCNC: 0.86 MG/DL — SIGNIFICANT CHANGE UP (ref 0.5–1.3)
GLUCOSE SERPL-MCNC: 86 MG/DL — SIGNIFICANT CHANGE UP (ref 70–99)
HCT VFR BLD CALC: 41.6 % — SIGNIFICANT CHANGE UP (ref 39–50)
HGB BLD-MCNC: 13.1 G/DL — SIGNIFICANT CHANGE UP (ref 13–17)
MAGNESIUM SERPL-MCNC: 2.3 MG/DL — SIGNIFICANT CHANGE UP (ref 1.6–2.6)
MCHC RBC-ENTMCNC: 22.4 PG — LOW (ref 27–34)
MCHC RBC-ENTMCNC: 31.5 GM/DL — LOW (ref 32–36)
MCV RBC AUTO: 71.1 FL — LOW (ref 80–100)
NRBC # BLD: 0 /100 WBCS — SIGNIFICANT CHANGE UP (ref 0–0)
PHOSPHATE SERPL-MCNC: 2.5 MG/DL — SIGNIFICANT CHANGE UP (ref 2.5–4.5)
PLATELET # BLD AUTO: 129 K/UL — LOW (ref 150–400)
POTASSIUM SERPL-MCNC: 3.8 MMOL/L — SIGNIFICANT CHANGE UP (ref 3.5–5.3)
POTASSIUM SERPL-SCNC: 3.8 MMOL/L — SIGNIFICANT CHANGE UP (ref 3.5–5.3)
PROT SERPL-MCNC: 6.7 G/DL — SIGNIFICANT CHANGE UP (ref 6–8.3)
RBC # BLD: 5.85 M/UL — HIGH (ref 4.2–5.8)
RBC # FLD: 14.9 % — HIGH (ref 10.3–14.5)
SARS-COV-2 RNA SPEC QL NAA+PROBE: SIGNIFICANT CHANGE UP
SODIUM SERPL-SCNC: 139 MMOL/L — SIGNIFICANT CHANGE UP (ref 135–145)
WBC # BLD: 7.65 K/UL — SIGNIFICANT CHANGE UP (ref 3.8–10.5)
WBC # FLD AUTO: 7.65 K/UL — SIGNIFICANT CHANGE UP (ref 3.8–10.5)

## 2021-02-03 RX ADMIN — Medication 40 MILLIGRAM(S): at 05:35

## 2021-02-03 RX ADMIN — TAMSULOSIN HYDROCHLORIDE 0.4 MILLIGRAM(S): 0.4 CAPSULE ORAL at 21:43

## 2021-02-03 RX ADMIN — FINASTERIDE 5 MILLIGRAM(S): 5 TABLET, FILM COATED ORAL at 10:59

## 2021-02-03 RX ADMIN — Medication 50 MILLIGRAM(S): at 16:27

## 2021-02-03 RX ADMIN — ESCITALOPRAM OXALATE 5 MILLIGRAM(S): 10 TABLET, FILM COATED ORAL at 10:59

## 2021-02-03 RX ADMIN — ENOXAPARIN SODIUM 40 MILLIGRAM(S): 100 INJECTION SUBCUTANEOUS at 10:59

## 2021-02-03 RX ADMIN — Medication 50 MILLIGRAM(S): at 05:35

## 2021-02-03 NOTE — PROGRESS NOTE ADULT - PROBLEM SELECTOR PLAN 1
Pt p/w alterred mental status ,   on exam pt more oriented  D/D INCLUDE DEPRESSION VS infectious vs metabolic vs toxic vs dementia causes   urine toxicology neg   Neurology Dr. CONTRERAS  psychaitry Dr Mackey  Pt recommended home PT but the family doesnot want to take the patient home and are requesting DWAINE ,  is aware.

## 2021-02-03 NOTE — PROGRESS NOTE ADULT - PROBLEM SELECTOR PROBLEM 1
Acute encephalopathy

## 2021-02-03 NOTE — PROGRESS NOTE ADULT - PROBLEM SELECTOR PLAN 4
RISK                                                          Points  [] Previous VTE                                           3  [] Thrombophilia                                        2  [] Lower limb paralysis                              2   [] Current Cancer                                       2   [x] Immobilization > 24 hrs                        1  [] ICU/CCU stay > 24 hours                       1  [x] Age > 60                                                   1    sc lovenox

## 2021-02-03 NOTE — PROGRESS NOTE ADULT - REASON FOR ADMISSION
altered mental status

## 2021-02-04 ENCOUNTER — TRANSCRIPTION ENCOUNTER (OUTPATIENT)
Age: 76
End: 2021-02-04

## 2021-02-04 VITALS
OXYGEN SATURATION: 97 % | TEMPERATURE: 98 F | HEART RATE: 60 BPM | DIASTOLIC BLOOD PRESSURE: 69 MMHG | RESPIRATION RATE: 18 BRPM | SYSTOLIC BLOOD PRESSURE: 140 MMHG

## 2021-02-04 LAB
HOMOCYSTEINE LEVEL: 10.3 UMOL/L — SIGNIFICANT CHANGE UP
METHYLMALONIC ACID LEVEL: 282 NMOL/L — SIGNIFICANT CHANGE UP (ref 87–318)

## 2021-02-04 PROCEDURE — 70450 CT HEAD/BRAIN W/O DYE: CPT

## 2021-02-04 PROCEDURE — 82746 ASSAY OF FOLIC ACID SERUM: CPT

## 2021-02-04 PROCEDURE — 85027 COMPLETE CBC AUTOMATED: CPT

## 2021-02-04 PROCEDURE — 83090 ASSAY OF HOMOCYSTEINE: CPT

## 2021-02-04 PROCEDURE — U0003: CPT

## 2021-02-04 PROCEDURE — 82140 ASSAY OF AMMONIA: CPT

## 2021-02-04 PROCEDURE — 36415 COLL VENOUS BLD VENIPUNCTURE: CPT

## 2021-02-04 PROCEDURE — 80053 COMPREHEN METABOLIC PANEL: CPT

## 2021-02-04 PROCEDURE — 83036 HEMOGLOBIN GLYCOSYLATED A1C: CPT

## 2021-02-04 PROCEDURE — 84443 ASSAY THYROID STIM HORMONE: CPT

## 2021-02-04 PROCEDURE — 85610 PROTHROMBIN TIME: CPT

## 2021-02-04 PROCEDURE — 82607 VITAMIN B-12: CPT

## 2021-02-04 PROCEDURE — 82962 GLUCOSE BLOOD TEST: CPT

## 2021-02-04 PROCEDURE — 99285 EMERGENCY DEPT VISIT HI MDM: CPT | Mod: 25

## 2021-02-04 PROCEDURE — 81003 URINALYSIS AUTO W/O SCOPE: CPT

## 2021-02-04 PROCEDURE — 85045 AUTOMATED RETICULOCYTE COUNT: CPT

## 2021-02-04 PROCEDURE — 97110 THERAPEUTIC EXERCISES: CPT

## 2021-02-04 PROCEDURE — 82150 ASSAY OF AMYLASE: CPT

## 2021-02-04 PROCEDURE — 84484 ASSAY OF TROPONIN QUANT: CPT

## 2021-02-04 PROCEDURE — 84550 ASSAY OF BLOOD/URIC ACID: CPT

## 2021-02-04 PROCEDURE — 86780 TREPONEMA PALLIDUM: CPT

## 2021-02-04 PROCEDURE — 83880 ASSAY OF NATRIURETIC PEPTIDE: CPT

## 2021-02-04 PROCEDURE — 97162 PT EVAL MOD COMPLEX 30 MIN: CPT

## 2021-02-04 PROCEDURE — U0005: CPT

## 2021-02-04 PROCEDURE — 97116 GAIT TRAINING THERAPY: CPT

## 2021-02-04 PROCEDURE — 86769 SARS-COV-2 COVID-19 ANTIBODY: CPT

## 2021-02-04 PROCEDURE — 82728 ASSAY OF FERRITIN: CPT

## 2021-02-04 PROCEDURE — 83615 LACTATE (LD) (LDH) ENZYME: CPT

## 2021-02-04 PROCEDURE — 80307 DRUG TEST PRSMV CHEM ANLYZR: CPT

## 2021-02-04 PROCEDURE — 85730 THROMBOPLASTIN TIME PARTIAL: CPT

## 2021-02-04 PROCEDURE — 84436 ASSAY OF TOTAL THYROXINE: CPT

## 2021-02-04 PROCEDURE — 87086 URINE CULTURE/COLONY COUNT: CPT

## 2021-02-04 PROCEDURE — 87635 SARS-COV-2 COVID-19 AMP PRB: CPT

## 2021-02-04 PROCEDURE — 85652 RBC SED RATE AUTOMATED: CPT

## 2021-02-04 PROCEDURE — 93005 ELECTROCARDIOGRAM TRACING: CPT

## 2021-02-04 PROCEDURE — 83550 IRON BINDING TEST: CPT

## 2021-02-04 PROCEDURE — 85025 COMPLETE CBC W/AUTO DIFF WBC: CPT

## 2021-02-04 PROCEDURE — 84100 ASSAY OF PHOSPHORUS: CPT

## 2021-02-04 PROCEDURE — 83735 ASSAY OF MAGNESIUM: CPT

## 2021-02-04 PROCEDURE — 80061 LIPID PANEL: CPT

## 2021-02-04 PROCEDURE — 83921 ORGANIC ACID SINGLE QUANT: CPT

## 2021-02-04 PROCEDURE — 83540 ASSAY OF IRON: CPT

## 2021-02-04 PROCEDURE — 71045 X-RAY EXAM CHEST 1 VIEW: CPT

## 2021-02-04 PROCEDURE — 97530 THERAPEUTIC ACTIVITIES: CPT

## 2021-02-04 RX ADMIN — Medication 50 MILLIGRAM(S): at 17:15

## 2021-02-04 RX ADMIN — ESCITALOPRAM OXALATE 5 MILLIGRAM(S): 10 TABLET, FILM COATED ORAL at 11:34

## 2021-02-04 RX ADMIN — Medication 40 MILLIGRAM(S): at 05:12

## 2021-02-04 RX ADMIN — Medication 50 MILLIGRAM(S): at 05:12

## 2021-02-04 RX ADMIN — FINASTERIDE 5 MILLIGRAM(S): 5 TABLET, FILM COATED ORAL at 11:34

## 2021-02-04 RX ADMIN — ENOXAPARIN SODIUM 40 MILLIGRAM(S): 100 INJECTION SUBCUTANEOUS at 11:34

## 2021-02-04 NOTE — DIETITIAN INITIAL EVALUATION ADULT. - OTHER INFO
PT visited. Pt OOB to chair. Pt seen for LOS. Observed Pt eating Lunch Pt eating slowly.  Pt reports eating good for B fast. Food choices updated. Likes chocolate Ensure. Pt able to speak  Dominican AND Palestinian.  NKFA. Pt admitted with AMS. PER PT  HT  5 FET 7 INCHES,  LB . PT REPORTS NO WEIGHT LOSS. D/W RN Pt eating good. Po intake 100 % of meal per NSG flow sheet.

## 2021-02-04 NOTE — DISCHARGE NOTE NURSING/CASE MANAGEMENT/SOCIAL WORK - PATIENT PORTAL LINK FT
You can access the FollowMyHealth Patient Portal offered by [facility] by registering at the following website: [email or web address]. By joining [facility]’s FollowMyHealth portal, you will also be able to view your health information using other applications (apps) compatible with our system.

## 2021-02-04 NOTE — DIETITIAN INITIAL EVALUATION ADULT. - CONTINUE CURRENT NUTRITION CARE PLAN
Message attached was copied into 10/5/20 e-advice encounter for continuity.  Closing this encounter.    yes

## 2021-02-04 NOTE — DIETITIAN INITIAL EVALUATION ADULT. - PERTINENT MEDS FT
MEDICATIONS  (STANDING):  enalapril 40 milliGRAM(s) Oral daily  enoxaparin Injectable 40 milliGRAM(s) SubCutaneous daily  escitalopram 5 milliGRAM(s) Oral daily  finasteride 5 milliGRAM(s) Oral daily  metoprolol tartrate 50 milliGRAM(s) Oral two times a day  sodium chloride 0.9%. 1000 milliLiter(s) (70 mL/Hr) IV Continuous <Continuous>  tamsulosin 0.4 milliGRAM(s) Oral at bedtime    MEDICATIONS  (PRN):

## 2021-02-04 NOTE — DIETITIAN INITIAL EVALUATION ADULT. - PERTINENT LABORATORY DATA
02-03 Na139 mmol/L Glu 86 mg/dL K+ 3.8 mmol/L Cr  0.86 mg/dL BUN 22 mg/dL<H>   02-03 Phos 2.5 mg/dL   02-03 Alb 2.9 g/dL<L>       01-28 Chol 160 mg/dL LDL --    HDL 39 mg/dL<L> Trig 78 mg/dL  01-28-21 @ 23:48 HgbA1C 5.7 [4.0 - 5.6]

## 2021-04-07 ENCOUNTER — OUTPATIENT (OUTPATIENT)
Dept: OUTPATIENT SERVICES | Facility: HOSPITAL | Age: 76
LOS: 1 days | End: 2021-04-07
Payer: MEDICARE

## 2021-04-07 ENCOUNTER — APPOINTMENT (OUTPATIENT)
Dept: MRI IMAGING | Facility: CLINIC | Age: 76
End: 2021-04-07
Payer: MEDICARE

## 2021-04-07 DIAGNOSIS — Z98.49 CATARACT EXTRACTION STATUS, UNSPECIFIED EYE: Chronic | ICD-10-CM

## 2021-04-07 DIAGNOSIS — Z00.8 ENCOUNTER FOR OTHER GENERAL EXAMINATION: ICD-10-CM

## 2021-04-07 DIAGNOSIS — M48.00 SPINAL STENOSIS, SITE UNSPECIFIED: Chronic | ICD-10-CM

## 2021-04-07 PROCEDURE — 70551 MRI BRAIN STEM W/O DYE: CPT

## 2021-04-07 PROCEDURE — 70551 MRI BRAIN STEM W/O DYE: CPT | Mod: 26

## 2021-04-19 NOTE — DIETITIAN INITIAL EVALUATION ADULT. - PROBLEM SELECTOR PROBLEM 3
Patient : Stu Steinberg Age: 19 year old Sex: female   MRN: [id number] Encounter Date: 4/19/2021    G25/G25    History     Chief Complaint   Patient presents with   • Flank Pain   • Headache New Onset on New Symptom     HPI  4/19/2021  1:30 PM Stu Steinberg is a 19 year old female with a hx of kidney stones who presents to the ED for evaluation of a constant right flank pain at her back for the past three days. She describes the pain as the same as when she had her kidney stones before. She had a stent placed two weeks ago and also removed. After the procedure, the patient's pain went away. She made an appointment with Urology as instructed but the pain returned. When she sleeps, the patient's pain worsens. She also reports urinary frequency, nausea, and vomiting. She denies any fever, hematuria, or dysuria. She is not on any medication. She has been taking ibuprofen with no relief. There are no further complaints or modifying factors at this time.    PCP: ANAND Canela    Allergies   Allergen Reactions   • Ceftriaxone HIVES, RASH and THROAT SWELLING       Current Discharge Medication List      Prior to Admission Medications    Details   NAPROXEN PO       Acne Medication 5 5 % gel APPLY EXTERNALLY TO THE AFFECTED AREA EVERY DAY      benzoyl peroxide 5 % external liquid benzoyl peroxide 5 % topical cleanser      oxybutynin (DITROPAN) 5 MG tablet Take 1 tablet by mouth 3 times daily for 14 days.  Qty: 42 tablet, Refills: 0      acetaminophen (TYLENOL) 500 MG tablet Take 2 tablets by mouth every 8 hours.  Qty:        ibuprofen (MOTRIN) 800 MG tablet Take 800 mg by mouth every 8 hours as needed. Stop pre op 3/23/21.  DOS 3/29/21      clindamycin (CLINDAGEL) 1 % gel APPLY THIN LAYER EXTERNALLY TO THE AFFECTED AREA TWICE DAILY      benzoyl peroxide 5 % external wash Apply 1 application topically nightly.       ferrous sulfate 325 (65 FE) MG tablet Take 1 tablet by mouth 2 times daily.  Qty: 60  tablet, Refills: 0      albuterol (PROAIR HFA) 108 (90 Base) MCG/ACT inhaler every 4 hours.             Past Medical History:   Diagnosis Date   • Acute UTI 03/18/2021   • Anemia    • Asthma    • Calculus of kidney and ureter    • Iron deficiency anemia    • RAD (reactive airway disease)    • Ureteral stone        Past Surgical History:   Procedure Laterality Date   • CYSTOSCOPY Right 03/19/2021    with right stent       Family History   Problem Relation Age of Onset   • Hypertension Father         possible-noted overseas.   • Diabetes Father        Social History     Tobacco Use   • Smoking status: Never Smoker   • Smokeless tobacco: Never Used   • Tobacco comment: not able to discuss privately. Please f/u.   Substance Use Topics   • Alcohol use: No     Alcohol/week: 0.0 standard drinks     Comment: not able to discuss privately.   • Drug use: No     Comment: not able to discuss privately.       E-cigarette/Vaping   • E-Cigarette/Vaping Use Never Used      E-Cigarette/Vaping Substances & Devices   • Nicotine No    • THC No    • CBD No    • Flavoring No    • Disposable No    • Pre-filled or Refillable Cartridge No    • Refillable Tank No    • Pre-filled Pod No        Review of Systems   Constitutional: Negative for chills and fever.   HENT: Negative for congestion and sore throat.    Eyes: Negative for visual disturbance.   Respiratory: Negative for cough and shortness of breath.    Cardiovascular: Negative for chest pain and leg swelling.   Gastrointestinal: Positive for nausea and vomiting. Negative for abdominal pain and diarrhea.   Genitourinary: Positive for flank pain (right sided) and frequency. Negative for dysuria and hematuria.   Musculoskeletal: Positive for back pain (right lower).   Skin: Negative for rash.   Neurological: Negative for headaches.   Hematological: Does not bruise/bleed easily.       Physical Exam     ED Triage Vitals [04/19/21 1250]   ED Triage Vitals Group      Temp 98.2 °F (36.8 °C)       Heart Rate (!) 107      Resp 18      /79      SpO2 100 %      EtCO2 mmHg       Height       Weight       Weight Scale Used       BMI (Calculated)       IBW/kg (Calculated)        Physical Exam   Constitutional: She appears well-developed and well-nourished.   HENT:   Head: Atraumatic.   Eyes: Pupils are equal, round, and reactive to light. EOM are normal.   Cardiovascular: Normal rate and regular rhythm.   Pulmonary/Chest: Breath sounds normal. No respiratory distress. She has no wheezes.   Abdominal: Soft. There is no abdominal tenderness.   Right flank pain   Musculoskeletal:         General: No edema.      Cervical back: Normal range of motion.   Neurological: She is alert.   Skin: Skin is warm and dry.   Psychiatric: She has a normal mood and affect.   Nursing note and vitals reviewed.      ED Course     Procedures    Lab Results     Results for orders placed or performed during the hospital encounter of 04/19/21   Comprehensive Metabolic Panel   Result Value Ref Range    Fasting Status      Sodium 140 135 - 145 mmol/L    Potassium 4.1 3.4 - 5.1 mmol/L    Chloride 108 (H) 98 - 107 mmol/L    Carbon Dioxide 25 21 - 32 mmol/L    Anion Gap 11 10 - 20 mmol/L    Glucose 97 65 - 99 mg/dL    BUN 7 6 - 20 mg/dL    Creatinine 0.57 0.51 - 0.95 mg/dL    Glomerular Filtration Rate >90 >90 mL/min/1.73m2    BUN/ Creatinine Ratio 12 7 - 25    Calcium 9.5 8.4 - 10.2 mg/dL    Bilirubin, Total 0.5 0.2 - 1.0 mg/dL    GOT/AST 22 <=37 Units/L    GPT/ALT 31 <64 Units/L    Alkaline Phosphatase 96 42 - 110 Units/L    Albumin 4.2 3.6 - 5.1 g/dL    Protein, Total 8.5 (H) 6.4 - 8.2 g/dL    Globulin 4.3 (H) 2.0 - 4.0 g/dL    A/G Ratio 1.0 1.0 - 2.4   CBC with Automated Differential (performable only)   Result Value Ref Range    WBC 5.8 4.2 - 11.0 K/mcL    RBC 5.11 4.00 - 5.20 mil/mcL    HGB 12.9 12.0 - 15.5 g/dL    HCT 41.0 36.0 - 46.5 %    MCV 80.2 78.0 - 100.0 fl    MCH 25.2 (L) 26.0 - 34.0 pg    MCHC 31.5 (L) 32.0 - 36.5 g/dL     RDW-CV 17.5 (H) 11.0 - 15.0 %    RDW-SD 51.2 (H) 39.0 - 50.0 fL     140 - 450 K/mcL    NRBC 0 <=0 /100 WBC    Neutrophil, Percent 62 %    Lymphocytes, Percent 31 %    Mono, Percent 5 %    Eosinophils, Percent 2 %    Basophils, Percent 0 %    Immature Granulocytes 0 %    Absolute Neutrophils 3.6 1.8 - 8.0 K/mcL    Absolute Lymphocytes 1.8 1.2 - 5.2 K/mcL    Absolute Monocytes 0.3 0.3 - 0.9 K/mcL    Absolute Eosinophils  0.1 0.0 - 0.5 K/mcL    Absolute Basophils 0.0 0.0 - 0.3 K/mcL    Absolute Immmature Granulocytes 0.0 0.0 - 0.2 K/mcL   ISTAT BETA HCG - POINT OF CARE   Result Value Ref Range    ISTAT BETA HCG - POINT OF CARE <5.0 <5.0 IU/L       EKG Results   There are no EKG results to report from this visit.  Radiology Results     Imaging Results          CT Abd Pelvis for KidneyStones WO Contra (Final result)  Result time 04/19/21 15:49:29    Final result                 Impression:    IMPRESSION:    1.  No acute inflammation in the abdomen or pelvis. No renal stones or  hydronephrosis.  2.  Normal appendix.    I, Attending Radiologist Julius Goode MD, have reviewed the images and  report and concur with these findings interpreted by Resident Radiologist,  Mary Avila MD.              Narrative:    CT ABDOMEN PELVIS FOR KIDNEY STONES WO CONTRAST    HISTORY: 19-year-old female with Flank pain, kidney stone suspected, flank  pain    COMPARISON: 3/18/2021.    TECHNIQUE:  Axial CT images of the abdomen and pelvis without contrast.  Multiplanar reformats.    FINDINGS:    Examination of the visceral organs is limited by the lack of oral or IV  contrast.     GENITOURINARY:     Kidneys/ureters: No hydronephrosis or hydroureter. No renal or ureter  stones. No perinephric fat stranding.     Bladder: The urinary bladder is normal.    LOWER CHEST:    Lung bases: Negative.    Heart:  Negative.    ABDOMEN:    Liver:  Negative.    Biliary system:  Negative.    Spleen:  Negative.    Pancreas:  Negative.    Adrenal  glands:  Negative.    Bowel: No evidence for bowel obstruction. The appendix is normal.    Retroperitoneum/mesentery:  Negative.    Vascular:  Negative.    Osseous structures/subcutaneous tissues:  Negative.    PELVIS:     Mild amount of physiologic pelvic free fluid.                                ED Medication Orders (From admission, onward)    Ordered Start     Status Ordering Provider    04/19/21 1532 04/19/21 1533  sodium chloride (NORMAL SALINE) 0.9 % bolus 500 mL  ONCE      Last MAR action: Completed SNOWMARYXENA CHANEY SAURABH    04/19/21 1340 04/19/21 1341  ketorolac injection 15 mg  ONCE      Last MAR action: Given XENA WAGNER SAURABH    04/19/21 1250 04/19/21 1249  morphine injection 4 mg  EVERY 30 MIN PRN      Acknowledged JOSEFINA CORDOVA               [facility]  Vitals  Vitals:    04/19/21 1250 04/19/21 1600   BP: 122/79 117/75   BP Location: RUE - Right upper extremity RUE - Right upper extremity   Patient Position: Sitting Semi-Soliman's   Pulse: (!) 107 88   Resp: 18 14   Temp: 98.2 °F (36.8 °C)    TempSrc: Oral    SpO2: 100% 98%       ED Course  4:02 PM   I rechecked with the patient who is resting in bed. Pain has resolved. I informed her that her CT larry showed no stone or hydronephrosis. I have requested urine for an UA to see if there is an infection in her bladder and patient declined. She says she was checked a week ago and that she was on abx for a while. I discussed the importance but pt doesn't think that she has an infection. She prefers to go. I advised the pt to return to the ED for any new or worsening sx including fever or increased pain. The pt understands and agrees with the plan. All questions answered.      Critical Care time spent on this patient outside of billable procedures:  None    Clinical Impression  ED Diagnosis        Final diagnosis    Flank pain, acute                Follow Up:  ANAND Canela  [address] [facility] [address] [phone]    In 3 days  As  needed    Deric Rojo MD  [address]  [address]  [facility] [address]  [phone]    In 3 days  As needed          Summary of your Discharge Medications      You have not been prescribed any medications.         Pt is discharged to home/self care in stable condition.       I have reviewed the information recorded by the scribe for accuracy and agree with its contents.    ____________________________________________________________________    Philip Chakraborty acting as a scribe for Dr. Jose Alejandro Paez  Dictation # [id number]  Scribe: Philip Paez MD  04/19/21 1729     HTN (hypertension)

## 2021-06-09 ENCOUNTER — APPOINTMENT (OUTPATIENT)
Dept: SPINE | Facility: CLINIC | Age: 76
End: 2021-06-09
Payer: MEDICARE

## 2021-06-09 VITALS
HEART RATE: 57 BPM | OXYGEN SATURATION: 92 % | TEMPERATURE: 98 F | DIASTOLIC BLOOD PRESSURE: 102 MMHG | SYSTOLIC BLOOD PRESSURE: 158 MMHG | HEIGHT: 67 IN | BODY MASS INDEX: 29.51 KG/M2 | WEIGHT: 188 LBS

## 2021-06-09 DIAGNOSIS — G91.2 (IDIOPATHIC) NORMAL PRESSURE HYDROCEPHALUS: ICD-10-CM

## 2021-06-09 PROCEDURE — 99213 OFFICE O/P EST LOW 20 MIN: CPT

## 2021-06-09 PROCEDURE — 99072 ADDL SUPL MATRL&STAF TM PHE: CPT

## 2021-06-09 RX ORDER — HYDROCHLOROTHIAZIDE 25 MG/1
25 TABLET ORAL
Refills: 0 | Status: DISCONTINUED | COMMUNITY
End: 2021-06-09

## 2021-06-09 RX ORDER — TIZANIDINE HYDROCHLORIDE 4 MG/1
4 CAPSULE ORAL EVERY 8 HOURS
Qty: 45 | Refills: 0 | Status: DISCONTINUED | COMMUNITY
Start: 2017-10-16 | End: 2021-06-09

## 2021-06-09 RX ORDER — FINASTERIDE 5 MG/1
5 TABLET, FILM COATED ORAL
Refills: 0 | Status: DISCONTINUED | COMMUNITY
End: 2021-06-09

## 2021-06-09 RX ORDER — ASPIRIN 81 MG/1
81 TABLET, CHEWABLE ORAL
Refills: 0 | Status: DISCONTINUED | COMMUNITY
End: 2021-06-09

## 2021-06-09 RX ORDER — TAMSULOSIN HYDROCHLORIDE 0.4 MG/1
0.4 CAPSULE ORAL
Refills: 0 | Status: DISCONTINUED | COMMUNITY
End: 2021-06-09

## 2021-06-09 NOTE — PHYSICAL EXAM
[General Appearance - Alert] : alert [General Appearance - In No Acute Distress] : in no acute distress [General Appearance - Well Nourished] : well nourished [Oriented To Time, Place, And Person] : oriented to person, place, and time [Impaired Insight] : insight and judgment were intact [Affect] : the affect was normal [Person] : oriented to person [Place] : oriented to place [Time] : oriented to time [Short Term Intact] : short term memory intact [Remote Intact] : remote memory intact [Registration Intact] : recent registration memory intact [Span Intact] : the attention span was normal [Concentration Intact] : normal concentrating ability [Visual Intact] : visual attention was ~T not ~L decreased [Fluency] : fluency intact [Comprehension] : comprehension intact [Reading] : reading intact [Current Events] : adequate knowledge of current events [Past History] : adequate knowledge of personal past history [Vocabulary] : adequate range of vocabulary [Cranial Nerves Vestibulocochlear (VIII)] : hearing was intact bilaterally [Cranial Nerves Accessory (XI - Cranial And Spinal)] : head turning and shoulder shrug symmetric [Motor Tone] : muscle tone was normal in all four extremities [Motor Strength] : muscle strength was normal in all four extremities [Sensation Tactile Decrease] : light touch was intact [2+] : Ankle jerk left 2+ [FreeTextEntry8] : ataxic gait  [Full ROM] : full ROM [Able to toe walk] : the patient was not able to toe walk [Able to heel walk] : the patient was not able to heel walk [Intact] : all sensory within normal limits bilaterally [Sclera] : the sclera and conjunctiva were normal [Outer Ear] : the ears and nose were normal in appearance [Hearing Threshold Finger Rub Not [place]] : hearing was normal [Neck Appearance] : the appearance of the neck was normal [] : no respiratory distress [FreeTextEntry1] : ataxic gait [Skin Color & Pigmentation] : normal skin color and pigmentation

## 2021-06-09 NOTE — DATA REVIEWED
[de-identified] : CT brain from [facility] 10/21/2019  [de-identified] : Brain MRI  from [facility] 4/2021

## 2021-06-09 NOTE — REVIEW OF SYSTEMS
[Memory Lapses or Loss] : memory loss [Difficulty Walking] : difficulty walking [Negative] : Heme/Lymph [de-identified] : gait imbalance

## 2021-06-09 NOTE — HISTORY OF PRESENT ILLNESS
[> 3 months] : more  than 3 months [FreeTextEntry1] : Worsening gait  [de-identified] : \par \par 75 year old male here for a suspicion for NPH and memory changes about one year ago.  He has  a known history of NPH and in 2019.  A VPS was placed by Emilio King MD  permanently  in 2019.   Today he reports with worsening symptoms, especially his balance and gait.  Since January he has markedly declined and his walking has diminished,  He has rare urinary incontinence. and gait instability.    He reports using a walker routinely.   He presently is in PT .

## 2021-06-09 NOTE — REASON FOR VISIT
[New Patient Visit] : a new patient visit [Referred By: _________] : Patient was referred by MARCOS [Spouse] : spouse [Family Member] : family member [FreeTextEntry1] : Rule out NPH

## 2021-06-09 NOTE — ASSESSMENT
[FreeTextEntry1] : \par \par S/P VPS for suspicion for NPH in 2019.  Worsening gait.  It is unclear what VPS was placed and if any prior work up for NPH was complete.  It was suggested that he return to Dr Courtney or Dr. Marques to have an assessment and the VPS information will be available.   \par \par \par CC:\par Sarah Garcia MD

## 2021-06-12 NOTE — H&P PST ADULT - BLOOD TRANSFUSION, PREVIOUS, PROFILE
no This patient is an 83 year old woman ESRD on dialysis who presents to the ER for evaluation after she had an unresponsive episode.  Patient's daughter was called and states that patient was sitting in her chair after breakfast at around 9:30 am when her head tilted back and she was unresponsive.  Her daughter states that her mouth was "funny" but she denies a one sided facial droop.  Patient has no complaints.  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  Patient is due for dialysis today.   in the Ed patient is at her baseline.

## 2021-10-17 NOTE — CONSULT NOTE ADULT - CONSULT REQUESTED DATE/TIME
29-Jan-2021 13:36 PAST MEDICAL HISTORY:  No pertinent past medical history     Pulmonary embolism

## 2022-01-01 ENCOUNTER — NON-APPOINTMENT (OUTPATIENT)
Age: 77
End: 2022-01-01

## 2022-01-13 ENCOUNTER — TRANSCRIPTION ENCOUNTER (OUTPATIENT)
Age: 77
End: 2022-01-13

## 2022-02-08 ENCOUNTER — APPOINTMENT (OUTPATIENT)
Dept: HEPATOLOGY | Facility: CLINIC | Age: 77
End: 2022-02-08
Payer: MEDICARE

## 2022-02-08 VITALS
BODY MASS INDEX: 28.72 KG/M2 | TEMPERATURE: 97.7 F | SYSTOLIC BLOOD PRESSURE: 142 MMHG | WEIGHT: 183 LBS | DIASTOLIC BLOOD PRESSURE: 90 MMHG | HEIGHT: 67 IN

## 2022-02-08 DIAGNOSIS — K76.9 LIVER DISEASE, UNSPECIFIED: ICD-10-CM

## 2022-02-08 PROCEDURE — 99204 OFFICE O/P NEW MOD 45 MIN: CPT

## 2022-02-08 RX ORDER — MULTIVITAMIN
TABLET ORAL
Refills: 0 | Status: ACTIVE | COMMUNITY

## 2022-02-08 RX ORDER — ESCITALOPRAM OXALATE 10 MG/1
10 TABLET ORAL
Qty: 30 | Refills: 0 | Status: ACTIVE | COMMUNITY
Start: 2021-12-21

## 2022-02-08 RX ORDER — FINASTERIDE 1 MG/1
TABLET ORAL
Refills: 0 | Status: ACTIVE | COMMUNITY

## 2022-02-08 NOTE — ASSESSMENT
[FreeTextEntry1] : Mr. Simón Nava is 76 year old Male without prior history of liver disease now with an incidental finding of a new 4.5 cm indeterminate lesion without enhancement in seg 5/6 on recent CT urogram. I recommended a MRI with and without contrast which patient has been approved by his insurance. [facility] [place] [facility] and arrangement for right frontal shunt cath to be shut for the test. Labs ordered today for AFP level and to rule out common causes of chronic liver disease. I would like to see pt back after MRI is done. Daughter will obtain a copy of disc for review. \par

## 2022-02-08 NOTE — HISTORY OF PRESENT ILLNESS
[de-identified] : Mr. Simón Nava is 76 year old Male with h/o BPH, HTN, right frontal shunt cath locke hydrocephalus was here for evaluation of a liver lesion. He is accompany by his daughter who provided medical history. Patient been experiencing urinary retention and microhematuria, had CT urogram of abdo/pelvis in Jan 2022 and had an incidental finding of a new 4.5 cm indeterminate lesion without enhancement in seg 5/6. His insurance has approved abdo MRI and [facility] and arrangement for right frontal shunt cath to be shut for the test. No known prior history of liver disease and states that his liver tests have been fine in the past. \par \par He has never had clinical hepatitis or jaundice. He currently feels well. Denies abdominal pain, nausea, vomiting, melena, hematochezia, or hematemesis.\par \par No known family history of liver disease or liver cancer. He drinks alcohol occasionally. \par \par Daughter doesn't remember meds pt taking and will bring a list at next visit. \par \par Reviewed of records showed abdo MRI 10/8/17 revealed 4 cystic lesions on pancreas and small hepatic cysts. No recent imaging to evaluate cystic lesions on pancreas.

## 2022-02-08 NOTE — PHYSICAL EXAM
[Scleral Icterus] : No Scleral Icterus [Abdominal  Ascites] : no ascites [Asterixis] : no asterixis observed [Jaundice] : No jaundice [Palmar Erythema] : no Palmar Erythema [General Appearance - Alert] : alert [General Appearance - In No Acute Distress] : in no acute distress [Sclera] : the sclera and conjunctiva were normal [Outer Ear] : the ears and nose were normal in appearance [Neck Appearance] : the appearance of the neck was normal [Neck Cervical Mass (___cm)] : no neck mass was observed [Respiration, Rhythm And Depth] : normal respiratory rhythm and effort [Auscultation Breath Sounds / Voice Sounds] : lungs were clear to auscultation bilaterally [Heart Rate And Rhythm] : heart rate was normal and rhythm regular [Heart Sounds] : normal S1 and S2 [Murmurs] : no murmurs [Edema] : there was no peripheral edema [Bowel Sounds] : normal bowel sounds [Abdomen Soft] : soft [Abdomen Tenderness] : non-tender [] : no hepato-splenomegaly [Abdomen Mass (___ Cm)] : no abdominal mass palpated [Nail Clubbing] : no clubbing  or cyanosis of the fingernails [Skin Turgor] : normal skin turgor [Oriented To Time, Place, And Person] : oriented to person, place, and time [Affect] : the affect was normal

## 2022-02-08 NOTE — REASON FOR VISIT
[Initial Evaluation] : an initial evaluation [Family Member] : family member [FreeTextEntry1] : liver lesion

## 2022-02-09 LAB
AFP-TM SERPL-MCNC: 2.9 NG/ML
ALBUMIN SERPL ELPH-MCNC: 3.9 G/DL
ALP BLD-CCNC: 127 U/L
ALT SERPL-CCNC: 28 U/L
ANION GAP SERPL CALC-SCNC: 13 MMOL/L
AST SERPL-CCNC: 17 U/L
BASOPHILS # BLD AUTO: 0.06 K/UL
BASOPHILS NFR BLD AUTO: 0.6 %
BILIRUB SERPL-MCNC: 1 MG/DL
BUN SERPL-MCNC: 20 MG/DL
CALCIUM SERPL-MCNC: 9.2 MG/DL
CERULOPLASMIN SERPL-MCNC: 29 MG/DL
CHLORIDE SERPL-SCNC: 105 MMOL/L
CO2 SERPL-SCNC: 22 MMOL/L
CREAT SERPL-MCNC: 0.92 MG/DL
EOSINOPHIL # BLD AUTO: 0.2 K/UL
EOSINOPHIL NFR BLD AUTO: 2.1 %
HBV CORE IGG+IGM SER QL: NONREACTIVE
HBV SURFACE AB SER QL: NONREACTIVE
HBV SURFACE AG SER QL: NONREACTIVE
HCT VFR BLD CALC: 40.8 %
HCV AB SER QL: NONREACTIVE
HCV S/CO RATIO: 0.15 S/CO
HGB BLD-MCNC: 12.6 G/DL
IGA SER QL IEP: 407 MG/DL
IGG SER QL IEP: 1656 MG/DL
IGM SER QL IEP: 93 MG/DL
IMM GRANULOCYTES NFR BLD AUTO: 0.4 %
INR PPP: 1.07 RATIO
LYMPHOCYTES # BLD AUTO: 2 K/UL
LYMPHOCYTES NFR BLD AUTO: 20.8 %
MAN DIFF?: NORMAL
MCHC RBC-ENTMCNC: 22 PG
MCHC RBC-ENTMCNC: 30.9 GM/DL
MCV RBC AUTO: 71.3 FL
MONOCYTES # BLD AUTO: 0.71 K/UL
MONOCYTES NFR BLD AUTO: 7.4 %
NEUTROPHILS # BLD AUTO: 6.6 K/UL
NEUTROPHILS NFR BLD AUTO: 68.7 %
PLATELET # BLD AUTO: 189 K/UL
POTASSIUM SERPL-SCNC: 4.3 MMOL/L
PROT SERPL-MCNC: 7.1 G/DL
PT BLD: 12.6 SEC
RBC # BLD: 5.72 M/UL
RBC # FLD: 15.6 %
SODIUM SERPL-SCNC: 139 MMOL/L
WBC # FLD AUTO: 9.61 K/UL

## 2022-02-10 LAB
ANA PAT FLD IF-IMP: ABNORMAL
ANA SER IF-ACNC: ABNORMAL

## 2022-02-11 ENCOUNTER — TRANSCRIPTION ENCOUNTER (OUTPATIENT)
Age: 77
End: 2022-02-11

## 2022-02-11 LAB
LKM AB SER QL IF: <20.1 UNITS
MITOCHONDRIA AB SER IF-ACNC: NORMAL
SMOOTH MUSCLE AB SER QL IF: ABNORMAL

## 2022-02-15 LAB — SOLUBLE LIVER IGG SER IA-ACNC: 2.4

## 2022-02-25 NOTE — H&P PST ADULT - HEALTH CARE MAINTENANCE
[facility] Call Center    Phone Message    May a detailed message be left on voicemail: yes     Reason for Call: Other:      Pt is reqiesting a call back to discuss the steps she needs to take to complete the previous prescription, referrals and orders submitted by Aimee Nava.    Action Taken: Message routed to:  [facility] Gastro    Travel Screening: Not Applicable                                                                      
Spoke with Carolina and she did not have the number to schedule the CT-number provided.   Provided direction on how to do the stool  study.  She is gong to go the [facility] location for the collection container and bring the stool sample back there.    She needed a refill of the imodium.  Order sent   
colonoscopy: 3 years ago wnl   PNA vaccine 10/201/19   flu vaccine 10/2019

## 2022-03-22 ENCOUNTER — TRANSCRIPTION ENCOUNTER (OUTPATIENT)
Age: 77
End: 2022-03-22

## 2022-04-11 ENCOUNTER — TRANSCRIPTION ENCOUNTER (OUTPATIENT)
Age: 77
End: 2022-04-11

## 2022-04-22 ENCOUNTER — TRANSCRIPTION ENCOUNTER (OUTPATIENT)
Age: 77
End: 2022-04-22

## 2023-01-01 ENCOUNTER — TRANSCRIPTION ENCOUNTER (OUTPATIENT)
Age: 78
End: 2023-01-01

## 2023-01-01 ENCOUNTER — INPATIENT (INPATIENT)
Facility: HOSPITAL | Age: 78
LOS: 12 days | DRG: 947 | End: 2023-05-14
Attending: INTERNAL MEDICINE | Admitting: INTERNAL MEDICINE
Payer: MEDICARE

## 2023-01-01 ENCOUNTER — EMERGENCY (EMERGENCY)
Facility: HOSPITAL | Age: 78
LOS: 1 days | Discharge: HOME | End: 2023-01-01
Attending: STUDENT IN AN ORGANIZED HEALTH CARE EDUCATION/TRAINING PROGRAM
Payer: MEDICARE

## 2023-01-01 VITALS
HEART RATE: 117 BPM | TEMPERATURE: 101 F | DIASTOLIC BLOOD PRESSURE: 55 MMHG | OXYGEN SATURATION: 82 % | SYSTOLIC BLOOD PRESSURE: 119 MMHG | RESPIRATION RATE: 20 BRPM

## 2023-01-01 VITALS
RESPIRATION RATE: 18 BRPM | HEART RATE: 84 BPM | DIASTOLIC BLOOD PRESSURE: 91 MMHG | TEMPERATURE: 99 F | SYSTOLIC BLOOD PRESSURE: 152 MMHG | OXYGEN SATURATION: 96 %

## 2023-01-01 VITALS
HEART RATE: 81 BPM | SYSTOLIC BLOOD PRESSURE: 158 MMHG | RESPIRATION RATE: 16 BRPM | DIASTOLIC BLOOD PRESSURE: 92 MMHG | TEMPERATURE: 97 F | OXYGEN SATURATION: 95 %

## 2023-01-01 VITALS
WEIGHT: 166.01 LBS | DIASTOLIC BLOOD PRESSURE: 85 MMHG | HEIGHT: 66 IN | TEMPERATURE: 98 F | SYSTOLIC BLOOD PRESSURE: 137 MMHG | HEART RATE: 102 BPM | OXYGEN SATURATION: 92 % | RESPIRATION RATE: 20 BRPM

## 2023-01-01 DIAGNOSIS — E43 UNSPECIFIED SEVERE PROTEIN-CALORIE MALNUTRITION: ICD-10-CM

## 2023-01-01 DIAGNOSIS — N40.0 BENIGN PROSTATIC HYPERPLASIA WITHOUT LOWER URINARY TRACT SYMPTOMS: ICD-10-CM

## 2023-01-01 DIAGNOSIS — Z98.49 CATARACT EXTRACTION STATUS, UNSPECIFIED EYE: Chronic | ICD-10-CM

## 2023-01-01 DIAGNOSIS — Z02.9 ENCOUNTER FOR ADMINISTRATIVE EXAMINATIONS, UNSPECIFIED: ICD-10-CM

## 2023-01-01 DIAGNOSIS — Z29.9 ENCOUNTER FOR PROPHYLACTIC MEASURES, UNSPECIFIED: ICD-10-CM

## 2023-01-01 DIAGNOSIS — Z51.5 ENCOUNTER FOR PALLIATIVE CARE: ICD-10-CM

## 2023-01-01 DIAGNOSIS — R52 PAIN, UNSPECIFIED: ICD-10-CM

## 2023-01-01 DIAGNOSIS — C18.9 MALIGNANT NEOPLASM OF COLON, UNSPECIFIED: ICD-10-CM

## 2023-01-01 DIAGNOSIS — R26.2 DIFFICULTY IN WALKING, NOT ELSEWHERE CLASSIFIED: ICD-10-CM

## 2023-01-01 DIAGNOSIS — G89.3 NEOPLASM RELATED PAIN (ACUTE) (CHRONIC): ICD-10-CM

## 2023-01-01 DIAGNOSIS — E78.5 HYPERLIPIDEMIA, UNSPECIFIED: ICD-10-CM

## 2023-01-01 DIAGNOSIS — F03.C0 UNSPECIFIED DEMENTIA, SEVERE, WITHOUT BEHAVIORAL DISTURBANCE, PSYCHOTIC DISTURBANCE, MOOD DISTURBANCE, AND ANXIETY: ICD-10-CM

## 2023-01-01 DIAGNOSIS — M48.00 SPINAL STENOSIS, SITE UNSPECIFIED: Chronic | ICD-10-CM

## 2023-01-01 DIAGNOSIS — I48.0 PAROXYSMAL ATRIAL FIBRILLATION: ICD-10-CM

## 2023-01-01 DIAGNOSIS — F32.9 MAJOR DEPRESSIVE DISORDER, SINGLE EPISODE, UNSPECIFIED: ICD-10-CM

## 2023-01-01 DIAGNOSIS — J90 PLEURAL EFFUSION, NOT ELSEWHERE CLASSIFIED: ICD-10-CM

## 2023-01-01 DIAGNOSIS — C79.9 SECONDARY MALIGNANT NEOPLASM OF UNSPECIFIED SITE: ICD-10-CM

## 2023-01-01 DIAGNOSIS — I10 ESSENTIAL (PRIMARY) HYPERTENSION: ICD-10-CM

## 2023-01-01 DIAGNOSIS — R53.81 OTHER MALAISE: ICD-10-CM

## 2023-01-01 DIAGNOSIS — G91.2 (IDIOPATHIC) NORMAL PRESSURE HYDROCEPHALUS: ICD-10-CM

## 2023-01-01 LAB
24R-OH-CALCIDIOL SERPL-MCNC: 30.1 NG/ML — SIGNIFICANT CHANGE UP (ref 30–80)
ALBUMIN SERPL ELPH-MCNC: 1.9 G/DL — LOW (ref 3.5–5)
ALP SERPL-CCNC: 213 U/L — HIGH (ref 40–120)
ALT FLD-CCNC: 89 U/L DA — HIGH (ref 10–60)
ANION GAP SERPL CALC-SCNC: 3 MMOL/L — LOW (ref 5–17)
ANION GAP SERPL CALC-SCNC: 4 MMOL/L — LOW (ref 5–17)
ANION GAP SERPL CALC-SCNC: 4 MMOL/L — LOW (ref 5–17)
APPEARANCE UR: CLEAR — SIGNIFICANT CHANGE UP
AST SERPL-CCNC: 87 U/L — HIGH (ref 10–40)
BACTERIA # UR AUTO: ABNORMAL /HPF
BILIRUB SERPL-MCNC: 0.5 MG/DL — SIGNIFICANT CHANGE UP (ref 0.2–1.2)
BILIRUB UR-MCNC: NEGATIVE — SIGNIFICANT CHANGE UP
BUN SERPL-MCNC: 21 MG/DL — HIGH (ref 7–18)
BUN SERPL-MCNC: 26 MG/DL — HIGH (ref 7–18)
BUN SERPL-MCNC: 27 MG/DL — HIGH (ref 7–18)
CALCIUM SERPL-MCNC: 9.2 MG/DL — SIGNIFICANT CHANGE UP (ref 8.4–10.5)
CALCIUM SERPL-MCNC: 9.5 MG/DL — SIGNIFICANT CHANGE UP (ref 8.4–10.5)
CALCIUM SERPL-MCNC: 9.5 MG/DL — SIGNIFICANT CHANGE UP (ref 8.4–10.5)
CANCER AG125 SERPL-ACNC: 124 U/ML — HIGH
CANCER AG19-9 SERPL-ACNC: 22 U/ML — SIGNIFICANT CHANGE UP
CEA SERPL-MCNC: 45.2 NG/ML — HIGH (ref 0–3.8)
CHLORIDE SERPL-SCNC: 102 MMOL/L — SIGNIFICANT CHANGE UP (ref 96–108)
CHLORIDE SERPL-SCNC: 104 MMOL/L — SIGNIFICANT CHANGE UP (ref 96–108)
CHLORIDE SERPL-SCNC: 105 MMOL/L — SIGNIFICANT CHANGE UP (ref 96–108)
CO2 SERPL-SCNC: 28 MMOL/L — SIGNIFICANT CHANGE UP (ref 22–31)
CO2 SERPL-SCNC: 29 MMOL/L — SIGNIFICANT CHANGE UP (ref 22–31)
CO2 SERPL-SCNC: 29 MMOL/L — SIGNIFICANT CHANGE UP (ref 22–31)
COD CRY URNS QL: ABNORMAL /HPF
COLOR SPEC: YELLOW — SIGNIFICANT CHANGE UP
CREAT SERPL-MCNC: 0.64 MG/DL — SIGNIFICANT CHANGE UP (ref 0.5–1.3)
CREAT SERPL-MCNC: 0.78 MG/DL — SIGNIFICANT CHANGE UP (ref 0.5–1.3)
CREAT SERPL-MCNC: 0.79 MG/DL — SIGNIFICANT CHANGE UP (ref 0.5–1.3)
CULTURE RESULTS: NO GROWTH — SIGNIFICANT CHANGE UP
DIFF PNL FLD: ABNORMAL
EGFR: 92 ML/MIN/1.73M2 — SIGNIFICANT CHANGE UP
EGFR: 92 ML/MIN/1.73M2 — SIGNIFICANT CHANGE UP
EGFR: 98 ML/MIN/1.73M2 — SIGNIFICANT CHANGE UP
FLUAV AG NPH QL: SIGNIFICANT CHANGE UP
FLUBV AG NPH QL: SIGNIFICANT CHANGE UP
GLUCOSE BLDC GLUCOMTR-MCNC: 175 MG/DL — HIGH (ref 70–99)
GLUCOSE SERPL-MCNC: 108 MG/DL — HIGH (ref 70–99)
GLUCOSE SERPL-MCNC: 125 MG/DL — HIGH (ref 70–99)
GLUCOSE SERPL-MCNC: 142 MG/DL — HIGH (ref 70–99)
GLUCOSE UR QL: NEGATIVE — SIGNIFICANT CHANGE UP
GRAN CASTS # UR COMP ASSIST: ABNORMAL /LPF
HCT VFR BLD CALC: 26.8 % — LOW (ref 39–50)
HCT VFR BLD CALC: 27 % — LOW (ref 39–50)
HCT VFR BLD CALC: 27.4 % — LOW (ref 39–50)
HGB BLD-MCNC: 8.2 G/DL — LOW (ref 13–17)
HGB BLD-MCNC: 8.3 G/DL — LOW (ref 13–17)
HGB BLD-MCNC: 8.4 G/DL — LOW (ref 13–17)
KETONES UR-MCNC: NEGATIVE — SIGNIFICANT CHANGE UP
LEUKOCYTE ESTERASE UR-ACNC: ABNORMAL
MAGNESIUM SERPL-MCNC: 2.2 MG/DL — SIGNIFICANT CHANGE UP (ref 1.6–2.6)
MCHC RBC-ENTMCNC: 18.9 PG — LOW (ref 27–34)
MCHC RBC-ENTMCNC: 19 PG — LOW (ref 27–34)
MCHC RBC-ENTMCNC: 19.3 PG — LOW (ref 27–34)
MCHC RBC-ENTMCNC: 29.9 GM/DL — LOW (ref 32–36)
MCHC RBC-ENTMCNC: 31 GM/DL — LOW (ref 32–36)
MCHC RBC-ENTMCNC: 31.1 GM/DL — LOW (ref 32–36)
MCV RBC AUTO: 60.9 FL — LOW (ref 80–100)
MCV RBC AUTO: 62.2 FL — LOW (ref 80–100)
MCV RBC AUTO: 63.3 FL — LOW (ref 80–100)
NITRITE UR-MCNC: NEGATIVE — SIGNIFICANT CHANGE UP
NRBC # BLD: 0 /100 WBCS — SIGNIFICANT CHANGE UP (ref 0–0)
PH UR: 5 — SIGNIFICANT CHANGE UP (ref 5–8)
PHOSPHATE SERPL-MCNC: 3 MG/DL — SIGNIFICANT CHANGE UP (ref 2.5–4.5)
PLATELET # BLD AUTO: 335 K/UL — SIGNIFICANT CHANGE UP (ref 150–400)
PLATELET # BLD AUTO: 347 K/UL — SIGNIFICANT CHANGE UP (ref 150–400)
PLATELET # BLD AUTO: 351 K/UL — SIGNIFICANT CHANGE UP (ref 150–400)
POTASSIUM SERPL-MCNC: 3.9 MMOL/L — SIGNIFICANT CHANGE UP (ref 3.5–5.3)
POTASSIUM SERPL-MCNC: 4 MMOL/L — SIGNIFICANT CHANGE UP (ref 3.5–5.3)
POTASSIUM SERPL-MCNC: 4.1 MMOL/L — SIGNIFICANT CHANGE UP (ref 3.5–5.3)
POTASSIUM SERPL-SCNC: 3.9 MMOL/L — SIGNIFICANT CHANGE UP (ref 3.5–5.3)
POTASSIUM SERPL-SCNC: 4 MMOL/L — SIGNIFICANT CHANGE UP (ref 3.5–5.3)
POTASSIUM SERPL-SCNC: 4.1 MMOL/L — SIGNIFICANT CHANGE UP (ref 3.5–5.3)
PROT SERPL-MCNC: 7.3 G/DL — SIGNIFICANT CHANGE UP (ref 6–8.3)
PROT UR-MCNC: 30 MG/DL
RBC # BLD: 4.31 M/UL — SIGNIFICANT CHANGE UP (ref 4.2–5.8)
RBC # BLD: 4.33 M/UL — SIGNIFICANT CHANGE UP (ref 4.2–5.8)
RBC # BLD: 4.43 M/UL — SIGNIFICANT CHANGE UP (ref 4.2–5.8)
RBC # FLD: 15 % — HIGH (ref 10.3–14.5)
RBC # FLD: 15.3 % — HIGH (ref 10.3–14.5)
RBC # FLD: 15.5 % — HIGH (ref 10.3–14.5)
RBC CASTS # UR COMP ASSIST: ABNORMAL /HPF (ref 0–2)
SARS-COV-2 RNA SPEC QL NAA+PROBE: SIGNIFICANT CHANGE UP
SODIUM SERPL-SCNC: 134 MMOL/L — LOW (ref 135–145)
SODIUM SERPL-SCNC: 137 MMOL/L — SIGNIFICANT CHANGE UP (ref 135–145)
SODIUM SERPL-SCNC: 137 MMOL/L — SIGNIFICANT CHANGE UP (ref 135–145)
SP GR SPEC: 1.02 — SIGNIFICANT CHANGE UP (ref 1.01–1.02)
SPECIMEN SOURCE: SIGNIFICANT CHANGE UP
UROBILINOGEN FLD QL: 1 MG/DL
WBC # BLD: 13.11 K/UL — HIGH (ref 3.8–10.5)
WBC # BLD: 14.61 K/UL — HIGH (ref 3.8–10.5)
WBC # BLD: 14.71 K/UL — HIGH (ref 3.8–10.5)
WBC # FLD AUTO: 13.11 K/UL — HIGH (ref 3.8–10.5)
WBC # FLD AUTO: 14.61 K/UL — HIGH (ref 3.8–10.5)
WBC # FLD AUTO: 14.71 K/UL — HIGH (ref 3.8–10.5)
WBC UR QL: SIGNIFICANT CHANGE UP /HPF (ref 0–5)

## 2023-01-01 PROCEDURE — 71260 CT THORAX DX C+: CPT | Mod: 26

## 2023-01-01 PROCEDURE — 72170 X-RAY EXAM OF PELVIS: CPT

## 2023-01-01 PROCEDURE — 99285 EMERGENCY DEPT VISIT HI MDM: CPT

## 2023-01-01 PROCEDURE — 99498 ADVNCD CARE PLAN ADDL 30 MIN: CPT

## 2023-01-01 PROCEDURE — 99497 ADVNCD CARE PLAN 30 MIN: CPT | Mod: 25

## 2023-01-01 PROCEDURE — 81001 URINALYSIS AUTO W/SCOPE: CPT

## 2023-01-01 PROCEDURE — 96374 THER/PROPH/DIAG INJ IV PUSH: CPT

## 2023-01-01 PROCEDURE — 70450 CT HEAD/BRAIN W/O DYE: CPT | Mod: MA

## 2023-01-01 PROCEDURE — 84100 ASSAY OF PHOSPHORUS: CPT

## 2023-01-01 PROCEDURE — 80048 BASIC METABOLIC PNL TOTAL CA: CPT

## 2023-01-01 PROCEDURE — 83735 ASSAY OF MAGNESIUM: CPT

## 2023-01-01 PROCEDURE — 99284 EMERGENCY DEPT VISIT MOD MDM: CPT | Mod: 25

## 2023-01-01 PROCEDURE — 99284 EMERGENCY DEPT VISIT MOD MDM: CPT

## 2023-01-01 PROCEDURE — 99233 SBSQ HOSP IP/OBS HIGH 50: CPT

## 2023-01-01 PROCEDURE — 36415 COLL VENOUS BLD VENIPUNCTURE: CPT

## 2023-01-01 PROCEDURE — 87637 SARSCOV2&INF A&B&RSV AMP PRB: CPT

## 2023-01-01 PROCEDURE — 70450 CT HEAD/BRAIN W/O DYE: CPT | Mod: 26,MA

## 2023-01-01 PROCEDURE — 99497 ADVNCD CARE PLAN 30 MIN: CPT

## 2023-01-01 PROCEDURE — 93010 ELECTROCARDIOGRAM REPORT: CPT

## 2023-01-01 PROCEDURE — 72170 X-RAY EXAM OF PELVIS: CPT | Mod: 26

## 2023-01-01 PROCEDURE — 71045 X-RAY EXAM CHEST 1 VIEW: CPT | Mod: 26

## 2023-01-01 PROCEDURE — 86301 IMMUNOASSAY TUMOR CA 19-9: CPT

## 2023-01-01 PROCEDURE — 80053 COMPREHEN METABOLIC PANEL: CPT

## 2023-01-01 PROCEDURE — 71045 X-RAY EXAM CHEST 1 VIEW: CPT

## 2023-01-01 PROCEDURE — 74177 CT ABD & PELVIS W/CONTRAST: CPT | Mod: 26

## 2023-01-01 PROCEDURE — 87086 URINE CULTURE/COLONY COUNT: CPT

## 2023-01-01 PROCEDURE — 82306 VITAMIN D 25 HYDROXY: CPT

## 2023-01-01 PROCEDURE — 86304 IMMUNOASSAY TUMOR CA 125: CPT

## 2023-01-01 PROCEDURE — 71260 CT THORAX DX C+: CPT

## 2023-01-01 PROCEDURE — 85027 COMPLETE CBC AUTOMATED: CPT

## 2023-01-01 PROCEDURE — 99222 1ST HOSP IP/OBS MODERATE 55: CPT

## 2023-01-01 PROCEDURE — 74177 CT ABD & PELVIS W/CONTRAST: CPT

## 2023-01-01 PROCEDURE — 82378 CARCINOEMBRYONIC ANTIGEN: CPT

## 2023-01-01 PROCEDURE — 93005 ELECTROCARDIOGRAM TRACING: CPT

## 2023-01-01 PROCEDURE — 82962 GLUCOSE BLOOD TEST: CPT

## 2023-01-01 RX ORDER — CHOLECALCIFEROL (VITAMIN D3) 125 MCG
5000 CAPSULE ORAL DAILY
Refills: 0 | Status: DISCONTINUED | OUTPATIENT
Start: 2023-01-01 | End: 2023-01-01

## 2023-01-01 RX ORDER — OXYCODONE HYDROCHLORIDE 5 MG/1
2.5 TABLET ORAL EVERY 4 HOURS
Refills: 0 | Status: DISCONTINUED | OUTPATIENT
Start: 2023-01-01 | End: 2023-01-01

## 2023-01-01 RX ORDER — SERTRALINE 25 MG/1
100 TABLET, FILM COATED ORAL DAILY
Refills: 0 | Status: DISCONTINUED | OUTPATIENT
Start: 2023-01-01 | End: 2023-01-01

## 2023-01-01 RX ORDER — KETOROLAC TROMETHAMINE 30 MG/ML
15 SYRINGE (ML) INJECTION EVERY 6 HOURS
Refills: 0 | Status: DISCONTINUED | OUTPATIENT
Start: 2023-01-01 | End: 2023-01-01

## 2023-01-01 RX ORDER — LANOLIN ALCOHOL/MO/W.PET/CERES
3 CREAM (GRAM) TOPICAL AT BEDTIME
Refills: 0 | Status: DISCONTINUED | OUTPATIENT
Start: 2023-01-01 | End: 2023-01-01

## 2023-01-01 RX ORDER — SODIUM CHLORIDE 9 MG/ML
500 INJECTION, SOLUTION INTRAVENOUS ONCE
Refills: 0 | Status: COMPLETED | OUTPATIENT
Start: 2023-01-01 | End: 2023-01-01

## 2023-01-01 RX ORDER — ATORVASTATIN CALCIUM 80 MG/1
20 TABLET, FILM COATED ORAL AT BEDTIME
Refills: 0 | Status: DISCONTINUED | OUTPATIENT
Start: 2023-01-01 | End: 2023-01-01

## 2023-01-01 RX ORDER — ROBINUL 0.2 MG/ML
0.2 INJECTION INTRAMUSCULAR; INTRAVENOUS EVERY 6 HOURS
Refills: 0 | Status: DISCONTINUED | OUTPATIENT
Start: 2023-01-01 | End: 2023-01-01

## 2023-01-01 RX ORDER — METOPROLOL TARTRATE 50 MG
1 TABLET ORAL
Qty: 0 | Refills: 0 | DISCHARGE

## 2023-01-01 RX ORDER — AMLODIPINE BESYLATE 2.5 MG/1
5 TABLET ORAL DAILY
Refills: 0 | Status: DISCONTINUED | OUTPATIENT
Start: 2023-01-01 | End: 2023-01-01

## 2023-01-01 RX ORDER — METOPROLOL TARTRATE 50 MG
1 TABLET ORAL
Refills: 0 | DISCHARGE

## 2023-01-01 RX ORDER — MORPHINE SULFATE 50 MG/1
2 CAPSULE, EXTENDED RELEASE ORAL ONCE
Refills: 0 | Status: DISCONTINUED | OUTPATIENT
Start: 2023-01-01 | End: 2023-01-01

## 2023-01-01 RX ORDER — SENNA PLUS 8.6 MG/1
2 TABLET ORAL
Qty: 0 | Refills: 0 | DISCHARGE
Start: 2023-01-01

## 2023-01-01 RX ORDER — CHOLECALCIFEROL (VITAMIN D3) 125 MCG
1 CAPSULE ORAL
Refills: 0 | DISCHARGE

## 2023-01-01 RX ORDER — LIDOCAINE 4 G/100G
1 CREAM TOPICAL DAILY
Refills: 0 | Status: DISCONTINUED | OUTPATIENT
Start: 2023-01-01 | End: 2023-01-01

## 2023-01-01 RX ORDER — POLYETHYLENE GLYCOL 3350 17 G/17G
17 POWDER, FOR SOLUTION ORAL
Qty: 0 | Refills: 0 | DISCHARGE
Start: 2023-01-01

## 2023-01-01 RX ORDER — FINASTERIDE 5 MG/1
5 TABLET, FILM COATED ORAL DAILY
Refills: 0 | Status: DISCONTINUED | OUTPATIENT
Start: 2023-01-01 | End: 2023-01-01

## 2023-01-01 RX ORDER — METOPROLOL TARTRATE 50 MG
50 TABLET ORAL DAILY
Refills: 0 | Status: DISCONTINUED | OUTPATIENT
Start: 2023-01-01 | End: 2023-01-01

## 2023-01-01 RX ORDER — PREGABALIN 225 MG/1
1 CAPSULE ORAL
Qty: 0 | Refills: 0 | DISCHARGE

## 2023-01-01 RX ORDER — HYDROMORPHONE HYDROCHLORIDE 2 MG/ML
2 INJECTION INTRAMUSCULAR; INTRAVENOUS; SUBCUTANEOUS EVERY 4 HOURS
Refills: 0 | Status: DISCONTINUED | OUTPATIENT
Start: 2023-01-01 | End: 2023-01-01

## 2023-01-01 RX ORDER — POLYETHYLENE GLYCOL 3350 17 G/17G
17 POWDER, FOR SOLUTION ORAL DAILY
Refills: 0 | Status: DISCONTINUED | OUTPATIENT
Start: 2023-01-01 | End: 2023-01-01

## 2023-01-01 RX ORDER — LIDOCAINE 4 G/100G
1 CREAM TOPICAL
Qty: 0 | Refills: 0 | DISCHARGE
Start: 2023-01-01

## 2023-01-01 RX ORDER — PREGABALIN 225 MG/1
1000 CAPSULE ORAL DAILY
Refills: 0 | Status: DISCONTINUED | OUTPATIENT
Start: 2023-01-01 | End: 2023-01-01

## 2023-01-01 RX ORDER — FINASTERIDE 5 MG/1
1 TABLET, FILM COATED ORAL
Refills: 0 | DISCHARGE

## 2023-01-01 RX ORDER — OXYCODONE HYDROCHLORIDE 5 MG/1
1 TABLET ORAL
Qty: 0 | Refills: 0 | DISCHARGE
Start: 2023-01-01

## 2023-01-01 RX ORDER — OXYCODONE HYDROCHLORIDE 5 MG/1
5 TABLET ORAL EVERY 4 HOURS
Refills: 0 | Status: DISCONTINUED | OUTPATIENT
Start: 2023-01-01 | End: 2023-01-01

## 2023-01-01 RX ORDER — ACETAMINOPHEN 500 MG
650 TABLET ORAL EVERY 6 HOURS
Refills: 0 | Status: DISCONTINUED | OUTPATIENT
Start: 2023-01-01 | End: 2023-01-01

## 2023-01-01 RX ORDER — TAMSULOSIN HYDROCHLORIDE 0.4 MG/1
1 CAPSULE ORAL
Qty: 0 | Refills: 0 | DISCHARGE

## 2023-01-01 RX ORDER — APIXABAN 2.5 MG/1
5 TABLET, FILM COATED ORAL EVERY 12 HOURS
Refills: 0 | Status: DISCONTINUED | OUTPATIENT
Start: 2023-01-01 | End: 2023-01-01

## 2023-01-01 RX ORDER — DIATRIZOATE MEGLUMINE 180 MG/ML
30 INJECTION, SOLUTION INTRAVESICAL ONCE
Refills: 0 | Status: COMPLETED | OUTPATIENT
Start: 2023-01-01 | End: 2023-01-01

## 2023-01-01 RX ORDER — NALOXONE HYDROCHLORIDE 4 MG/.1ML
0.4 SPRAY NASAL ONCE
Refills: 0 | Status: DISCONTINUED | OUTPATIENT
Start: 2023-01-01 | End: 2023-01-01

## 2023-01-01 RX ORDER — APIXABAN 2.5 MG/1
1 TABLET, FILM COATED ORAL
Refills: 0 | DISCHARGE

## 2023-01-01 RX ORDER — SODIUM CHLORIDE 9 MG/ML
1000 INJECTION INTRAMUSCULAR; INTRAVENOUS; SUBCUTANEOUS ONCE
Refills: 0 | Status: COMPLETED | OUTPATIENT
Start: 2023-01-01 | End: 2023-01-01

## 2023-01-01 RX ORDER — HYDROMORPHONE HYDROCHLORIDE 2 MG/ML
1 INJECTION INTRAMUSCULAR; INTRAVENOUS; SUBCUTANEOUS EVERY 6 HOURS
Refills: 0 | Status: DISCONTINUED | OUTPATIENT
Start: 2023-01-01 | End: 2023-01-01

## 2023-01-01 RX ORDER — AMLODIPINE BESYLATE 2.5 MG/1
1 TABLET ORAL
Refills: 0 | DISCHARGE

## 2023-01-01 RX ORDER — ONDANSETRON 8 MG/1
4 TABLET, FILM COATED ORAL EVERY 8 HOURS
Refills: 0 | Status: DISCONTINUED | OUTPATIENT
Start: 2023-01-01 | End: 2023-01-01

## 2023-01-01 RX ORDER — SERTRALINE 25 MG/1
1 TABLET, FILM COATED ORAL
Refills: 0 | DISCHARGE

## 2023-01-01 RX ORDER — FINASTERIDE 5 MG/1
1 TABLET, FILM COATED ORAL
Qty: 0 | Refills: 0 | DISCHARGE

## 2023-01-01 RX ORDER — SENNA PLUS 8.6 MG/1
2 TABLET ORAL AT BEDTIME
Refills: 0 | Status: DISCONTINUED | OUTPATIENT
Start: 2023-01-01 | End: 2023-01-01

## 2023-01-01 RX ORDER — OXYCODONE HYDROCHLORIDE 5 MG/1
2.5 TABLET ORAL
Qty: 0 | Refills: 0 | DISCHARGE
Start: 2023-01-01

## 2023-01-01 RX ADMIN — FINASTERIDE 5 MILLIGRAM(S): 5 TABLET, FILM COATED ORAL at 23:04

## 2023-01-01 RX ADMIN — AMLODIPINE BESYLATE 5 MILLIGRAM(S): 2.5 TABLET ORAL at 05:10

## 2023-01-01 RX ADMIN — LIDOCAINE 1 PATCH: 4 CREAM TOPICAL at 00:21

## 2023-01-01 RX ADMIN — LIDOCAINE 1 PATCH: 4 CREAM TOPICAL at 21:00

## 2023-01-01 RX ADMIN — ROBINUL 0.2 MILLIGRAM(S): 0.2 INJECTION INTRAMUSCULAR; INTRAVENOUS at 14:46

## 2023-01-01 RX ADMIN — AMLODIPINE BESYLATE 5 MILLIGRAM(S): 2.5 TABLET ORAL at 23:04

## 2023-01-01 RX ADMIN — SERTRALINE 100 MILLIGRAM(S): 25 TABLET, FILM COATED ORAL at 12:48

## 2023-01-01 RX ADMIN — APIXABAN 5 MILLIGRAM(S): 2.5 TABLET, FILM COATED ORAL at 17:03

## 2023-01-01 RX ADMIN — APIXABAN 5 MILLIGRAM(S): 2.5 TABLET, FILM COATED ORAL at 18:24

## 2023-01-01 RX ADMIN — Medication 20 MILLIGRAM(S): at 18:43

## 2023-01-01 RX ADMIN — Medication 20 MILLIGRAM(S): at 05:13

## 2023-01-01 RX ADMIN — Medication 5000 UNIT(S): at 11:16

## 2023-01-01 RX ADMIN — LIDOCAINE 1 PATCH: 4 CREAM TOPICAL at 01:00

## 2023-01-01 RX ADMIN — Medication 20 MILLIGRAM(S): at 06:30

## 2023-01-01 RX ADMIN — OXYCODONE HYDROCHLORIDE 2.5 MILLIGRAM(S): 5 TABLET ORAL at 05:45

## 2023-01-01 RX ADMIN — APIXABAN 5 MILLIGRAM(S): 2.5 TABLET, FILM COATED ORAL at 18:14

## 2023-01-01 RX ADMIN — Medication 50 MILLIGRAM(S): at 23:04

## 2023-01-01 RX ADMIN — ATORVASTATIN CALCIUM 20 MILLIGRAM(S): 80 TABLET, FILM COATED ORAL at 23:04

## 2023-01-01 RX ADMIN — ATORVASTATIN CALCIUM 20 MILLIGRAM(S): 80 TABLET, FILM COATED ORAL at 22:32

## 2023-01-01 RX ADMIN — SENNA PLUS 2 TABLET(S): 8.6 TABLET ORAL at 22:48

## 2023-01-01 RX ADMIN — APIXABAN 5 MILLIGRAM(S): 2.5 TABLET, FILM COATED ORAL at 05:33

## 2023-01-01 RX ADMIN — MORPHINE SULFATE 2 MILLIGRAM(S): 50 CAPSULE, EXTENDED RELEASE ORAL at 16:04

## 2023-01-01 RX ADMIN — PREGABALIN 1000 MICROGRAM(S): 225 CAPSULE ORAL at 11:15

## 2023-01-01 RX ADMIN — LIDOCAINE 1 PATCH: 4 CREAM TOPICAL at 01:13

## 2023-01-01 RX ADMIN — FINASTERIDE 5 MILLIGRAM(S): 5 TABLET, FILM COATED ORAL at 12:48

## 2023-01-01 RX ADMIN — Medication 650 MILLIGRAM(S): at 14:37

## 2023-01-01 RX ADMIN — LIDOCAINE 1 PATCH: 4 CREAM TOPICAL at 19:34

## 2023-01-01 RX ADMIN — APIXABAN 5 MILLIGRAM(S): 2.5 TABLET, FILM COATED ORAL at 06:30

## 2023-01-01 RX ADMIN — AMLODIPINE BESYLATE 5 MILLIGRAM(S): 2.5 TABLET ORAL at 06:30

## 2023-01-01 RX ADMIN — PREGABALIN 1000 MICROGRAM(S): 225 CAPSULE ORAL at 12:53

## 2023-01-01 RX ADMIN — LIDOCAINE 1 PATCH: 4 CREAM TOPICAL at 19:30

## 2023-01-01 RX ADMIN — LIDOCAINE 1 PATCH: 4 CREAM TOPICAL at 19:29

## 2023-01-01 RX ADMIN — Medication 20 MILLIGRAM(S): at 17:03

## 2023-01-01 RX ADMIN — OXYCODONE HYDROCHLORIDE 5 MILLIGRAM(S): 5 TABLET ORAL at 11:04

## 2023-01-01 RX ADMIN — SERTRALINE 100 MILLIGRAM(S): 25 TABLET, FILM COATED ORAL at 11:15

## 2023-01-01 RX ADMIN — Medication 20 MILLIGRAM(S): at 18:14

## 2023-01-01 RX ADMIN — POLYETHYLENE GLYCOL 3350 17 GRAM(S): 17 POWDER, FOR SOLUTION ORAL at 12:54

## 2023-01-01 RX ADMIN — Medication 20 MILLIGRAM(S): at 18:05

## 2023-01-01 RX ADMIN — MORPHINE SULFATE 2 MILLIGRAM(S): 50 CAPSULE, EXTENDED RELEASE ORAL at 15:49

## 2023-01-01 RX ADMIN — Medication 20 MILLIGRAM(S): at 17:37

## 2023-01-01 RX ADMIN — SERTRALINE 100 MILLIGRAM(S): 25 TABLET, FILM COATED ORAL at 12:49

## 2023-01-01 RX ADMIN — APIXABAN 5 MILLIGRAM(S): 2.5 TABLET, FILM COATED ORAL at 05:28

## 2023-01-01 RX ADMIN — LIDOCAINE 1 PATCH: 4 CREAM TOPICAL at 19:40

## 2023-01-01 RX ADMIN — SERTRALINE 100 MILLIGRAM(S): 25 TABLET, FILM COATED ORAL at 12:56

## 2023-01-01 RX ADMIN — AMLODIPINE BESYLATE 5 MILLIGRAM(S): 2.5 TABLET ORAL at 05:38

## 2023-01-01 RX ADMIN — AMLODIPINE BESYLATE 5 MILLIGRAM(S): 2.5 TABLET ORAL at 05:52

## 2023-01-01 RX ADMIN — Medication 50 MILLIGRAM(S): at 05:38

## 2023-01-01 RX ADMIN — AMLODIPINE BESYLATE 5 MILLIGRAM(S): 2.5 TABLET ORAL at 05:27

## 2023-01-01 RX ADMIN — Medication 650 MILLIGRAM(S): at 20:46

## 2023-01-01 RX ADMIN — LIDOCAINE 1 PATCH: 4 CREAM TOPICAL at 00:14

## 2023-01-01 RX ADMIN — Medication 50 MILLIGRAM(S): at 05:46

## 2023-01-01 RX ADMIN — APIXABAN 5 MILLIGRAM(S): 2.5 TABLET, FILM COATED ORAL at 17:30

## 2023-01-01 RX ADMIN — ATORVASTATIN CALCIUM 20 MILLIGRAM(S): 80 TABLET, FILM COATED ORAL at 22:50

## 2023-01-01 RX ADMIN — Medication 650 MILLIGRAM(S): at 08:07

## 2023-01-01 RX ADMIN — SENNA PLUS 2 TABLET(S): 8.6 TABLET ORAL at 21:49

## 2023-01-01 RX ADMIN — ATORVASTATIN CALCIUM 20 MILLIGRAM(S): 80 TABLET, FILM COATED ORAL at 21:21

## 2023-01-01 RX ADMIN — ONDANSETRON 4 MILLIGRAM(S): 8 TABLET, FILM COATED ORAL at 18:39

## 2023-01-01 RX ADMIN — AMLODIPINE BESYLATE 5 MILLIGRAM(S): 2.5 TABLET ORAL at 05:31

## 2023-01-01 RX ADMIN — SENNA PLUS 2 TABLET(S): 8.6 TABLET ORAL at 21:21

## 2023-01-01 RX ADMIN — LIDOCAINE 1 PATCH: 4 CREAM TOPICAL at 12:55

## 2023-01-01 RX ADMIN — Medication 5000 UNIT(S): at 12:21

## 2023-01-01 RX ADMIN — AMLODIPINE BESYLATE 5 MILLIGRAM(S): 2.5 TABLET ORAL at 05:13

## 2023-01-01 RX ADMIN — AMLODIPINE BESYLATE 5 MILLIGRAM(S): 2.5 TABLET ORAL at 05:33

## 2023-01-01 RX ADMIN — Medication 50 MILLIGRAM(S): at 05:27

## 2023-01-01 RX ADMIN — APIXABAN 5 MILLIGRAM(S): 2.5 TABLET, FILM COATED ORAL at 17:57

## 2023-01-01 RX ADMIN — Medication 20 MILLIGRAM(S): at 05:10

## 2023-01-01 RX ADMIN — Medication 650 MILLIGRAM(S): at 06:20

## 2023-01-01 RX ADMIN — OXYCODONE HYDROCHLORIDE 2.5 MILLIGRAM(S): 5 TABLET ORAL at 21:50

## 2023-01-01 RX ADMIN — SENNA PLUS 2 TABLET(S): 8.6 TABLET ORAL at 22:50

## 2023-01-01 RX ADMIN — Medication 5000 UNIT(S): at 12:48

## 2023-01-01 RX ADMIN — Medication 20 MILLIGRAM(S): at 05:41

## 2023-01-01 RX ADMIN — Medication 650 MILLIGRAM(S): at 12:29

## 2023-01-01 RX ADMIN — Medication 5000 UNIT(S): at 12:54

## 2023-01-01 RX ADMIN — Medication 20 MILLIGRAM(S): at 05:33

## 2023-01-01 RX ADMIN — Medication 650 MILLIGRAM(S): at 12:37

## 2023-01-01 RX ADMIN — PREGABALIN 1000 MICROGRAM(S): 225 CAPSULE ORAL at 12:29

## 2023-01-01 RX ADMIN — Medication 650 MILLIGRAM(S): at 17:05

## 2023-01-01 RX ADMIN — LIDOCAINE 1 PATCH: 4 CREAM TOPICAL at 12:50

## 2023-01-01 RX ADMIN — APIXABAN 5 MILLIGRAM(S): 2.5 TABLET, FILM COATED ORAL at 05:04

## 2023-01-01 RX ADMIN — LIDOCAINE 1 PATCH: 4 CREAM TOPICAL at 11:55

## 2023-01-01 RX ADMIN — APIXABAN 5 MILLIGRAM(S): 2.5 TABLET, FILM COATED ORAL at 05:41

## 2023-01-01 RX ADMIN — SENNA PLUS 2 TABLET(S): 8.6 TABLET ORAL at 22:27

## 2023-01-01 RX ADMIN — LIDOCAINE 1 PATCH: 4 CREAM TOPICAL at 13:09

## 2023-01-01 RX ADMIN — Medication 20 MILLIGRAM(S): at 05:04

## 2023-01-01 RX ADMIN — ATORVASTATIN CALCIUM 20 MILLIGRAM(S): 80 TABLET, FILM COATED ORAL at 21:33

## 2023-01-01 RX ADMIN — LIDOCAINE 1 PATCH: 4 CREAM TOPICAL at 01:22

## 2023-01-01 RX ADMIN — Medication 5000 UNIT(S): at 13:34

## 2023-01-01 RX ADMIN — APIXABAN 5 MILLIGRAM(S): 2.5 TABLET, FILM COATED ORAL at 05:13

## 2023-01-01 RX ADMIN — Medication 20 MILLIGRAM(S): at 17:57

## 2023-01-01 RX ADMIN — Medication 650 MILLIGRAM(S): at 05:34

## 2023-01-01 RX ADMIN — OXYCODONE HYDROCHLORIDE 2.5 MILLIGRAM(S): 5 TABLET ORAL at 13:19

## 2023-01-01 RX ADMIN — SERTRALINE 100 MILLIGRAM(S): 25 TABLET, FILM COATED ORAL at 13:04

## 2023-01-01 RX ADMIN — Medication 20 MILLIGRAM(S): at 23:04

## 2023-01-01 RX ADMIN — SERTRALINE 100 MILLIGRAM(S): 25 TABLET, FILM COATED ORAL at 23:04

## 2023-01-01 RX ADMIN — DIATRIZOATE MEGLUMINE 30 MILLILITER(S): 180 INJECTION, SOLUTION INTRAVESICAL at 10:34

## 2023-01-01 RX ADMIN — POLYETHYLENE GLYCOL 3350 17 GRAM(S): 17 POWDER, FOR SOLUTION ORAL at 12:57

## 2023-01-01 RX ADMIN — FINASTERIDE 5 MILLIGRAM(S): 5 TABLET, FILM COATED ORAL at 13:35

## 2023-01-01 RX ADMIN — SENNA PLUS 2 TABLET(S): 8.6 TABLET ORAL at 21:12

## 2023-01-01 RX ADMIN — ATORVASTATIN CALCIUM 20 MILLIGRAM(S): 80 TABLET, FILM COATED ORAL at 23:11

## 2023-01-01 RX ADMIN — SENNA PLUS 2 TABLET(S): 8.6 TABLET ORAL at 22:26

## 2023-01-01 RX ADMIN — SENNA PLUS 2 TABLET(S): 8.6 TABLET ORAL at 23:11

## 2023-01-01 RX ADMIN — Medication 50 MILLIGRAM(S): at 06:30

## 2023-01-01 RX ADMIN — Medication 20 MILLIGRAM(S): at 05:28

## 2023-01-01 RX ADMIN — SODIUM CHLORIDE 1000 MILLILITER(S): 9 INJECTION INTRAMUSCULAR; INTRAVENOUS; SUBCUTANEOUS at 15:49

## 2023-01-01 RX ADMIN — APIXABAN 5 MILLIGRAM(S): 2.5 TABLET, FILM COATED ORAL at 18:06

## 2023-01-01 RX ADMIN — Medication 50 MILLIGRAM(S): at 05:33

## 2023-01-01 RX ADMIN — Medication 5000 UNIT(S): at 12:56

## 2023-01-01 RX ADMIN — SERTRALINE 100 MILLIGRAM(S): 25 TABLET, FILM COATED ORAL at 12:52

## 2023-01-01 RX ADMIN — FINASTERIDE 5 MILLIGRAM(S): 5 TABLET, FILM COATED ORAL at 12:20

## 2023-01-01 RX ADMIN — Medication 20 MILLIGRAM(S): at 05:31

## 2023-01-01 RX ADMIN — Medication 5000 UNIT(S): at 13:03

## 2023-01-01 RX ADMIN — APIXABAN 5 MILLIGRAM(S): 2.5 TABLET, FILM COATED ORAL at 17:00

## 2023-01-01 RX ADMIN — ATORVASTATIN CALCIUM 20 MILLIGRAM(S): 80 TABLET, FILM COATED ORAL at 22:26

## 2023-01-01 RX ADMIN — Medication 50 MILLIGRAM(S): at 05:04

## 2023-01-01 RX ADMIN — Medication 650 MILLIGRAM(S): at 21:30

## 2023-01-01 RX ADMIN — Medication 20 MILLIGRAM(S): at 05:51

## 2023-01-01 RX ADMIN — AMLODIPINE BESYLATE 5 MILLIGRAM(S): 2.5 TABLET ORAL at 05:04

## 2023-01-01 RX ADMIN — Medication 20 MILLIGRAM(S): at 18:06

## 2023-01-01 RX ADMIN — OXYCODONE HYDROCHLORIDE 5 MILLIGRAM(S): 5 TABLET ORAL at 18:45

## 2023-01-01 RX ADMIN — SENNA PLUS 2 TABLET(S): 8.6 TABLET ORAL at 21:34

## 2023-01-01 RX ADMIN — Medication 20 MILLIGRAM(S): at 18:24

## 2023-01-01 RX ADMIN — PREGABALIN 1000 MICROGRAM(S): 225 CAPSULE ORAL at 12:49

## 2023-01-01 RX ADMIN — ATORVASTATIN CALCIUM 20 MILLIGRAM(S): 80 TABLET, FILM COATED ORAL at 22:49

## 2023-01-01 RX ADMIN — PREGABALIN 1000 MICROGRAM(S): 225 CAPSULE ORAL at 12:57

## 2023-01-01 RX ADMIN — LIDOCAINE 1 PATCH: 4 CREAM TOPICAL at 00:30

## 2023-01-01 RX ADMIN — ATORVASTATIN CALCIUM 20 MILLIGRAM(S): 80 TABLET, FILM COATED ORAL at 22:27

## 2023-01-01 RX ADMIN — PREGABALIN 1000 MICROGRAM(S): 225 CAPSULE ORAL at 12:47

## 2023-01-01 RX ADMIN — Medication 20 MILLIGRAM(S): at 05:47

## 2023-01-01 RX ADMIN — APIXABAN 5 MILLIGRAM(S): 2.5 TABLET, FILM COATED ORAL at 05:31

## 2023-01-01 RX ADMIN — FINASTERIDE 5 MILLIGRAM(S): 5 TABLET, FILM COATED ORAL at 11:15

## 2023-01-01 RX ADMIN — Medication 50 MILLIGRAM(S): at 05:13

## 2023-01-01 RX ADMIN — FINASTERIDE 5 MILLIGRAM(S): 5 TABLET, FILM COATED ORAL at 12:49

## 2023-01-01 RX ADMIN — Medication 20 MILLIGRAM(S): at 18:00

## 2023-01-01 RX ADMIN — AMLODIPINE BESYLATE 5 MILLIGRAM(S): 2.5 TABLET ORAL at 05:41

## 2023-01-01 RX ADMIN — LIDOCAINE 1 PATCH: 4 CREAM TOPICAL at 19:19

## 2023-01-01 RX ADMIN — APIXABAN 5 MILLIGRAM(S): 2.5 TABLET, FILM COATED ORAL at 05:52

## 2023-01-01 RX ADMIN — OXYCODONE HYDROCHLORIDE 2.5 MILLIGRAM(S): 5 TABLET ORAL at 22:35

## 2023-01-01 RX ADMIN — SERTRALINE 100 MILLIGRAM(S): 25 TABLET, FILM COATED ORAL at 12:47

## 2023-01-01 RX ADMIN — PREGABALIN 1000 MICROGRAM(S): 225 CAPSULE ORAL at 13:34

## 2023-01-01 RX ADMIN — ROBINUL 0.2 MILLIGRAM(S): 0.2 INJECTION INTRAMUSCULAR; INTRAVENOUS at 18:05

## 2023-01-01 RX ADMIN — Medication 50 MILLIGRAM(S): at 05:41

## 2023-01-01 RX ADMIN — Medication 650 MILLIGRAM(S): at 13:20

## 2023-01-01 RX ADMIN — Medication 5000 UNIT(S): at 12:49

## 2023-01-01 RX ADMIN — Medication 20 MILLIGRAM(S): at 17:17

## 2023-01-01 RX ADMIN — FINASTERIDE 5 MILLIGRAM(S): 5 TABLET, FILM COATED ORAL at 12:52

## 2023-01-01 RX ADMIN — APIXABAN 5 MILLIGRAM(S): 2.5 TABLET, FILM COATED ORAL at 18:43

## 2023-01-01 RX ADMIN — Medication 5000 UNIT(S): at 11:54

## 2023-01-01 RX ADMIN — FINASTERIDE 5 MILLIGRAM(S): 5 TABLET, FILM COATED ORAL at 11:54

## 2023-01-01 RX ADMIN — APIXABAN 5 MILLIGRAM(S): 2.5 TABLET, FILM COATED ORAL at 17:37

## 2023-01-01 RX ADMIN — PREGABALIN 1000 MICROGRAM(S): 225 CAPSULE ORAL at 12:48

## 2023-01-01 RX ADMIN — Medication 50 MILLIGRAM(S): at 05:52

## 2023-01-01 RX ADMIN — PREGABALIN 1000 MICROGRAM(S): 225 CAPSULE ORAL at 11:54

## 2023-01-01 RX ADMIN — Medication 20 MILLIGRAM(S): at 05:38

## 2023-01-01 RX ADMIN — APIXABAN 5 MILLIGRAM(S): 2.5 TABLET, FILM COATED ORAL at 17:18

## 2023-01-01 RX ADMIN — SERTRALINE 100 MILLIGRAM(S): 25 TABLET, FILM COATED ORAL at 12:20

## 2023-01-01 RX ADMIN — ATORVASTATIN CALCIUM 20 MILLIGRAM(S): 80 TABLET, FILM COATED ORAL at 21:49

## 2023-01-01 RX ADMIN — POLYETHYLENE GLYCOL 3350 17 GRAM(S): 17 POWDER, FOR SOLUTION ORAL at 13:36

## 2023-01-01 RX ADMIN — APIXABAN 5 MILLIGRAM(S): 2.5 TABLET, FILM COATED ORAL at 18:00

## 2023-01-01 RX ADMIN — FINASTERIDE 5 MILLIGRAM(S): 5 TABLET, FILM COATED ORAL at 12:57

## 2023-01-01 RX ADMIN — APIXABAN 5 MILLIGRAM(S): 2.5 TABLET, FILM COATED ORAL at 05:37

## 2023-01-01 RX ADMIN — APIXABAN 5 MILLIGRAM(S): 2.5 TABLET, FILM COATED ORAL at 23:04

## 2023-01-01 RX ADMIN — LIDOCAINE 1 PATCH: 4 CREAM TOPICAL at 23:00

## 2023-01-01 RX ADMIN — Medication 50 MILLIGRAM(S): at 05:10

## 2023-01-01 RX ADMIN — LIDOCAINE 1 PATCH: 4 CREAM TOPICAL at 20:04

## 2023-01-01 RX ADMIN — ATORVASTATIN CALCIUM 20 MILLIGRAM(S): 80 TABLET, FILM COATED ORAL at 21:57

## 2023-01-01 RX ADMIN — APIXABAN 5 MILLIGRAM(S): 2.5 TABLET, FILM COATED ORAL at 05:46

## 2023-01-01 RX ADMIN — SERTRALINE 100 MILLIGRAM(S): 25 TABLET, FILM COATED ORAL at 12:29

## 2023-01-01 RX ADMIN — OXYCODONE HYDROCHLORIDE 5 MILLIGRAM(S): 5 TABLET ORAL at 18:05

## 2023-01-01 RX ADMIN — LIDOCAINE 1 PATCH: 4 CREAM TOPICAL at 19:24

## 2023-01-01 RX ADMIN — LIDOCAINE 1 PATCH: 4 CREAM TOPICAL at 13:36

## 2023-01-01 RX ADMIN — ATORVASTATIN CALCIUM 20 MILLIGRAM(S): 80 TABLET, FILM COATED ORAL at 21:12

## 2023-01-01 RX ADMIN — OXYCODONE HYDROCHLORIDE 2.5 MILLIGRAM(S): 5 TABLET ORAL at 16:12

## 2023-01-01 RX ADMIN — APIXABAN 5 MILLIGRAM(S): 2.5 TABLET, FILM COATED ORAL at 05:10

## 2023-01-01 RX ADMIN — SENNA PLUS 2 TABLET(S): 8.6 TABLET ORAL at 21:58

## 2023-01-01 RX ADMIN — Medication 5000 UNIT(S): at 12:47

## 2023-01-01 RX ADMIN — OXYCODONE HYDROCHLORIDE 5 MILLIGRAM(S): 5 TABLET ORAL at 11:44

## 2023-01-01 RX ADMIN — PREGABALIN 1000 MICROGRAM(S): 225 CAPSULE ORAL at 23:04

## 2023-01-01 RX ADMIN — OXYCODONE HYDROCHLORIDE 2.5 MILLIGRAM(S): 5 TABLET ORAL at 05:13

## 2023-01-01 RX ADMIN — Medication 20 MILLIGRAM(S): at 17:00

## 2023-01-01 RX ADMIN — Medication 650 MILLIGRAM(S): at 13:07

## 2023-01-01 RX ADMIN — Medication 20 MILLIGRAM(S): at 05:37

## 2023-01-01 RX ADMIN — LIDOCAINE 1 PATCH: 4 CREAM TOPICAL at 12:21

## 2023-01-01 RX ADMIN — LIDOCAINE 1 PATCH: 4 CREAM TOPICAL at 19:00

## 2023-01-01 RX ADMIN — AMLODIPINE BESYLATE 5 MILLIGRAM(S): 2.5 TABLET ORAL at 05:46

## 2023-01-01 RX ADMIN — LIDOCAINE 1 PATCH: 4 CREAM TOPICAL at 12:48

## 2023-01-01 RX ADMIN — PREGABALIN 1000 MICROGRAM(S): 225 CAPSULE ORAL at 12:21

## 2023-01-01 RX ADMIN — Medication 650 MILLIGRAM(S): at 06:16

## 2023-01-01 RX ADMIN — OXYCODONE HYDROCHLORIDE 2.5 MILLIGRAM(S): 5 TABLET ORAL at 05:31

## 2023-01-01 RX ADMIN — FINASTERIDE 5 MILLIGRAM(S): 5 TABLET, FILM COATED ORAL at 13:04

## 2023-01-01 RX ADMIN — HYDROMORPHONE HYDROCHLORIDE 1 MILLIGRAM(S): 2 INJECTION INTRAMUSCULAR; INTRAVENOUS; SUBCUTANEOUS at 21:17

## 2023-01-01 RX ADMIN — POLYETHYLENE GLYCOL 3350 17 GRAM(S): 17 POWDER, FOR SOLUTION ORAL at 12:02

## 2023-01-01 RX ADMIN — OXYCODONE HYDROCHLORIDE 2.5 MILLIGRAM(S): 5 TABLET ORAL at 06:10

## 2023-01-01 RX ADMIN — SERTRALINE 100 MILLIGRAM(S): 25 TABLET, FILM COATED ORAL at 13:34

## 2023-01-01 RX ADMIN — APIXABAN 5 MILLIGRAM(S): 2.5 TABLET, FILM COATED ORAL at 05:38

## 2023-01-01 RX ADMIN — FINASTERIDE 5 MILLIGRAM(S): 5 TABLET, FILM COATED ORAL at 12:29

## 2023-01-01 RX ADMIN — LIDOCAINE 1 PATCH: 4 CREAM TOPICAL at 13:04

## 2023-01-01 RX ADMIN — Medication 20 MILLIGRAM(S): at 17:29

## 2023-01-01 RX ADMIN — LIDOCAINE 1 PATCH: 4 CREAM TOPICAL at 12:59

## 2023-01-01 RX ADMIN — LIDOCAINE 1 PATCH: 4 CREAM TOPICAL at 19:14

## 2023-01-01 RX ADMIN — Medication 50 MILLIGRAM(S): at 05:37

## 2023-01-01 RX ADMIN — ROBINUL 0.2 MILLIGRAM(S): 0.2 INJECTION INTRAMUSCULAR; INTRAVENOUS at 07:28

## 2023-01-01 RX ADMIN — FINASTERIDE 5 MILLIGRAM(S): 5 TABLET, FILM COATED ORAL at 12:47

## 2023-01-01 RX ADMIN — Medication 50 MILLIGRAM(S): at 05:31

## 2023-01-01 RX ADMIN — SERTRALINE 100 MILLIGRAM(S): 25 TABLET, FILM COATED ORAL at 11:54

## 2023-01-01 RX ADMIN — PREGABALIN 1000 MICROGRAM(S): 225 CAPSULE ORAL at 13:04

## 2023-01-01 RX ADMIN — LIDOCAINE 1 PATCH: 4 CREAM TOPICAL at 12:47

## 2023-01-01 RX ADMIN — LIDOCAINE 1 PATCH: 4 CREAM TOPICAL at 00:27

## 2023-01-01 RX ADMIN — HYDROMORPHONE HYDROCHLORIDE 1 MILLIGRAM(S): 2 INJECTION INTRAMUSCULAR; INTRAVENOUS; SUBCUTANEOUS at 20:24

## 2023-01-11 NOTE — PATIENT PROFILE ADULT - NSASFUNCLEVELADLAMBULATE_GEN_A_NUR
If she did not respond to NSAID and steroid, she may need to come in for a re-eval 2 = assistive person/dizziness and frequent falls

## 2023-02-28 NOTE — PROGRESS NOTE ADULT - SUBJECTIVE AND OBJECTIVE BOX
PGY-1 Progress Note discussed with attending    PAGER #: [phone]] TILL 5:00 PM  PLEASE CONTACT ON CALL TEAM:  - On Call Team (Please refer to Hi) FROM 5:00 PM - 8:30PM  - Nightfloat Team FROM 8:30 -7:30 AM    INTERVAL HPI/OVERNIGHT EVENTS:   patient examined bedside, he is comfortable today , AAO x3 , psychaitry was consulted and patient was started on lexapro, neurololgy was consulted likely Slowly progressively worsening dementia.  Fits Alzheimer pattern with the type of hallucinations he has developed, sundowning, and functional gait disorder.  will be dc to follow up with cognitive neurology; at [facility] .Pt recommended home PT but the family doesnot want to take the patient home and are requesting DWAINE ,  is aware.       REVIEW OF SYSTEMS:  CONSTITUTIONAL: No fever, weight loss, or fatigue  RESPIRATORY: No cough, wheezing, chills or hemoptysis; No shortness of breath  CARDIOVASCULAR: No chest pain, palpitations, dizziness, or leg swelling  GASTROINTESTINAL: No abdominal pain. No nausea, vomiting, or hematemesis; No diarrhea or constipation. No melena or hematochezia.  GENITOURINARY: No dysuria or hematuria, urinary frequency  NEUROLOGICAL: No headaches, memory loss, loss of strength, numbness, or tremors  SKIN: No itching, burning, rashes, or lesions     MEDICATIONS  (STANDING):  enalapril 40 milliGRAM(s) Oral daily  enoxaparin Injectable 40 milliGRAM(s) SubCutaneous daily  escitalopram 5 milliGRAM(s) Oral daily  finasteride 5 milliGRAM(s) Oral daily  metoprolol tartrate 50 milliGRAM(s) Oral two times a day  sodium chloride 0.9%. 1000 milliLiter(s) (70 mL/Hr) IV Continuous <Continuous>  tamsulosin 0.4 milliGRAM(s) Oral at bedtime    MEDICATIONS  (PRN):      Vital Signs Last 24 Hrs  T(C): 36 (03 Feb 2021 08:14), Max: 36.9 (02 Feb 2021 16:28)  T(F): 96.8 (03 Feb 2021 08:14), Max: 98.5 (02 Feb 2021 16:28)  HR: 54 (03 Feb 2021 11:13) (54 - 104)  BP: 158/81 (03 Feb 2021 11:13) (123/54 - 168/92)  BP(mean): --  RR: 18 (03 Feb 2021 08:14) (17 - 18)  SpO2: 95% (03 Feb 2021 11:13) (91% - 99%)    PHYSICAL EXAMINATION:  GENERAL: NAD, well built  HEAD:  Atraumatic, Normocephalic  EYES:  conjunctiva and sclera clear  NECK: Supple, No JVD, Normal thyroid  CHEST/LUNG: Clear to auscultation. Clear to percussion bilaterally; No rales, rhonchi, wheezing, or rubs  HEART: Regular rate and rhythm; No murmurs, rubs, or gallops  ABDOMEN: Soft, Nontender, Nondistended; Bowel sounds present  NERVOUS SYSTEM:  Alert & Oriented X3,    EXTREMITIES:  2+ Peripheral Pulses, No clubbing, cyanosis, or edema  SKIN: warm dry                          13.1   7.65  )-----------( 129      ( 03 Feb 2021 06:13 )             41.6     02-03    139  |  107  |  22<H>  ----------------------------<  86  3.8   |  25  |  0.86    Ca    8.0<L>      03 Feb 2021 06:13  Phos  2.5     02-03  Mg     2.3     02-03    TPro  6.7  /  Alb  2.9<L>  /  TBili  1.1  /  DBili  x   /  AST  14  /  ALT  28  /  AlkPhos  101  02-03    LIVER FUNCTIONS - ( 03 Feb 2021 06:13 )  Alb: 2.9 g/dL / Pro: 6.7 g/dL / ALK PHOS: 101 U/L / ALT: 28 U/L DA / AST: 14 U/L / GGT: x                       CAPILLARY BLOOD GLUCOSE  CAPILLARY BLOOD GLUCOSE        CAPILLARY BLOOD GLUCOSE          RADIOLOGY & ADDITIONAL TESTS:                   intact

## 2023-03-27 NOTE — DISCHARGE NOTE NURSING/CASE MANAGEMENT/SOCIAL WORK - CAREGIVER RELATION TO PATIENT
"SUBJECTIVE:  HPI    Shun Bobby is a 18 y.o. male here for Follow-up (Vaccines).  He is doing well.  He reports that his medication is helping him focus with work.  He denies any medication-related side effects.      He does continue regular follow-up with Endocrinology and Cardiology for his other chronic health conditions.      He needs an updated this fact seen for school.  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      Shun's allergies, medications, history, and problem list were updated as appropriate.    ROS:  Pertinent ROS as above, otherwise negative    OBJECTIVE:  Vital signs  Visit Vitals  /84 (BP Location: Left arm, Patient Position: Sitting)   Pulse 99   Temp 98.6 °F (37 °C) (Oral)   Ht 5' 7.13" (1.705 m)   Wt 106.2 kg (234 lb 2.1 oz)   SpO2 98%   BMI 36.53 kg/m²          PHYSICAL EXAM:  General: Awake, alert, no acute distress, obese      ASSESSMENT/PLAN:  1. Attention deficit hyperactivity disorder (ADHD), predominantly inattentive type  Assessment & Plan:  Stable and controlled on methylphenidate extended release 27 mg daily      2. Primary hypertension  Assessment & Plan:  Stable and controlled on enalapril 10 mg daily     Followed by Cardiology      3. Acquired hypothyroidism  Assessment & Plan:  On levothyroxine 88 mcg daily     Followed by endocrinology      4. Mild intermittent asthma without complication  Assessment & Plan:  Mild intermittent, controlled with as needed albuterol      5. Encounter for immunization  -     (In Office Administered) Meningococcal Conjugate - MCV4O (MENVEO)    6. Attention deficit disorder, unspecified hyperactivity presence  -     methylphenidate HCl 27 MG CR tablet; Take 1 tablet (27 mg total) by mouth once daily.  Dispense: 30 tablet; Refill: 0            Follow Up:  No follow-ups on file.          " wife

## 2023-04-07 NOTE — ED PROVIDER NOTE - CLINICAL SUMMARY MEDICAL DECISION MAKING FREE TEXT BOX
77M presenting after a fall. confirmed with patient's son he is not on any anticoagulation. will get CT head and xray pelvis to assess for fracture or bleeding.

## 2023-04-07 NOTE — ED PROVIDER NOTE - NSFOLLOWUPINSTRUCTIONS_ED_ALL_ED_FT
You were seen in the emergency department after a fall.     Please follow-up with your primary care doctor in the next 24-48 hours.     If you have any worsening symptoms, severe headache, chest pain, dizziness, trouble breathing, please return to the emergency department.

## 2023-04-07 NOTE — ED PROVIDER NOTE - PATIENT PORTAL LINK FT
You can access the FollowMyHealth Patient Portal offered by [facility] by registering at the following website: [email or web address]. By joining [facility]’s FollowMyHealth portal, you will also be able to view your health information using other applications (apps) compatible with our system.

## 2023-04-07 NOTE — ED PROVIDER NOTE - OBJECTIVE STATEMENT
77M, pmh of BPH, HTN, right frontal shunt cath, presenting after a fall. wife reports the patient tripped and fell on a step while walking to dinner. denies losing consciousness. no vomiting or dizziness.

## 2023-04-07 NOTE — ED PROVIDER NOTE - PHYSICAL EXAMINATION
General: well appearing male, no acute distress   HEENT: normocephalic, atraumatic, EOMI  Respiratory: normal work of breathing  MSK: pelvis stable and non-tender, 5/5 strength in lower extremities    Skin: warm, dry   Neuro: A&Ox3  Psych: appropriate affect

## 2023-04-07 NOTE — ED ADULT TRIAGE NOTE - CHIEF COMPLAINT QUOTE
Was on the sofa and getting up slid down and fell, denies hitting head per EMS pt was on the chair when they came and pt color was pale, pt alert denies pain

## 2023-04-07 NOTE — ED ADULT TRIAGE NOTE - RESPIRATORY RATE (BREATHS/MIN)
From: Tamia Ruffin  To: Delvis Pabon MD  Sent: 5/22/2017 12:14 PM CDT  Subject: Rx renewal    I need Rx renewals on two of my medications.  Metoprolol 25mg and Klor-Con m10. Please fax to [facility] and let me  know when done. These should be for 90 days at a time for a year.  Thank you,  Tamia Ruffin  1946   16

## 2023-05-01 NOTE — ED ADULT NURSE NOTE - OBJECTIVE STATEMENT
Pt presents to ED with c/o weakness, loss of appetite and pain to left side of body x 4 days. Denies fever, nausea or vomiting.

## 2023-05-01 NOTE — ED PROVIDER NOTE - PROGRESS NOTE DETAILS
EKG - nsr, rate 102, QTc 461, no ischemic changes, no ectopy S/o to Dr Chatman to  labs, imaging, endorsement to MAR; already endorsed to Dr Newman Chatman: stable. no acute injury on imaging, ua no infection. per dr osorio spoke with dr holloway and to be admitted with dr hawk

## 2023-05-01 NOTE — PATIENT PROFILE ADULT - FALL HARM RISK - HARM RISK INTERVENTIONS
Assistance with ambulation/Assistance OOB with selected safe patient handling equipment/Communicate Risk of Fall with Harm to all staff/Discuss with provider need for PT consult/Monitor for mental status changes/Monitor gait and stability/Reinforce activity limits and safety measures with patient and family/Reorient to person, place and time as needed/Review medications for side effects contributing to fall risk/Sit up slowly, dangle for a short time, stand at bedside before walking/Tailored Fall Risk Interventions/Toileting schedule using arm’s reach rule for commode and bathroom/Use of alarms - bed, chair and/or voice tab/Visual Cue: Yellow wristband and red socks/Bed in lowest position, wheels locked, appropriate side rails in place/Call bell, personal items and telephone in reach/Instruct patient to call for assistance before getting out of bed or chair/Non-slip footwear when patient is out of bed/Elizabeth to call system/Physically safe environment - no spills, clutter or unnecessary equipment/Purposeful Proactive Rounding/Room/bathroom lighting operational, light cord in reach

## 2023-05-01 NOTE — H&P ADULT - PROBLEM SELECTOR PLAN 1
pt with increased pain, incontinence, confusion at home  family is unable to care for patient at home with current hospice services   would like disposition to hospice facility vs. IPU  family would also like to continue current meds and blood draws at this time as well as any non-invasive treatments  c/w pain meds and supportive care   MOLST form from hospice in chart  CM/SW consulted   Palliative care consulted

## 2023-05-01 NOTE — ED PROVIDER NOTE - CARE PLAN
1 Principal Discharge DX:	Ambulatory dysfunction   Principal Discharge DX:	Ambulatory dysfunction  Secondary Diagnosis:	Adult failure to thrive

## 2023-05-01 NOTE — H&P ADULT - HISTORY OF PRESENT ILLNESS
77 year old male, from home hospice, with PMHx HTN, HLD, BPH, ?arrythmia (?eliquis), colon cancer w/ mets (s/p chemo and surgery), and NPH (w/  shunt), dementia presented with left sided body pain, generalized weakness, and unable to ambulate for 3 days. Pt AAOx1, collateral obtained from daughter and HCP Irina at bedside. Per daughter, pt was enrolled in [facility] hospice in October due to decline in mental and functional status after surgery for colon cancer when decision was made to focus   77 year old male, from home hospice, with PMHx HTN, HLD, BPH, PAF (on eliquis), colon cancer w/ mets (s/p chemo and surgery), and NPH (w/  shunt), dementia presented with left sided body pain, generalized weakness, and unable to ambulate for 3 days. Pt AAOx1, collateral obtained from daughter and HCP Irina at bedside. Per daughter, pt was enrolled in [facility] in October due to decline in mental and functional status after surgery for colon cancer, when decision was made to focus on noninvasive intervention.   Patient reports headache, dizziness, chest pain, and right lower extremity pain. Patient denies all other complaints including fever/chills, cough, n/v/d/c, dysuria or leg swelling. NKDA    77 year old male, from home hospice, with PMHx HTN, HLD, BPH, PAF (on eliquis), colon cancer w/ mets (s/p chemo and surgery), and NPH (w/  shunt), dementia presented with left sided body pain, generalized weakness, and unable to ambulate for 3 days. Pt AAOx1, collateral obtained from daughter and HCP Irina at bedside. Per daughter, pt was enrolled in [facility] in October 2022 due to decline in mental and functional status after surgery for colon cancer, when decision was made to focus on noninvasive intervention. Patient has had increased pain at home, increased urinary and bowel incontinence and has not been able to ambulate due to pain. Patient reports headache, dizziness, chest pain, and right lower extremity pain. Patient denies all other complaints including fever/chills, cough, n/v/d/c, dysuria or leg swelling. NKDA

## 2023-05-01 NOTE — H&P ADULT - NSICDXPASTMEDICALHX_GEN_ALL_CORE_FT
PAST MEDICAL HISTORY:  (Idiopathic) normal pressure hydrocephalus     BPH (benign prostatic hyperplasia)     Depression     H/O hydrocephalus     HTN (hypertension)     Lumbar stenosis s/p sx 10/2017    Paroxysmal atrial fibrillation

## 2023-05-01 NOTE — ED ADULT TRIAGE NOTE - CHIEF COMPLAINT QUOTE
DAUGHTER REPORTS PT S/P FALL 2 WEEKS AGO, DENIES LOC. PT C/O LEFT LEG,  LEFT SIDE BODY PAIN X 4 DAYS.

## 2023-05-01 NOTE — ED PROVIDER NOTE - OBJECTIVE STATEMENT
76 yo M pmh of dementia, HTN, HLD, on eliquis  Presents with daughter who notes L sided body pain, generalized weakness, and unable to ambulate x 3 days  Pt unable to provide meaningful hx   DNR/DNI enrolled in [facility] hospice program

## 2023-05-01 NOTE — H&P ADULT - ASSESSMENT
77M from home hospice, w/ PMHx HTN, HLD, BPH, PAF (on eliquis), colon cancer w/ mets (s/p chemo and surgery), and NPH (w/  shunt), dementia presents with intractable pain and increased needs at home. Admitted to medicine for pain control hospice disposition.

## 2023-05-01 NOTE — ED PROVIDER NOTE - PHYSICAL EXAMINATION
GENERAL: well appearing, no acute distress   HEAD: atraumatic   EYES: EOMI   ENT: moist oral mucosa   CARDIAC: regular rate  RESPIRATORY: no increased work of breathing   ABDO: soft NTND  MUSCULOSKELETAL: no deformity; full but painful L hip ROM  NEUROLOGICAL: alert, spontaneous movement of extremities   SKIN: no visible rash  PSYCHIATRIC: cooperative

## 2023-05-01 NOTE — H&P ADULT - NSHPPHYSICALEXAM_GEN_ALL_CORE
T(C): 37.1 (05-01-23 @ 15:32), Max: 37.1 (05-01-23 @ 15:32)  HR: 110 (05-01-23 @ 15:32) (102 - 110)  BP: 175/93 (05-01-23 @ 15:32) (137/85 - 175/93)  RR: 18 (05-01-23 @ 15:32) (18 - 20)  SpO2: 95% (05-01-23 @ 15:32) (92% - 95%)    GENERAL: Elderly male, NAD  HEAD: normocephalic, atraumatic  EYES: sclera clear, no exudates  ENMT: oropharynx clear without erythema, no exudates, moist mucous membranes  NECK: supple, soft, no thyromegaly noted  LUNGS: clear to auscultation bilaterally, no wheezing, rhonchi or rales appreciated  HEART: S1/S2, regular rate and rhythm, no murmurs noted, no lower extremity edema  GASTROINTESTINAL: abdomen is soft, +diffusely tender on deep palpation, normoactive bowel sounds, no palpable masses  INTEGUMENT: good skin turgor, +senile lesions noted  MUSCULOSKELETAL: no clubbing or cyanosis, no obvious deformity  NEUROLOGIC: AAO x1, no obvious sensorimotor deficits noted

## 2023-05-01 NOTE — H&P ADULT - PROBLEM SELECTOR PLAN 6
pt with gait instability, urinary incontinence, dementia pt with gait instability, urinary incontinence, dementia  s/p  shunt   c/w management as above

## 2023-05-01 NOTE — ED PROVIDER NOTE - CLINICAL SUMMARY MEDICAL DECISION MAKING FREE TEXT BOX
Elderly M from home with generalized weakness, cannot ambulate  Non focal neuro exam  Plan - CT head, xrays, labs, EKG, admit   Spoke with Dr Moreno who states since non [facility] hospice program pt can be admitted to medicine; endorsed to unattached Dr Newman

## 2023-05-02 NOTE — PROGRESS NOTE ADULT - SUBJECTIVE AND OBJECTIVE BOX
Patient is a 77y old  Male who presents with a chief complaint of Intractable pain (01 May 2023 19:07).  Seen and examined at bedside.  Noted lethargic, minimally verbal, in mild resp distress with O2 sat 90% on RA.  Pt. denies pain at this time, admits to shortness of breath.      INTERVAL HPI/OVERNIGHT EVENTS: pt seen and examined    MEDICATIONS  (STANDING):  amLODIPine   Tablet 5 milliGRAM(s) Oral daily  apixaban 5 milliGRAM(s) Oral every 12 hours  atorvastatin 20 milliGRAM(s) Oral at bedtime  cholecalciferol 5000 Unit(s) Oral daily  cyanocobalamin 1000 MICROGram(s) Oral daily  enalapril 20 milliGRAM(s) Oral daily  finasteride 5 milliGRAM(s) Oral daily  metoprolol succinate ER 50 milliGRAM(s) Oral daily  sertraline 100 milliGRAM(s) Oral daily    MEDICATIONS  (PRN):  acetaminophen     Tablet .. 650 milliGRAM(s) Oral every 6 hours PRN Temp greater or equal to 38C (100.4F), Mild Pain (1 - 3)  aluminum hydroxide/magnesium hydroxide/simethicone Suspension 30 milliLiter(s) Oral every 4 hours PRN Dyspepsia  HYDROmorphone  Injectable 2 milliGRAM(s) IV Push every 4 hours PRN Severe Pain (7 - 10)  ketorolac   Injectable 15 milliGRAM(s) IV Push every 6 hours PRN Moderate Pain (4 - 6)  LORazepam   Injectable 0.5 milliGRAM(s) IV Push every 4 hours PRN Anxiety  melatonin 3 milliGRAM(s) Oral at bedtime PRN Insomnia  ondansetron Injectable 4 milliGRAM(s) IV Push every 8 hours PRN Nausea and/or Vomiting      __________________________________________________  ·	REVIEW OF SYSTEMS:  Unable to obtain due to mentation     Vital Signs Last 24 Hrs  T(C): 36.7 (02 May 2023 05:09), Max: 37.1 (01 May 2023 15:32)  T(F): 98.1 (02 May 2023 05:09), Max: 98.8 (01 May 2023 20:37)  HR: 96 (02 May 2023 05:09) (91 - 110)  BP: 149/80 (02 May 2023 05:09) (137/85 - 175/93)  BP(mean): --  RR: 17 (02 May 2023 05:09) (17 - 20)  SpO2: 93% (02 May 2023 05:09) (92% - 95%)    Parameters below as of 02 May 2023 05:09  Patient On (Oxygen Delivery Method): room air        ________________________________________________  PHYSICAL EXAM:  Ill appearing, minimally verbal, lethargic  GENERAL: NAD  HEENT: Normocephalic;  conjunctivae and sclerae clear; moist mucous membranes;   NECK : supple  CHEST/LUNG: dec breath sounds at bases  HEART: S1 S2  regular; no murmurs, gallops or rubs  ABDOMEN: Soft, Nontender, Nondistended; Bowel sounds present  EXTREMITIES: no cyanosis; no edema; no calf tenderness  SKIN: warm and dry; no rash  NERVOUS SYSTEM:  Lethargic  _________________________________________________  LABS:                        8.3    14.71 )-----------( 347      ( 02 May 2023 07:19 )             26.8     05-02    137  |  105  |  21<H>  ----------------------------<  125<H>  4.1   |  28  |  0.64    Ca    9.5      02 May 2023 07:19  Phos  3.0     05-02  Mg     2.2     05-02    TPro  7.3  /  Alb  1.9<L>  /  TBili  0.5  /  DBili  x   /  AST  87<H>  /  ALT  89<H>  /  AlkPhos  213<H>  05-01      Urinalysis Basic - ( 01 May 2023 16:10 )    Color: Yellow / Appearance: Clear / S.025 / pH: x  Gluc: x / Ketone: Negative  / Bili: Negative / Urobili: 1 mg/dL   Blood: x / Protein: 30 mg/dL / Nitrite: Negative   Leuk Esterase: Trace / RBC: 2-5 /HPF / WBC 3-5 /HPF   Sq Epi: x / Non Sq Epi: x / Bacteria: Few /HPF      CAPILLARY BLOOD GLUCOSE      POCT Blood Glucose.: 148 mg/dL (01 May 2023 13:37)    RADIOLOGY & ADDITIONAL TESTS:    < from: Xray Chest 1 View AP/PA (23 @ 16:48) >    ACC: [id number] EXAM:  XR CHEST AP OR PA 1V   ORDERED BY: TRISTA DUPONT     PROCEDURE DATE:  2023          INTERPRETATION:  INDICATION: Fall    COMPARISON: 2021    FINDINGS:  An AP semiupright view of the chest demonstrates a right-sidedVP shunt,   unchanged. There is a right-sided infusion catheter terminating in the   right atrium without pneumothorax. There is a shallow inspiratory effort   and while no focal infiltrates are seen and there is no pneumothorax,   there is a new right pleural effusion. There is no pleural effusion on   the left. There is no hilar or mediastinal widening. The cardiac   silhouette may be magnified by technique. There is no pulmonary edema.   The bony thorax remains unchanged.    IMPRESSION:  1. There is a new right pleural effusion with no evidence of pneumonia or   CHF.  2. There is a new right sided infusion catheter, terminating in the right   atrium, without pneumothorax.  3. Right-sided  shunt, unchanged.    < end of copied text >    < from: Xray Pelvis AP only (23 @ 16:47) >  ACC: [id number] EXAM:  XR PELVIS AP ONLY 1-2 VIEWS   ORDERED BY: TRISTA DUPONT     PROCEDURE DATE:  2023          INTERPRETATION:  An AP view of the pelvis was performed to evaluate for   fracture.    Comparison is made to 2023.    There is no evidence of acute fracture or dislocation. There is no   diastases of the SI joints or symphysis pubis. Both pubic rings are   grossly intact. There are only mild degenerative changes of both hips.   Calcifications adjacent to the peripheral acetabulum on both sides could   represent accessory ossicles or related to an old remote labral injury.   The patient is undergone lower lumbar midline laminectomy with surgical   fusion, including metallic hardware and apparatus inserting on both sides   at L4-L5. There are surgical clips within the left para midline pelvis.    IMPRESSION:  1. No acute fracture or dislocation is seen and there is no significant   change from the prior exam.  2. Postoperative findings as outlined above, remain stable.  3. Mild degenerative changes of both hips with calcifications adjacent to   the acetabular roof on both sides, consistent with either accessory   ossicles or old remote labral injury.    < end of copied text >    < from: CT Head No Cont (23 @ 16:47) >    ACC: [id number] EXAM:  CT BRAIN   ORDERED BY: TRISTA DUPONT     PROCEDURE DATE:  2023          INTERPRETATION:  Exam Date: 2023 4:47 PM    CT head without IV contrast    CLINICAL INFORMATION: fall, eval for ICH    TECHNIQUE: Contiguous axial sections were obtained through the head.     Coronal and sagittal reformats were obtained.    COMPARISON: CT head 2023    FINDINGS:  Right frontal approach ventriculostomy catheter with tip at the septum   pellucidum is essentially unchanged. Ventricular size is unchanged.   Generalized cerebral volume loss is reidentified. Arachnoid cyst in the   left middle cranial fossa is reidentified.  There is no CT evidence for   acute cerebral cortical infarct. There is no evidence of hemorrhage.   There is periventricular and subcortical white matter hypoattenuation,    most compatible with chronic microvascular ischemic changes.   No mass   effect is found in the brain.  There is no midline shift or herniation   pattern.      The visualized portions of the paranasal sinuses and mastoid air cells   are clear.    IMPRESSION:    No evidence of acute intracranial abnormality.  No evidence of hemorrhage.    Chronic changes as above.    < end of copied text >    Imaging Personally Reviewed:  YES/NO    Consultant(s) Notes Reviewed:   YES/ No    Care Discussed with Consultants :     Plan of care was discussed with patient and /or primary care giver; all questions and concerns were addressed and care was aligned with patient's wishes.

## 2023-05-02 NOTE — PROGRESS NOTE ADULT - PROBLEM SELECTOR PLAN 2
pt with increased pain, incontinence, confusion at home  -Family is unable to care for patient at home with current hospice services, would like disposition to hospice facility vs. IPU  -Family would  like to continue current meds and blood draws at this time as well as any non-invasive treatments  -Cont pain mgnt and supportive care   -MOLST form from hospice in chart  -CM/SW consulted   -Palliative following

## 2023-05-02 NOTE — CHART NOTE - NSCHARTNOTEFT_GEN_A_CORE
Spoke to pt.'s daughter Irina (HCP) who communicated concern re pt.'s overall declining condition and left sided body pain.  Reportedly pt. is now with decreased appetite and generalized weakness.  Pt.'s daughter acknowledges dementia trajectory, would like to know if an infectious process is taking place.  Explained that so far no obvious signs of infection are noted and that cultures are still pending.  Also will add Ca markers to am blood work for pt. with Hx. colon Ca with mets (years ago per daughter).

## 2023-05-02 NOTE — PROGRESS NOTE ADULT - PROBLEM SELECTOR PLAN 3
c/w ACEi, metoprolol and amlodipine b/p well controlled at this time  -Cont ACEi, metoprolol and amlodipine

## 2023-05-02 NOTE — PROGRESS NOTE ADULT - SUBJECTIVE AND OBJECTIVE BOX
NP Note discussed with  Primary Attending    Patient is a 77y old  Male who presents with a chief complaint of Intractable pain (01 May 2023 19:07).  Seen and examined at bedside.  Noted lethargic, minimally verbal, in mild resp distress with O2 sat 90% on RA.  Pt. denies pain at this time, admits to shortness of breath.      INTERVAL HPI/OVERNIGHT EVENTS: no new complaints    MEDICATIONS  (STANDING):  amLODIPine   Tablet 5 milliGRAM(s) Oral daily  apixaban 5 milliGRAM(s) Oral every 12 hours  atorvastatin 20 milliGRAM(s) Oral at bedtime  cholecalciferol 5000 Unit(s) Oral daily  cyanocobalamin 1000 MICROGram(s) Oral daily  enalapril 20 milliGRAM(s) Oral daily  finasteride 5 milliGRAM(s) Oral daily  metoprolol succinate ER 50 milliGRAM(s) Oral daily  sertraline 100 milliGRAM(s) Oral daily    MEDICATIONS  (PRN):  acetaminophen     Tablet .. 650 milliGRAM(s) Oral every 6 hours PRN Temp greater or equal to 38C (100.4F), Mild Pain (1 - 3)  aluminum hydroxide/magnesium hydroxide/simethicone Suspension 30 milliLiter(s) Oral every 4 hours PRN Dyspepsia  HYDROmorphone  Injectable 2 milliGRAM(s) IV Push every 4 hours PRN Severe Pain (7 - 10)  ketorolac   Injectable 15 milliGRAM(s) IV Push every 6 hours PRN Moderate Pain (4 - 6)  LORazepam   Injectable 0.5 milliGRAM(s) IV Push every 4 hours PRN Anxiety  melatonin 3 milliGRAM(s) Oral at bedtime PRN Insomnia  ondansetron Injectable 4 milliGRAM(s) IV Push every 8 hours PRN Nausea and/or Vomiting      __________________________________________________  ·	REVIEW OF SYSTEMS:  Unable to obtain due to mentation     Vital Signs Last 24 Hrs  T(C): 36.7 (02 May 2023 05:09), Max: 37.1 (01 May 2023 15:32)  T(F): 98.1 (02 May 2023 05:09), Max: 98.8 (01 May 2023 20:37)  HR: 96 (02 May 2023 05:09) (91 - 110)  BP: 149/80 (02 May 2023 05:09) (137/85 - 175/93)  BP(mean): --  RR: 17 (02 May 2023 05:09) (17 - 20)  SpO2: 93% (02 May 2023 05:09) (92% - 95%)    Parameters below as of 02 May 2023 05:09  Patient On (Oxygen Delivery Method): room air        ________________________________________________  PHYSICAL EXAM:  Ill appearing, minimally verbal, lethargic  GENERAL: NAD  HEENT: Normocephalic;  conjunctivae and sclerae clear; moist mucous membranes;   NECK : supple  CHEST/LUNG: Shallow resp pattern, sat 90% on RA  HEART: S1 S2  regular; no murmurs, gallops or rubs  ABDOMEN: Soft, Nontender, Nondistended; Bowel sounds present  EXTREMITIES: no cyanosis; no edema; no calf tenderness  SKIN: warm and dry; no rash  NERVOUS SYSTEM:  Lethargic  _________________________________________________  LABS:                        8.3    14.71 )-----------( 347      ( 02 May 2023 07:19 )             26.8     05-    137  |  105  |  21<H>  ----------------------------<  125<H>  4.1   |  28  |  0.64    Ca    9.5      02 May 2023 07:19  Phos  3.0     05-02  Mg     2.2     05-    TPro  7.3  /  Alb  1.9<L>  /  TBili  0.5  /  DBili  x   /  AST  87<H>  /  ALT  89<H>  /  AlkPhos  213<H>  05-      Urinalysis Basic - ( 01 May 2023 16:10 )    Color: Yellow / Appearance: Clear / S.025 / pH: x  Gluc: x / Ketone: Negative  / Bili: Negative / Urobili: 1 mg/dL   Blood: x / Protein: 30 mg/dL / Nitrite: Negative   Leuk Esterase: Trace / RBC: 2-5 /HPF / WBC 3-5 /HPF   Sq Epi: x / Non Sq Epi: x / Bacteria: Few /HPF      CAPILLARY BLOOD GLUCOSE      POCT Blood Glucose.: 148 mg/dL (01 May 2023 13:37)    RADIOLOGY & ADDITIONAL TESTS:    < from: Xray Chest 1 View AP/PA (23 @ 16:48) >    ACC: [id number] EXAM:  XR CHEST AP OR PA 1V   ORDERED BY: TRISTA DUPONT     PROCEDURE DATE:  2023          INTERPRETATION:  INDICATION: Fall    COMPARISON: 2021    FINDINGS:  An AP semiupright view of the chest demonstrates a right-sidedVP shunt,   unchanged. There is a right-sided infusion catheter terminating in the   right atrium without pneumothorax. There is a shallow inspiratory effort   and while no focal infiltrates are seen and there is no pneumothorax,   there is a new right pleural effusion. There is no pleural effusion on   the left. There is no hilar or mediastinal widening. The cardiac   silhouette may be magnified by technique. There is no pulmonary edema.   The bony thorax remains unchanged.    IMPRESSION:  1. There is a new right pleural effusion with no evidence of pneumonia or   CHF.  2. There is a new right sided infusion catheter, terminating in the right   atrium, without pneumothorax.  3. Right-sided  shunt, unchanged.    < end of copied text >    < from: Xray Pelvis AP only (23 @ 16:47) >  ACC: [id number] EXAM:  XR PELVIS AP ONLY 1-2 VIEWS   ORDERED BY: TRISTA DUPONT     PROCEDURE DATE:  2023          INTERPRETATION:  An AP view of the pelvis was performed to evaluate for   fracture.    Comparison is made to 2023.    There is no evidence of acute fracture or dislocation. There is no   diastases of the SI joints or symphysis pubis. Both pubic rings are   grossly intact. There are only mild degenerative changes of both hips.   Calcifications adjacent to the peripheral acetabulum on both sides could   represent accessory ossicles or related to an old remote labral injury.   The patient is undergone lower lumbar midline laminectomy with surgical   fusion, including metallic hardware and apparatus inserting on both sides   at L4-L5. There are surgical clips within the left para midline pelvis.    IMPRESSION:  1. No acute fracture or dislocation is seen and there is no significant   change from the prior exam.  2. Postoperative findings as outlined above, remain stable.  3. Mild degenerative changes of both hips with calcifications adjacent to   the acetabular roof on both sides, consistent with either accessory   ossicles or old remote labral injury.    < end of copied text >    < from: CT Head No Cont (23 @ 16:47) >    ACC: [id number] EXAM:  CT BRAIN   ORDERED BY: TRISTA DUPONT     PROCEDURE DATE:  2023          INTERPRETATION:  Exam Date: 2023 4:47 PM    CT head without IV contrast    CLINICAL INFORMATION: fall, eval for ICH    TECHNIQUE: Contiguous axial sections were obtained through the head.     Coronal and sagittal reformats were obtained.    COMPARISON: CT head 2023    FINDINGS:  Right frontal approach ventriculostomy catheter with tip at the septum   pellucidum is essentially unchanged. Ventricular size is unchanged.   Generalized cerebral volume loss is reidentified. Arachnoid cyst in the   left middle cranial fossa is reidentified.  There is no CT evidence for   acute cerebral cortical infarct. There is no evidence of hemorrhage.   There is periventricular and subcortical white matter hypoattenuation,    most compatible with chronic microvascular ischemic changes.   No mass   effect is found in the brain.  There is no midline shift or herniation   pattern.      The visualized portions of the paranasal sinuses and mastoid air cells   are clear.    IMPRESSION:    No evidence of acute intracranial abnormality.  No evidence of hemorrhage.    Chronic changes as above.    < end of copied text >    Imaging Personally Reviewed:  YES/NO    Consultant(s) Notes Reviewed:   YES/ No    Care Discussed with Consultants :     Plan of care was discussed with patient and /or primary care giver; all questions and concerns were addressed and care was aligned with patient's wishes.

## 2023-05-03 NOTE — CONSULT NOTE ADULT - PROBLEM SELECTOR RECOMMENDATION 2
Clinical evidence indicates that the patient has Severe protein calorie malnutrition/ 3rd degree. Albumin 1.9.  Moderate  po intake prior to admission.     In context of  Chronic Illness (>1 month)    Energy/Food intake <50% of estimated energy requirement >5 days  Weight loss: Moderate - severe   Body Fat loss: Severe   Cachexia, temporal wasting,  muscle atrophy  Muscle mass loss: Severe    Fluid Accumulation: Severe pleural effusions   Strength: weakened severe bedbound    Recommend:   pleasure feeds as tolerated - aspiration precautions, careful hand-feeding, teaching to caregivers  nutritional supplements as tolerated, nutrition consult Pt appeared comfortable.  Reports no pain at the time of exam.  CPOT 0    -d'c Dilaudid 2 mg IVP q4h  -D'c Toradol   -start lidocaine patch  -start Oxycodone 2.5 mg po q4 for moderate pain  -start Oxycodone 5 mg po q4 for severe pain   -Bowel regimen while receiving opioids

## 2023-05-03 NOTE — PROGRESS NOTE ADULT - PROBLEM SELECTOR PLAN 2
Family has opted for DNR/DNI/DNH with no labs and comfort measures  SW consulted  Palliative care following  Recommendations appreciated

## 2023-05-03 NOTE — CONSULT NOTE ADULT - CONVERSATION DETAILS
Spoke with the pt's daughter Irina via phone, discussed his current clinical condition and goals of care.  She endorsed the pt's health has been declining and he is requiring more assistance and her family is not prepared to provide appropriate care any more.  Hospice services were good, so they want him to continue with the support of hospice.  They would like him to be place in a LTC facility w hospice, preferably [facility].  Discussed risks/benefits of LST such as CPR/ intubation, artificial nutrition and PEG in the context of advanced dementia and debility. Family aware these invasive measures will not optimize the pt's life but may cause more stress and pain.  MOLST drafted;  DNR/DNI/no feeding tube/comfort measures only/DNH/ no labs.  Family educated as to what to expect.  Questions answered.  Emotional support provided.   Goal is for comfort.  Patient assessment and plan discussed w primary team.     Met with Damaris Truong's HHA at the bedside, she provided collateral information

## 2023-05-03 NOTE — CONSULT NOTE ADULT - SUBJECTIVE AND OBJECTIVE BOX
[facility] Geriatric and Palliative Consult Service:  Lesli Moreno DO: cell ([phone])  Usman García MD: cell ([phone])  Andriy Mata NP: cell ([phone])   Laura Dangelo DNP: cell ([phone])  Trev Leesuellen LMSW: cell ([phone])   Mireya Lyon NP: via [employer] Teams    Can contact any Palliative Team member via [employer] Teams for consults and questions      HPI:  77 year old male, from home hospice, with PMHx HTN, HLD, BPH, PAF (on eliquis), colon cancer w/ mets (s/p chemo and surgery), and NPH (w/  shunt), dementia presented with left sided body pain, generalized weakness, and unable to ambulate for 3 days. Pt AAOx1, collateral obtained from daughter and HCP Irina at bedside. Per daughter, pt was enrolled in [facility] in 2022 due to decline in mental and functional status after surgery for colon cancer, when decision was made to focus on noninvasive intervention. Patient has had increased pain at home, increased urinary and bowel incontinence and has not been able to ambulate due to pain. Patient reports headache, dizziness, chest pain, and right lower extremity pain. Patient denies all other complaints including fever/chills, cough, n/v/d/c, dysuria or leg swelling. NKDA    (01 May 2023 19:07)    Interval hx: Pt is Aox1, confused.  AMRITA Ocampo present at the bedside.   Ronna ID# [id number] facilitated conversation and exam      PAST MEDICAL & SURGICAL HISTORY:  HTN (hypertension)      BPH (benign prostatic hyperplasia)      Lumbar stenosis  s/p sx 10/2017      (Idiopathic) normal pressure hydrocephalus      H/O hydrocephalus      Paroxysmal atrial fibrillation      Depression      SS (spinal stenosis)  lumbar vetebral fusion L3- L4, L4-L5, L5-S1      S/P cataract surgery          SOCIAL HISTORY:    Admitted from:  home w family, pt was on home hospice with [facility] has HHA 4 hours daily   Pt is    [ none ] Substance abuse, [ none ] Tobacco hx, [ none ] Alcohol hx, [ none ] Home Opioid Hx    FAMILY HISTORY:  FH: HTN (hypertension)  parents     unable to obtain from patient due to poor mentation, family unable to give information, see H&P for history  Baseline ADLs (prior to admission): bedbound, total care    Allergies    No Known Allergies    Intolerances      Present Symptoms: Mild, Moderate, Severe  Unable to obtain due to poor mentation]   CPOT 0    CPOT:    https://www.[facility].org/getattachment/[email or web address]/Critical-Care-Pain-Observation-Tool-(CPOT)  PAIN AD Score:   [email or web address].pdf (press ctrl +  left click to view)      MEDICATIONS  (STANDING):  amLODIPine   Tablet 5 milliGRAM(s) Oral daily  apixaban 5 milliGRAM(s) Oral every 12 hours  atorvastatin 20 milliGRAM(s) Oral at bedtime  cholecalciferol 5000 Unit(s) Oral daily  cyanocobalamin 1000 MICROGram(s) Oral daily  enalapril 20 milliGRAM(s) Oral every 12 hours  finasteride 5 milliGRAM(s) Oral daily  metoprolol succinate ER 50 milliGRAM(s) Oral daily  sertraline 100 milliGRAM(s) Oral daily    MEDICATIONS  (PRN):  acetaminophen     Tablet .. 650 milliGRAM(s) Oral every 6 hours PRN Temp greater or equal to 38C (100.4F), Mild Pain (1 - 3)  aluminum hydroxide/magnesium hydroxide/simethicone Suspension 30 milliLiter(s) Oral every 4 hours PRN Dyspepsia  HYDROmorphone  Injectable 2 milliGRAM(s) IV Push every 4 hours PRN Severe Pain (7 - 10)  ketorolac   Injectable 15 milliGRAM(s) IV Push every 6 hours PRN Moderate Pain (4 - 6)  LORazepam   Injectable 0.5 milliGRAM(s) IV Push every 4 hours PRN Anxiety  melatonin 3 milliGRAM(s) Oral at bedtime PRN Insomnia  ondansetron Injectable 4 milliGRAM(s) IV Push every 8 hours PRN Nausea and/or Vomiting      PHYSICAL EXAM:  Vital Signs Last 24 Hrs  T(C): 37.2 (03 May 2023 05:01), Max: 37.2 (03 May 2023 05:01)  T(F): 99 (03 May 2023 05:01), Max: 99 (03 May 2023 05:01)  HR: 86 (03 May 2023 05:01) (81 - 90)  BP: 120/77 (03 May 2023 05:01) (120/77 - 139/87)  BP(mean): --  RR: 18 (03 May 2023 05:01) (17 - 18)  SpO2: 98% (03 May 2023 05:01) (97% - 98%)    Parameters below as of 03 May 2023 05:01  Patient On (Oxygen Delivery Method): nasal cannula  O2 Flow (L/min): 2      Palliative Performance Scale/Karnofsky Score: 30%  [email or web address]_[email or web address]    General: Chronically ill appearing, AOx1, confused.  NAD    HEENT: no abnormal lesion, dry mouth, neck supple  Lungs: decreased bases, unlabored on NC  CV: RRR, S1S2  GI: soft non distended non tender  incontinent  : incontinent    Musculoskeletal: weakness x4, bedbound, no edema  Skin: no abnormal skin lesions, poor skin turgor  Neuro: unable to follow commands  Oral intake ability: moderate po intake    LABS:                        8.2    13.11 )-----------( 335      ( 03 May 2023 06:52 )             27.4     05-03    137  |  104  |  27<H>  ----------------------------<  108<H>  4.0   |  29  |  0.79    Ca    9.2      03 May 2023 06:52  Phos  3.0     05-02  Mg     2.2     05-02    TPro  7.3  /  Alb  1.9<L>  /  TBili  0.5  /  DBili  x   /  AST  87<H>  /  ALT  89<H>  /  AlkPhos  213<H>  -    Urinalysis Basic - ( 01 May 2023 16:10 )    Color: Yellow / Appearance: Clear / S.025 / pH: x  Gluc: x / Ketone: Negative  / Bili: Negative / Urobili: 1 mg/dL   Blood: x / Protein: 30 mg/dL / Nitrite: Negative   Leuk Esterase: Trace / RBC: 2-5 /HPF / WBC 3-5 /HPF   Sq Epi: x / Non Sq Epi: x / Bacteria: Few /HPF    < from: CT Head No Cont (23 @ 16:47) >  ACC: [id number] EXAM:  CT BRAIN   ORDERED BY: TRISTA DUPONT     PROCEDURE DATE:  2023          INTERPRETATION:  Exam Date: 2023 4:47 PM    CT head without IV contrast    CLINICAL INFORMATION: fall, eval for ICH    TECHNIQUE: Contiguous axial sections were obtained through the head.     Coronal and sagittal reformats were obtained.    COMPARISON: CT head 2023    FINDINGS:  Right frontal approach ventriculostomy catheter with tip at the septum   pellucidum is essentially unchanged. Ventricular size is unchanged.   Generalized cerebral volume loss is reidentified. Arachnoid cyst in the   left middle cranial fossa is reidentified.  There is no CT evidence for   acute cerebral cortical infarct. There is no evidence of hemorrhage.   There is periventricular and subcortical white matter hypoattenuation,    most compatible with chronic microvascular ischemic changes.   No mass   effect is found in the brain.  There is no midline shift or herniation   pattern.      The visualized portions of the paranasal sinuses and mastoid air cells   are clear.    IMPRESSION:    No evidence of acute intracranial abnormality.  No evidence of hemorrhage.    Chronic changes as above.    < end of copied text >      RADIOLOGY & ADDITIONAL STUDIES: reviewed  ADVANCED DIRECTIVES:  DNR/DNI on file         [facility] Geriatric and Palliative Consult Service:  Lesli Moreno DO: cell ([phone])  Usman García MD: cell ([phone])  Andriy Mata NP: cell ([phone])   Laura Dangelo DNP: cell ([phone])  Trev Leesuellen LMSW: cell ([phone])   Mireya Lyon NP: via [employer] Teams    Can contact any Palliative Team member via [employer] Teams for consults and questions      HPI:  77 year old male, from home hospice, with PMHx HTN, HLD, BPH, PAF (on eliquis), colon cancer w/ mets (s/p chemo and surgery), and NPH (w/  shunt), dementia presented with left sided body pain, generalized weakness, and unable to ambulate for 3 days. Pt AAOx1, collateral obtained from daughter and HCP Irina at bedside. Per daughter, pt was enrolled in [facility] in 2022 due to decline in mental and functional status after surgery for colon cancer, when decision was made to focus on noninvasive intervention. Patient has had increased pain at home, increased urinary and bowel incontinence and has not been able to ambulate due to pain. Patient reports headache, dizziness, chest pain, and right lower extremity pain. Patient denies all other complaints including fever/chills, cough, n/v/d/c, dysuria or leg swelling. NKDA    (01 May 2023 19:07)    Interval hx: Pt is Aox1, confused.  AMRITA Ocampo present at the bedside.   Ronna ID# [id number] facilitated conversation and exam      PAST MEDICAL & SURGICAL HISTORY:  HTN (hypertension)      BPH (benign prostatic hyperplasia)      Lumbar stenosis  s/p sx 10/2017      (Idiopathic) normal pressure hydrocephalus      H/O hydrocephalus      Paroxysmal atrial fibrillation      Depression      SS (spinal stenosis)  lumbar vetebral fusion L3- L4, L4-L5, L5-S1      S/P cataract surgery          SOCIAL HISTORY:    Admitted from:  home w family, pt was on home hospice with [facility] has HHA 4 hours daily   Pt is , has 3 children they makes decisions as a family.  Irina is the primary contact  [ none ] Substance abuse, [ none ] Tobacco hx, [ none ] Alcohol hx, [ none ] Home Opioid Hx    Restorationist: n/a  Language:  Scottish    Irina Massey  (surrogate)   Phone# [phone]    FAMILY HISTORY:  FH: HTN (hypertension)  parents     unable to obtain from patient due to poor mentation, family unable to give information, see H&P for history  Baseline ADLs (prior to admission): bedbound, total care    Allergies    No Known Allergies    Intolerances      Present Symptoms: Mild, Moderate, Severe  Unable to obtain due to poor mentation]   CPOT 0    CPOT:    https://www.[facility].org/getattachment/[email or web address]/Critical-Care-Pain-Observation-Tool-(CPOT)  PAIN AD Score:   [email or web address].pdf (press ctrl +  left click to view)      MEDICATIONS  (STANDING):  amLODIPine   Tablet 5 milliGRAM(s) Oral daily  apixaban 5 milliGRAM(s) Oral every 12 hours  atorvastatin 20 milliGRAM(s) Oral at bedtime  cholecalciferol 5000 Unit(s) Oral daily  cyanocobalamin 1000 MICROGram(s) Oral daily  enalapril 20 milliGRAM(s) Oral every 12 hours  finasteride 5 milliGRAM(s) Oral daily  metoprolol succinate ER 50 milliGRAM(s) Oral daily  sertraline 100 milliGRAM(s) Oral daily    MEDICATIONS  (PRN):  acetaminophen     Tablet .. 650 milliGRAM(s) Oral every 6 hours PRN Temp greater or equal to 38C (100.4F), Mild Pain (1 - 3)  aluminum hydroxide/magnesium hydroxide/simethicone Suspension 30 milliLiter(s) Oral every 4 hours PRN Dyspepsia  HYDROmorphone  Injectable 2 milliGRAM(s) IV Push every 4 hours PRN Severe Pain (7 - 10)  ketorolac   Injectable 15 milliGRAM(s) IV Push every 6 hours PRN Moderate Pain (4 - 6)  LORazepam   Injectable 0.5 milliGRAM(s) IV Push every 4 hours PRN Anxiety  melatonin 3 milliGRAM(s) Oral at bedtime PRN Insomnia  ondansetron Injectable 4 milliGRAM(s) IV Push every 8 hours PRN Nausea and/or Vomiting      PHYSICAL EXAM:  Vital Signs Last 24 Hrs  T(C): 37.2 (03 May 2023 05:01), Max: 37.2 (03 May 2023 05:01)  T(F): 99 (03 May 2023 05:01), Max: 99 (03 May 2023 05:01)  HR: 86 (03 May 2023 05:01) (81 - 90)  BP: 120/77 (03 May 2023 05:01) (120/77 - 139/87)  BP(mean): --  RR: 18 (03 May 2023 05:01) (17 - 18)  SpO2: 98% (03 May 2023 05:01) (97% - 98%)    Parameters below as of 03 May 2023 05:01  Patient On (Oxygen Delivery Method): nasal cannula  O2 Flow (L/min): 2      Palliative Performance Scale/Karnofsky Score: 30%  http://npcrc.org/files/news/palliative_performance_scale_[email or web address].pdf    General: Chronically ill appearing, AOx1, confused.  NAD    HEENT: no abnormal lesion, dry mouth, neck supple  Lungs: decreased bases, unlabored on NC  CV: RRR, S1S2  GI: soft non distended non tender  incontinent  : incontinent    Musculoskeletal: weakness x4, bedbound, no edema  Skin: no abnormal skin lesions, poor skin turgor  Neuro: unable to follow commands  Oral intake ability: moderate po intake    LABS:                        8.2    13.11 )-----------( 335      ( 03 May 2023 06:52 )             27.4     05-03    137  |  104  |  27<H>  ----------------------------<  108<H>  4.0   |  29  |  0.79    Ca    9.2      03 May 2023 06:52  Phos  3.0     05-02  Mg     2.2     05-02    TPro  7.3  /  Alb  1.9<L>  /  TBili  0.5  /  DBili  x   /  AST  87<H>  /  ALT  89<H>  /  AlkPhos  213<H>  05-    Urinalysis Basic - ( 01 May 2023 16:10 )    Color: Yellow / Appearance: Clear / S.025 / pH: x  Gluc: x / Ketone: Negative  / Bili: Negative / Urobili: 1 mg/dL   Blood: x / Protein: 30 mg/dL / Nitrite: Negative   Leuk Esterase: Trace / RBC: 2-5 /HPF / WBC 3-5 /HPF   Sq Epi: x / Non Sq Epi: x / Bacteria: Few /HPF    < from: CT Head No Cont (23 @ 16:47) >  ACC: [id number] EXAM:  CT BRAIN   ORDERED BY: TRISTA DUPONT     PROCEDURE DATE:  2023          INTERPRETATION:  Exam Date: 2023 4:47 PM    CT head without IV contrast    CLINICAL INFORMATION: fall, eval for ICH    TECHNIQUE: Contiguous axial sections were obtained through the head.     Coronal and sagittal reformats were obtained.    COMPARISON: CT head 2023    FINDINGS:  Right frontal approach ventriculostomy catheter with tip at the septum   pellucidum is essentially unchanged. Ventricular size is unchanged.   Generalized cerebral volume loss is reidentified. Arachnoid cyst in the   left middle cranial fossa is reidentified.  There is no CT evidence for   acute cerebral cortical infarct. There is no evidence of hemorrhage.   There is periventricular and subcortical white matter hypoattenuation,    most compatible with chronic microvascular ischemic changes.   No mass   effect is found in the brain.  There is no midline shift or herniation   pattern.      The visualized portions of the paranasal sinuses and mastoid air cells   are clear.    IMPRESSION:    No evidence of acute intracranial abnormality.  No evidence of hemorrhage.    Chronic changes as above.    < end of copied text >      RADIOLOGY & ADDITIONAL STUDIES: reviewed  ADVANCED DIRECTIVES:  MOLST: DNR/DNI/no feeding tube/comfort measures only/DNH/ no labs

## 2023-05-03 NOTE — CONSULT NOTE ADULT - PROBLEM SELECTOR RECOMMENDATION 3
Pt is bedbound. Total care.  High risk for skin failure  Supportive care  Freq positioning Clinical evidence indicates that the patient has Severe protein calorie malnutrition/ 3rd degree. Albumin 1.9.  Moderate  po intake prior to admission.     In context of  Chronic Illness (>1 month)    Energy/Food intake <50% of estimated energy requirement >5 days  Weight loss: Moderate - severe   Body Fat loss: Severe   Cachexia, temporal wasting,  muscle atrophy  Muscle mass loss: Severe    Fluid Accumulation: Severe pleural effusions   Strength: weakened severe bedbound    Recommend:   pleasure feeds as tolerated - aspiration precautions, careful hand-feeding, teaching to caregivers  nutritional supplements as tolerated, nutrition consult    No feeding tube.

## 2023-05-03 NOTE — PROGRESS NOTE ADULT - SUBJECTIVE AND OBJECTIVE BOX
77 year old male, from home hospice, with PMHx HTN, HLD, BPH, PAF (on eliquis), colon cancer w/ mets (s/p chemo and surgery), and NPH (w/  shunt), dementia presented with left sided body pain, generalized weakness, and unable to ambulate for 3 days. Pt AAOx1, collateral obtained from daughter and HCP Irina at bedside. Per daughter, pt was enrolled in [facility] in 2022 due to decline in mental and functional status after surgery for colon cancer, when decision was made to focus on noninvasive intervention. Patient has had increased pain at home, increased urinary and bowel incontinence and has not been able to ambulate due to pain. Patient reports headache, dizziness, chest pain, and right lower extremity pain. Patient denies all other complaints including fever/chills, cough, n/v/d/c, dysuria or leg swelling. NKDA    (01 May 2023 19:07)      OVERNIGHT EVENTS:    No new overnight events.  Seen and examined at bedside.     REVIEW OF SYSTEMS:    Limited due to patients mental status   CONSTITUTIONAL: No fever    Vital Signs Last 24 Hrs  T(C): 37.2 (03 May 2023 05:01), Max: 37.2 (03 May 2023 05:01)  T(F): 99 (03 May 2023 05:01), Max: 99 (03 May 2023 05:01)  HR: 86 (03 May 2023 05:01) (81 - 90)  BP: 120/77 (03 May 2023 05:01) (120/77 - 139/87)  BP(mean): --  RR: 18 (03 May 2023 05:01) (17 - 18)  SpO2: 98% (03 May 2023 05:01) (97% - 98%)    Parameters below as of 03 May 2023 05:01  Patient On (Oxygen Delivery Method): nasal cannula  O2 Flow (L/min): 2      ________________________________________________  PHYSICAL EXAM:  HEENT: Normocephalic; conjunctivae and sclerae clear;  NECK : supple, no JVD  CHEST/LUNG: dec breath sounds at bases  HEART: S1 S2  regular  ABDOMEN: Soft, Nontender, Nondistended; Bowel sounds present  EXTREMITIES: no cyanosis; no LE edema; no calf tenderness  NERVOUS SYSTEM:  waxing waning mental status  SKIN: warm and dry; No rashes or lesions    _________________________________________________  CURRENT MEDICATIONS:    MEDICATIONS  (STANDING):  amLODIPine   Tablet 5 milliGRAM(s) Oral daily  apixaban 5 milliGRAM(s) Oral every 12 hours  atorvastatin 20 milliGRAM(s) Oral at bedtime  cholecalciferol 5000 Unit(s) Oral daily  cyanocobalamin 1000 MICROGram(s) Oral daily  enalapril 20 milliGRAM(s) Oral every 12 hours  finasteride 5 milliGRAM(s) Oral daily  lidocaine   4% Patch 1 Patch Transdermal daily  metoprolol succinate ER 50 milliGRAM(s) Oral daily  naloxone Injectable 0.4 milliGRAM(s) IV Push once  polyethylene glycol 3350 17 Gram(s) Oral daily  senna 2 Tablet(s) Oral at bedtime  sertraline 100 milliGRAM(s) Oral daily    MEDICATIONS  (PRN):  acetaminophen     Tablet .. 650 milliGRAM(s) Oral every 6 hours PRN Temp greater or equal to 38C (100.4F), Mild Pain (1 - 3)  aluminum hydroxide/magnesium hydroxide/simethicone Suspension 30 milliLiter(s) Oral every 4 hours PRN Dyspepsia  bisacodyl 5 milliGRAM(s) Oral daily PRN Constipation  ketorolac   Injectable 15 milliGRAM(s) IV Push every 6 hours PRN Moderate Pain (4 - 6)  LORazepam     Tablet 0.5 milliGRAM(s) Oral every 6 hours PRN Anxiety  melatonin 3 milliGRAM(s) Oral at bedtime PRN Insomnia  ondansetron Injectable 4 milliGRAM(s) IV Push every 8 hours PRN Nausea and/or Vomiting  oxyCODONE    IR 2.5 milliGRAM(s) Oral every 4 hours PRN Moderate Pain (4 - 6)  oxyCODONE    IR 5 milliGRAM(s) Oral every 4 hours PRN Severe Pain (7 - 10)      __________________________________________________  LABS:                          8.2    13.11 )-----------( 335      ( 03 May 2023 06:52 )             27.4     05-    137  |  104  |  27<H>  ----------------------------<  108<H>  4.0   |  29  |  0.79    Ca    9.2      03 May 2023 06:52  Phos  3.0     05-  Mg     2.2     -    TPro  7.3  /  Alb  1.9<L>  /  TBili  0.5  /  DBili  x   /  AST  87<H>  /  ALT  89<H>  /  AlkPhos  213<H>        Urinalysis Basic - ( 01 May 2023 16:10 )    Color: Yellow / Appearance: Clear / S.025 / pH: x  Gluc: x / Ketone: Negative  / Bili: Negative / Urobili: 1 mg/dL   Blood: x / Protein: 30 mg/dL / Nitrite: Negative   Leuk Esterase: Trace / RBC: 2-5 /HPF / WBC 3-5 /HPF   Sq Epi: x / Non Sq Epi: x / Bacteria: Few /HPF      CAPILLARY BLOOD GLUCOSE          __________________________________________________  RADIOLOGY & ADDITIONAL TESTS:    Imaging Personally Reviewed:  YES    < from: Xray Chest 1 View AP/PA (23 @ 16:48) >  IMPRESSION:  1. There is a new right pleural effusion with no evidence of pneumonia or   CHF.  2. There is a new right sided infusion catheter, terminating in the right   atrium, without pneumothorax.  3. Right-sided  shunt, unchanged.    < end of copied text >    Consultant(s) Notes Reviewed:   YES     Plan of care was discussed with patient and /or primary care giver; all questions and concerns were addressed and care was aligned with patient's wishes.    Plan discussed with attending and consulting physicians.

## 2023-05-03 NOTE — CONSULT NOTE ADULT - PROBLEM SELECTOR RECOMMENDATION 5
77 year old male, from home hospice, with PMHx HTN, HLD, BPH, PAF (on eliquis), colon cancer w/ mets (s/p chemo and surgery), and NPH (w/  shunt), dementia presented with left sided body pain, generalized weakness, and unable to ambulate for 3 days. Pt was at home with [facility] care.  Family can no longer provide appropriate care for him. They are requesting LTC with hospice.  Please see discussion noted above in GOC conversation

## 2023-05-03 NOTE — CONSULT NOTE ADULT - PROBLEM SELECTOR RECOMMENDATION 9
Pt is AOX1, bedbound.  Total care. No behavior issues.  FAST 7c.  Hospice appropriate. Pt is AOX1, bedbound.  Total care. No behavior issues.  FAST 7c.  Hospice appropriate.  Pt was on home hospice with Ann.    Family would like the pt to be discharged to LTC with hospice, preferably [facility].    [facility] referral made.

## 2023-05-03 NOTE — PROGRESS NOTE ADULT - SUBJECTIVE AND OBJECTIVE BOX
HPI:  77 year old male, from home hospice, with PMHx HTN, HLD, BPH, PAF (on eliquis), colon cancer w/ mets (s/p chemo and surgery), and NPH (w/  shunt), dementia presented with left sided body pain, generalized weakness, and unable to ambulate for 3 days. Pt AAOx1, collateral obtained from daughter and HCP Irina at bedside. Per daughter, pt was enrolled in [facility] in 2022 due to decline in mental and functional status after surgery for colon cancer, when decision was made to focus on noninvasive intervention. Patient has had increased pain at home, increased urinary and bowel incontinence and has not been able to ambulate due to pain. Patient reports headache, dizziness, chest pain, and right lower extremity pain. Patient denies all other complaints including fever/chills, cough, n/v/d/c, dysuria or leg swelling. NKDA    (01 May 2023 19:07)      OVERNIGHT EVENTS:    No new overnight events.  Seen and examined at bedside.     REVIEW OF SYSTEMS:    Limited due to patients mental status   CONSTITUTIONAL: No fever    Vital Signs Last 24 Hrs  T(C): 37.2 (03 May 2023 05:01), Max: 37.2 (03 May 2023 05:01)  T(F): 99 (03 May 2023 05:01), Max: 99 (03 May 2023 05:01)  HR: 86 (03 May 2023 05:01) (81 - 90)  BP: 120/77 (03 May 2023 05:01) (120/77 - 139/87)  BP(mean): --  RR: 18 (03 May 2023 05:01) (17 - 18)  SpO2: 98% (03 May 2023 05:01) (97% - 98%)    Parameters below as of 03 May 2023 05:01  Patient On (Oxygen Delivery Method): nasal cannula  O2 Flow (L/min): 2      ________________________________________________  PHYSICAL EXAM:    GENERAL: NAD  HEENT: Normocephalic; conjunctivae and sclerae clear;  NECK : supple, no JVD  CHEST/LUNG: Clear to auscultation; Nonlabored  HEART: S1 S2  regular  ABDOMEN: Soft, Nontender, Nondistended; Bowel sounds present  EXTREMITIES: no cyanosis; no LE edema; no calf tenderness  NERVOUS SYSTEM:  Alert; no new deficits  SKIN: warm and dry; No rashes or lesions    _________________________________________________  CURRENT MEDICATIONS:    MEDICATIONS  (STANDING):  amLODIPine   Tablet 5 milliGRAM(s) Oral daily  apixaban 5 milliGRAM(s) Oral every 12 hours  atorvastatin 20 milliGRAM(s) Oral at bedtime  cholecalciferol 5000 Unit(s) Oral daily  cyanocobalamin 1000 MICROGram(s) Oral daily  enalapril 20 milliGRAM(s) Oral every 12 hours  finasteride 5 milliGRAM(s) Oral daily  lidocaine   4% Patch 1 Patch Transdermal daily  metoprolol succinate ER 50 milliGRAM(s) Oral daily  naloxone Injectable 0.4 milliGRAM(s) IV Push once  polyethylene glycol 3350 17 Gram(s) Oral daily  senna 2 Tablet(s) Oral at bedtime  sertraline 100 milliGRAM(s) Oral daily    MEDICATIONS  (PRN):  acetaminophen     Tablet .. 650 milliGRAM(s) Oral every 6 hours PRN Temp greater or equal to 38C (100.4F), Mild Pain (1 - 3)  aluminum hydroxide/magnesium hydroxide/simethicone Suspension 30 milliLiter(s) Oral every 4 hours PRN Dyspepsia  bisacodyl 5 milliGRAM(s) Oral daily PRN Constipation  ketorolac   Injectable 15 milliGRAM(s) IV Push every 6 hours PRN Moderate Pain (4 - 6)  LORazepam     Tablet 0.5 milliGRAM(s) Oral every 6 hours PRN Anxiety  melatonin 3 milliGRAM(s) Oral at bedtime PRN Insomnia  ondansetron Injectable 4 milliGRAM(s) IV Push every 8 hours PRN Nausea and/or Vomiting  oxyCODONE    IR 2.5 milliGRAM(s) Oral every 4 hours PRN Moderate Pain (4 - 6)  oxyCODONE    IR 5 milliGRAM(s) Oral every 4 hours PRN Severe Pain (7 - 10)      __________________________________________________  LABS:                          8.2    13.11 )-----------( 335      ( 03 May 2023 06:52 )             27.4     05-    137  |  104  |  27<H>  ----------------------------<  108<H>  4.0   |  29  |  0.79    Ca    9.2      03 May 2023 06:52  Phos  3.0     05-  Mg     2.2     -    TPro  7.3  /  Alb  1.9<L>  /  TBili  0.5  /  DBili  x   /  AST  87<H>  /  ALT  89<H>  /  AlkPhos  213<H>        Urinalysis Basic - ( 01 May 2023 16:10 )    Color: Yellow / Appearance: Clear / S.025 / pH: x  Gluc: x / Ketone: Negative  / Bili: Negative / Urobili: 1 mg/dL   Blood: x / Protein: 30 mg/dL / Nitrite: Negative   Leuk Esterase: Trace / RBC: 2-5 /HPF / WBC 3-5 /HPF   Sq Epi: x / Non Sq Epi: x / Bacteria: Few /HPF      CAPILLARY BLOOD GLUCOSE          __________________________________________________  RADIOLOGY & ADDITIONAL TESTS:    Imaging Personally Reviewed:  YES    < from: Xray Chest 1 View AP/PA (23 @ 16:48) >  IMPRESSION:  1. There is a new right pleural effusion with no evidence of pneumonia or   CHF.  2. There is a new right sided infusion catheter, terminating in the right   atrium, without pneumothorax.  3. Right-sided  shunt, unchanged.    < end of copied text >    Consultant(s) Notes Reviewed:   YES     Plan of care was discussed with patient and /or primary care giver; all questions and concerns were addressed and care was aligned with patient's wishes.    Plan discussed with attending and consulting physicians.

## 2023-05-03 NOTE — CONSULT NOTE ADULT - PROBLEM SELECTOR RECOMMENDATION 4
77 year old male, from home hospice, with PMHx HTN, HLD, BPH, PAF (on eliquis), colon cancer w/ mets (s/p chemo and surgery), and NPH (w/  shunt), dementia presented with left sided body pain, generalized weakness, and unable to ambulate for 3 days. Pt was at home with [facility].  Family can no longer provide appropriate care for him. They are requesting LTC with hospice.  Reached out to the pt's daughter Irina Massey ([phone]) to discus goals of care. She responded w a text she will call later.  Palliative care will follow. Pt is bedbound. Total care.  High risk for skin failure  Supportive care  Freq positioning    Comfort only

## 2023-05-04 NOTE — PROGRESS NOTE ADULT - SUBJECTIVE AND OBJECTIVE BOX
HPI:  77 year old male, from home hospice, with PMHx HTN, HLD, BPH, PAF (on eliquis), colon cancer w/ mets (s/p chemo and surgery), and NPH (w/  shunt), dementia presented with left sided body pain, generalized weakness, and unable to ambulate for 3 days. Pt AAOx1, collateral obtained from daughter and HCP Irina at bedside. Per daughter, pt was enrolled in [facility] in October 2022 due to decline in mental and functional status after surgery for colon cancer, when decision was made to focus on noninvasive intervention. Patient has had increased pain at home, increased urinary and bowel incontinence and has not been able to ambulate due to pain. Patient reports headache, dizziness, chest pain, and right lower extremity pain. Patient denies all other complaints including fever/chills, cough, n/v/d/c, dysuria or leg swelling. NKDA    (01 May 2023 19:07)      OVERNIGHT EVENTS:    No new overnight events.  Seen and examined at bedside.     REVIEW OF SYSTEMS:    Limited due to patients mental status       Vital Signs Last 24 Hrs  T(C): 36.7 (04 May 2023 14:03), Max: 36.7 (04 May 2023 04:49)  T(F): 98.1 (04 May 2023 14:03), Max: 98.1 (04 May 2023 04:49)  HR: 63 (04 May 2023 14:03) (63 - 95)  BP: 110/76 (04 May 2023 14:03) (110/76 - 128/89)  BP(mean): --  RR: 18 (04 May 2023 14:03) (18 - 18)  SpO2: 94% (04 May 2023 14:03) (94% - 96%)    Parameters below as of 04 May 2023 14:03  Patient On (Oxygen Delivery Method): nasal cannula  O2 Flow (L/min): 2      ________________________________________________  PHYSICAL EXAM:    GENERAL: NAD  HEENT: Normocephalic; conjunctivae and sclerae clear;  NECK : supple, no JVD  CHEST/LUNG: dec breath sounds at bases   HEART: S1 S2  regular  ABDOMEN: Soft, Nontender, Nondistended; Bowel sounds present  EXTREMITIES: no cyanosis; no LE edema; no calf tenderness  NERVOUS SYSTEM:  Alert; awake  SKIN: warm and dry; No rashes or lesions    _________________________________________________  CURRENT MEDICATIONS:    MEDICATIONS  (STANDING):  amLODIPine   Tablet 5 milliGRAM(s) Oral daily  apixaban 5 milliGRAM(s) Oral every 12 hours  atorvastatin 20 milliGRAM(s) Oral at bedtime  cholecalciferol 5000 Unit(s) Oral daily  cyanocobalamin 1000 MICROGram(s) Oral daily  enalapril 20 milliGRAM(s) Oral every 12 hours  finasteride 5 milliGRAM(s) Oral daily  lidocaine   4% Patch 1 Patch Transdermal daily  metoprolol succinate ER 50 milliGRAM(s) Oral daily  naloxone Injectable 0.4 milliGRAM(s) IV Push once  polyethylene glycol 3350 17 Gram(s) Oral daily  senna 2 Tablet(s) Oral at bedtime  sertraline 100 milliGRAM(s) Oral daily    MEDICATIONS  (PRN):  acetaminophen     Tablet .. 650 milliGRAM(s) Oral every 6 hours PRN Temp greater or equal to 38C (100.4F), Mild Pain (1 - 3)  aluminum hydroxide/magnesium hydroxide/simethicone Suspension 30 milliLiter(s) Oral every 4 hours PRN Dyspepsia  bisacodyl 5 milliGRAM(s) Oral daily PRN Constipation  ketorolac   Injectable 15 milliGRAM(s) IV Push every 6 hours PRN Moderate Pain (4 - 6)  LORazepam     Tablet 0.5 milliGRAM(s) Oral every 6 hours PRN Anxiety  melatonin 3 milliGRAM(s) Oral at bedtime PRN Insomnia  ondansetron Injectable 4 milliGRAM(s) IV Push every 8 hours PRN Nausea and/or Vomiting  oxyCODONE    IR 2.5 milliGRAM(s) Oral every 4 hours PRN Moderate Pain (4 - 6)  oxyCODONE    IR 5 milliGRAM(s) Oral every 4 hours PRN Severe Pain (7 - 10)      __________________________________________________  LABS:                          8.2    13.11 )-----------( 335      ( 03 May 2023 06:52 )             27.4     05-03    137  |  104  |  27<H>  ----------------------------<  108<H>  4.0   |  29  |  0.79    Ca    9.2      03 May 2023 06:52          CAPILLARY BLOOD GLUCOSE          __________________________________________________  RADIOLOGY & ADDITIONAL TESTS:    Imaging Personally Reviewed:  YES    < from: Xray Chest 1 View AP/PA (05.01.23 @ 16:48) >  IMPRESSION:  1. There is a new right pleural effusion with no evidence of pneumonia or   CHF.  2. There is a new right sided infusion catheter, terminating in the right   atrium, without pneumothorax.  3. Right-sided  shunt, unchanged.    < end of copied text >    Consultant(s) Notes Reviewed:   YES     Plan of care was discussed with patient and /or primary care giver; all questions and concerns were addressed and care was aligned with patient's wishes.    Plan discussed with attending and consulting physicians.

## 2023-05-04 NOTE — PROGRESS NOTE ADULT - SUBJECTIVE AND OBJECTIVE BOX
HPI:  77 year old male, from home hospice, with PMHx HTN, HLD, BPH, PAF (on eliquis), colon cancer w/ mets (s/p chemo and surgery), and NPH (w/  shunt), dementia presented with left sided body pain, generalized weakness, and unable to ambulate for 3 days. Pt AAOx1, collateral obtained from daughter and HCP Irina at bedside. Per daughter, pt was enrolled in [facility] in October 2022 due to decline in mental and functional status after surgery for colon cancer, when decision was made to focus on noninvasive intervention. Patient has had increased pain at home, increased urinary and bowel incontinence and has not been able to ambulate due to pain. Patient reports headache, dizziness, chest pain, and right lower extremity pain. Patient denies all other complaints including fever/chills, cough, n/v/d/c, dysuria or leg swelling. NKDA    (01 May 2023 19:07)      OVERNIGHT EVENTS:    No new overnight events.  Seen and examined at bedside.     REVIEW OF SYSTEMS:    Limited due to patients mental status   CONSTITUTIONAL: No fever      Vital Signs Last 24 Hrs  T(C): 36.7 (04 May 2023 14:03), Max: 36.7 (04 May 2023 04:49)  T(F): 98.1 (04 May 2023 14:03), Max: 98.1 (04 May 2023 04:49)  HR: 63 (04 May 2023 14:03) (63 - 95)  BP: 110/76 (04 May 2023 14:03) (110/76 - 128/89)  BP(mean): --  RR: 18 (04 May 2023 14:03) (18 - 18)  SpO2: 94% (04 May 2023 14:03) (94% - 96%)    Parameters below as of 04 May 2023 14:03  Patient On (Oxygen Delivery Method): nasal cannula  O2 Flow (L/min): 2      ________________________________________________  PHYSICAL EXAM:    GENERAL: NAD  HEENT: Normocephalic; conjunctivae and sclerae clear;  NECK : supple, no JVD  CHEST/LUNG: Clear to auscultation; Nonlabored  HEART: S1 S2  regular  ABDOMEN: Soft, Nontender, Nondistended; Bowel sounds present  EXTREMITIES: no cyanosis; no LE edema; no calf tenderness  NERVOUS SYSTEM:  Alert; no new deficits  SKIN: warm and dry; No rashes or lesions    _________________________________________________  CURRENT MEDICATIONS:    MEDICATIONS  (STANDING):  amLODIPine   Tablet 5 milliGRAM(s) Oral daily  apixaban 5 milliGRAM(s) Oral every 12 hours  atorvastatin 20 milliGRAM(s) Oral at bedtime  cholecalciferol 5000 Unit(s) Oral daily  cyanocobalamin 1000 MICROGram(s) Oral daily  enalapril 20 milliGRAM(s) Oral every 12 hours  finasteride 5 milliGRAM(s) Oral daily  lidocaine   4% Patch 1 Patch Transdermal daily  metoprolol succinate ER 50 milliGRAM(s) Oral daily  naloxone Injectable 0.4 milliGRAM(s) IV Push once  polyethylene glycol 3350 17 Gram(s) Oral daily  senna 2 Tablet(s) Oral at bedtime  sertraline 100 milliGRAM(s) Oral daily    MEDICATIONS  (PRN):  acetaminophen     Tablet .. 650 milliGRAM(s) Oral every 6 hours PRN Temp greater or equal to 38C (100.4F), Mild Pain (1 - 3)  aluminum hydroxide/magnesium hydroxide/simethicone Suspension 30 milliLiter(s) Oral every 4 hours PRN Dyspepsia  bisacodyl 5 milliGRAM(s) Oral daily PRN Constipation  ketorolac   Injectable 15 milliGRAM(s) IV Push every 6 hours PRN Moderate Pain (4 - 6)  LORazepam     Tablet 0.5 milliGRAM(s) Oral every 6 hours PRN Anxiety  melatonin 3 milliGRAM(s) Oral at bedtime PRN Insomnia  ondansetron Injectable 4 milliGRAM(s) IV Push every 8 hours PRN Nausea and/or Vomiting  oxyCODONE    IR 2.5 milliGRAM(s) Oral every 4 hours PRN Moderate Pain (4 - 6)  oxyCODONE    IR 5 milliGRAM(s) Oral every 4 hours PRN Severe Pain (7 - 10)      __________________________________________________  LABS:                          8.2    13.11 )-----------( 335      ( 03 May 2023 06:52 )             27.4     05-03    137  |  104  |  27<H>  ----------------------------<  108<H>  4.0   |  29  |  0.79    Ca    9.2      03 May 2023 06:52          CAPILLARY BLOOD GLUCOSE          __________________________________________________  RADIOLOGY & ADDITIONAL TESTS:    Imaging Personally Reviewed:  YES    < from: Xray Chest 1 View AP/PA (05.01.23 @ 16:48) >  IMPRESSION:  1. There is a new right pleural effusion with no evidence of pneumonia or   CHF.  2. There is a new right sided infusion catheter, terminating in the right   atrium, without pneumothorax.  3. Right-sided  shunt, unchanged.    < end of copied text >    Consultant(s) Notes Reviewed:   YES     Plan of care was discussed with patient and /or primary care giver; all questions and concerns were addressed and care was aligned with patient's wishes.    Plan discussed with attending and consulting physicians.

## 2023-05-05 NOTE — PROGRESS NOTE ADULT - SUBJECTIVE AND OBJECTIVE BOX
HPI:  77 year old male, from home hospice, with PMHx HTN, HLD, BPH, PAF (on eliquis), colon cancer w/ mets (s/p chemo and surgery), and NPH (w/  shunt), dementia presented with left sided body pain, generalized weakness, and unable to ambulate for 3 days. Pt AAOx1, collateral obtained from daughter and HCP Irina at bedside. Per daughter, pt was enrolled in [facility] in October 2022 due to decline in mental and functional status after surgery for colon cancer, when decision was made to focus on noninvasive intervention. Patient has had increased pain at home, increased urinary and bowel incontinence and has not been able to ambulate due to pain. Patient reports headache, dizziness, chest pain, and right lower extremity pain. Patient denies all other complaints including fever/chills, cough, n/v/d/c, dysuria or leg swelling. NKDA    (01 May 2023 19:07)      OVERNIGHT EVENTS:    No new overnight events.  Seen and examined at bedside.     REVIEW OF SYSTEMS:    Limited due to patients mental status   CONSTITUTIONAL: No fever    Vital Signs Last 24 Hrs  T(C): 36.4 (05 May 2023 13:03), Max: 38.2 (04 May 2023 21:53)  T(F): 97.6 (05 May 2023 13:03), Max: 100.8 (04 May 2023 21:53)  HR: 93 (05 May 2023 13:03) (91 - 95)  BP: 134/94 (05 May 2023 13:03) (129/76 - 149/95)  BP(mean): --  RR: 18 (05 May 2023 13:03) (18 - 18)  SpO2: 95% (05 May 2023 13:03) (94% - 96%)    Parameters below as of 05 May 2023 13:03  Patient On (Oxygen Delivery Method): nasal cannula  O2 Flow (L/min): 2      ________________________________________________  PHYSICAL EXAM:    GENERAL: NAD  HEENT: Normocephalic; conjunctivae and sclerae clear;  NECK : supple, no JVD  CHEST/LUNG: Clear to auscultation; Nonlabored  HEART: S1 S2  regular  ABDOMEN: Soft, Nontender, Nondistended; Bowel sounds present  EXTREMITIES: no cyanosis; no LE edema; no calf tenderness  NERVOUS SYSTEM:  Alert; no new deficits  SKIN: warm and dry; No rashes or lesions    _________________________________________________  CURRENT MEDICATIONS:    MEDICATIONS  (STANDING):  amLODIPine   Tablet 5 milliGRAM(s) Oral daily  apixaban 5 milliGRAM(s) Oral every 12 hours  atorvastatin 20 milliGRAM(s) Oral at bedtime  cholecalciferol 5000 Unit(s) Oral daily  cyanocobalamin 1000 MICROGram(s) Oral daily  enalapril 20 milliGRAM(s) Oral every 12 hours  finasteride 5 milliGRAM(s) Oral daily  lidocaine   4% Patch 1 Patch Transdermal daily  metoprolol succinate ER 50 milliGRAM(s) Oral daily  naloxone Injectable 0.4 milliGRAM(s) IV Push once  polyethylene glycol 3350 17 Gram(s) Oral daily  senna 2 Tablet(s) Oral at bedtime  sertraline 100 milliGRAM(s) Oral daily    MEDICATIONS  (PRN):  acetaminophen     Tablet .. 650 milliGRAM(s) Oral every 6 hours PRN Temp greater or equal to 38C (100.4F), Mild Pain (1 - 3)  aluminum hydroxide/magnesium hydroxide/simethicone Suspension 30 milliLiter(s) Oral every 4 hours PRN Dyspepsia  bisacodyl 5 milliGRAM(s) Oral daily PRN Constipation  ketorolac   Injectable 15 milliGRAM(s) IV Push every 6 hours PRN Moderate Pain (4 - 6)  LORazepam     Tablet 0.5 milliGRAM(s) Oral every 6 hours PRN Anxiety  melatonin 3 milliGRAM(s) Oral at bedtime PRN Insomnia  ondansetron Injectable 4 milliGRAM(s) IV Push every 8 hours PRN Nausea and/or Vomiting  oxyCODONE    IR 2.5 milliGRAM(s) Oral every 4 hours PRN Moderate Pain (4 - 6)  oxyCODONE    IR 5 milliGRAM(s) Oral every 4 hours PRN Severe Pain (7 - 10)      __________________________________________________  LABS:                CAPILLARY BLOOD GLUCOSE          __________________________________________________  RADIOLOGY & ADDITIONAL TESTS:    Imaging Personally Reviewed:  YES    < from: Xray Chest 1 View AP/PA (05.01.23 @ 16:48) >  IMPRESSION:  1. There is a new right pleural effusion with no evidence of pneumonia or   CHF.  2. There is a new right sided infusion catheter, terminating in the right   atrium, without pneumothorax.  3. Right-sided  shunt, unchanged.    < end of copied text >    Consultant(s) Notes Reviewed:   YES     Plan of care was discussed with patient and /or primary care giver; all questions and concerns were addressed and care was aligned with patient's wishes.    Plan discussed with attending and consulting physicians.

## 2023-05-05 NOTE — PROGRESS NOTE ADULT - PROBLEM SELECTOR PLAN 2
Moderate-Severe    Recommendations  c/w Acetaminophen Tablet .. 650 mg Oral Q 6 hrs PRN for Mild Pain (1 - 3)  c/w OxyCODONE IR 2.5 mg Oral Q 4 hrs PRN for Moderate Pain (4 - 6)  c/w OxyCODONE IR 5 mg Oral Q 4 hrs PRN for Severe Pain (7 - 10)  Lidocaine 4% Patch 1 Patch Transdermal daily  Reassess pain Q 4 hrs Moderate-Severe    Recommendations  Requested RN to reassess pain Q 4 hrs  c/w Acetaminophen Tablet .. 650 mg Oral Q 6 hrs PRN for Mild Pain (1 - 3)  c/w OxyCODONE IR 2.5 mg Oral Q 4 hrs PRN for Moderate Pain (4 - 6)  c/w OxyCODONE IR 5 mg Oral Q 4 hrs PRN for Severe Pain (7 - 10)  Lidocaine 4% Patch 1 Patch Transdermal daily

## 2023-05-05 NOTE — PROGRESS NOTE ADULT - PROBLEM SELECTOR PLAN 3
Pt is AOX1, bedbound.  Total care. No behavior issues.      FAST Score: 7C  Hospice appropriate=> LTC with hospice     SW referral made

## 2023-05-05 NOTE — PROGRESS NOTE ADULT - SUBJECTIVE AND OBJECTIVE BOX
HPI:  77 year old male, from home hospice, with PMHx HTN, HLD, BPH, PAF (on eliquis), colon cancer w/ mets (s/p chemo and surgery), and NPH (w/  shunt), dementia presented with left sided body pain, generalized weakness, and unable to ambulate for 3 days. Pt AAOx1, collateral obtained from daughter and HCP Irina at bedside. Per daughter, pt was enrolled in [facility] in October 2022 due to decline in mental and functional status after surgery for colon cancer, when decision was made to focus on noninvasive intervention. Patient has had increased pain at home, increased urinary and bowel incontinence and has not been able to ambulate due to pain. Patient reports headache, dizziness, chest pain, and right lower extremity pain. Patient denies all other complaints including fever/chills, cough, n/v/d/c, dysuria or leg swelling. NKDA    (01 May 2023 19:07)      OVERNIGHT EVENTS:    No new overnight events.  Seen and examined at bedside.     REVIEW OF SYSTEMS:    Limited due to patients mental status   CONSTITUTIONAL: No fever    MEDICATIONS  (STANDING):  amLODIPine   Tablet 5 milliGRAM(s) Oral daily  apixaban 5 milliGRAM(s) Oral every 12 hours  atorvastatin 20 milliGRAM(s) Oral at bedtime  cholecalciferol 5000 Unit(s) Oral daily  cyanocobalamin 1000 MICROGram(s) Oral daily  enalapril 20 milliGRAM(s) Oral every 12 hours  finasteride 5 milliGRAM(s) Oral daily  lidocaine   4% Patch 1 Patch Transdermal daily  metoprolol succinate ER 50 milliGRAM(s) Oral daily  naloxone Injectable 0.4 milliGRAM(s) IV Push once  polyethylene glycol 3350 17 Gram(s) Oral daily  senna 2 Tablet(s) Oral at bedtime  sertraline 100 milliGRAM(s) Oral daily    MEDICATIONS  (PRN):  acetaminophen     Tablet .. 650 milliGRAM(s) Oral every 6 hours PRN Temp greater or equal to 38C (100.4F), Mild Pain (1 - 3)  aluminum hydroxide/magnesium hydroxide/simethicone Suspension 30 milliLiter(s) Oral every 4 hours PRN Dyspepsia  bisacodyl 5 milliGRAM(s) Oral daily PRN Constipation  ketorolac   Injectable 15 milliGRAM(s) IV Push every 6 hours PRN Moderate Pain (4 - 6)  LORazepam     Tablet 0.5 milliGRAM(s) Oral every 6 hours PRN Anxiety  melatonin 3 milliGRAM(s) Oral at bedtime PRN Insomnia  ondansetron Injectable 4 milliGRAM(s) IV Push every 8 hours PRN Nausea and/or Vomiting  oxyCODONE    IR 5 milliGRAM(s) Oral every 4 hours PRN Severe Pain (7 - 10)  oxyCODONE    IR 2.5 milliGRAM(s) Oral every 4 hours PRN Moderate Pain (4 - 6)    Vital Signs Last 24 Hrs  T(C): 37.1 (05-05-23 @ 21:02), Max: 37.1 (05-05-23 @ 21:02)  T(F): 98.8 (05-05-23 @ 21:02), Max: 98.8 (05-05-23 @ 21:02)  HR: 91 (05-05-23 @ 21:02) (91 - 93)  BP: 127/97 (05-05-23 @ 21:02) (127/97 - 137/90)  BP(mean): --  RR: 18 (05-05-23 @ 21:02) (18 - 18)  SpO2: 97% (05-05-23 @ 21:02) (95% - 97%)      ________________________________________________  PHYSICAL EXAM:    GENERAL: NAD  HEENT: Normocephalic; conjunctivae and sclerae clear;  NECK : supple, no JVD  CHEST/LUNG: dec breath sounds at bases  HEART: S1 S2  regular  ABDOMEN: Soft, Nontender, Nondistended; Bowel sounds present  EXTREMITIES: no cyanosis; no LE edema; no calf tenderness  NERVOUS SYSTEM:  Alert;   SKIN: warm and dry; No rashes or lesions    _________________________________________________    __________________________________________________  LABS:                CAPILLARY BLOOD GLUCOSE          __________________________________________________  RADIOLOGY & ADDITIONAL TESTS:    Imaging Personally Reviewed:  YES    < from: Xray Chest 1 View AP/PA (05.01.23 @ 16:48) >  IMPRESSION:  1. There is a new right pleural effusion with no evidence of pneumonia or   CHF.  2. There is a new right sided infusion catheter, terminating in the right   atrium, without pneumothorax.  3. Right-sided  shunt, unchanged.    < end of copied text >    Consultant(s) Notes Reviewed:   YES     Plan of care was discussed with patient and /or primary care giver; all questions and concerns were addressed and care was aligned with patient's wishes.    Plan discussed with attending and consulting physicians.

## 2023-05-05 NOTE — PROGRESS NOTE ADULT - CONVERSATION DETAILS
Called and spoke with patient's daughter, Irina Massey, and asked her if she had any questions regarding her father's medical conditions and last radiologic studies, as requested by her. DTR stated that she requested the tests because she would like to know more about her father's Colon Cancer. She understands that his health has been declining. Stated she had no intention to make him go for any aggressive treatment.    DTR is sorry that her family will not be able to handle his care and provide appropriate care at home. She asked questions about his respiratory status and the imaging results and his pain. She was updated and informed that her father needs ongoing symptoms management and will benefit from hospice services.     Called DTR again to make sure she understood well the most recent CT Chest w/ IV Cont (05.05.23). Stated that she saw the results in the [facility] but she would like more information. I explained to her that her father's colon cancer is now metastatic to multiple organs, including the lungs, liver, lymph nodes, and abdomen. The complications are related to his cancer (peritoneal/omental carcinomatosis and moderate ascites). Now she understands that her father has extensive disease and his cancer is much worse.     DTR stated that she would like to see him comfortable and go to the NH with Palliative Care/Hospice Care services. She just does not know if [facility] is the best/right place for him. I spoke about [facility] as another possibility/maybe? Stated it is too far. I encouraged her to speak with  and ask questions.    I informed her that if his symptoms cannot be managed at the SNF or approaches end-of-life and is uncomfortable, he would be eligible for continued inpatient hospice care admission due to ongoing active symptom management with IV medications. Verbalized understanding. Emotional support provided.    Reviewed current MOLST and endorsed: DNR/DNI/no feeding tube/comfort measures only/DNH/ no labs. Called and spoke with patient's daughter, Irina Massey, and asked her if she had any questions regarding her father's medical conditions and last radiologic studies, as requested by her. DTR stated that she requested the tests because she would like to know more about her father's Colon Cancer. She understands that his health has been declining. Stated she had no intention to make him go for any aggressive treatment.    DTR is sorry that her family will not be able to handle his care and provide appropriate care at home. She asked questions about his respiratory status and the imaging results and his pain. She was updated and informed that her father needs ongoing symptoms management and will benefit from hospice services.     Called DTR again to make sure she understood well the most recent CT Chest w/ IV Cont (05.05.23). Stated that she saw the results in the [facility] but she would like more information. I explained to her that her father's colon cancer is now metastatic to multiple organs, including the lungs, liver, lymph nodes, and abdomen. The complications are related to his cancer (peritoneal/omental carcinomatosis and moderate ascites). Now she understands that her father has extensive disease and his cancer is much worse.     DTR stated that she would like to see him comfortable and go to the NH with Palliative Care/Hospice Care services. She just does not know if [facility] is the best/right place for him. I spoke about [facility] as another possibility/maybe? Stated it is too far. I encouraged her to speak with  and ask questions.    I informed her that if his symptoms cannot be managed at the SNF or he approaches end-of-life and is uncomfortable, he would be eligible for continued inpatient hospice care admission due to ongoing active symptom management with IV medications. Verbalized understanding. Emotional support provided.    Reviewed current MOLST and endorsed: DNR/DNI/no feeding tube/comfort measures only/DNH/ no labs.

## 2023-05-05 NOTE — PROGRESS NOTE ADULT - SUBJECTIVE AND OBJECTIVE BOX
follow up on:  complex medical decision making related to goals of care    [facility] Geriatric and Palliative Consult Service:  Dr. Lesli Moreno: cell ([phone])  Dr. Erlin García: cell ([phone])  Laura Dangelo DNP: cell ([phone])  Andriy Mata NP: cell ([phone])   Mireya Lyon NP: YARELI Riley LMSW: cell ([phone])     OVERNIGHT EVENTS:    Present Symptoms:   Pain:   Fatigue:  Nausea:  Lack of Appetite:   SOB:  Depression:  Anxiety:  Review of Systems: [All others negative or Unable to obtain due to poor mentation]    MEDICATIONS  (STANDING):  amLODIPine   Tablet 5 milliGRAM(s) Oral daily  apixaban 5 milliGRAM(s) Oral every 12 hours  atorvastatin 20 milliGRAM(s) Oral at bedtime  cholecalciferol 5000 Unit(s) Oral daily  cyanocobalamin 1000 MICROGram(s) Oral daily  enalapril 20 milliGRAM(s) Oral every 12 hours  finasteride 5 milliGRAM(s) Oral daily  lidocaine   4% Patch 1 Patch Transdermal daily  metoprolol succinate ER 50 milliGRAM(s) Oral daily  naloxone Injectable 0.4 milliGRAM(s) IV Push once  polyethylene glycol 3350 17 Gram(s) Oral daily  senna 2 Tablet(s) Oral at bedtime  sertraline 100 milliGRAM(s) Oral daily    MEDICATIONS  (PRN):  acetaminophen     Tablet .. 650 milliGRAM(s) Oral every 6 hours PRN Temp greater or equal to 38C (100.4F), Mild Pain (1 - 3)  aluminum hydroxide/magnesium hydroxide/simethicone Suspension 30 milliLiter(s) Oral every 4 hours PRN Dyspepsia  bisacodyl 5 milliGRAM(s) Oral daily PRN Constipation  ketorolac   Injectable 15 milliGRAM(s) IV Push every 6 hours PRN Moderate Pain (4 - 6)  LORazepam     Tablet 0.5 milliGRAM(s) Oral every 6 hours PRN Anxiety  melatonin 3 milliGRAM(s) Oral at bedtime PRN Insomnia  ondansetron Injectable 4 milliGRAM(s) IV Push every 8 hours PRN Nausea and/or Vomiting  oxyCODONE    IR 2.5 milliGRAM(s) Oral every 4 hours PRN Moderate Pain (4 - 6)  oxyCODONE    IR 5 milliGRAM(s) Oral every 4 hours PRN Severe Pain (7 - 10)      PHYSICAL EXAM:  Vital Signs Last 24 Hrs  T(C): 36.4 (05 May 2023 13:03), Max: 38.2 (04 May 2023 21:53)  T(F): 97.6 (05 May 2023 13:03), Max: 100.8 (04 May 2023 21:53)  HR: 93 (05 May 2023 13:03) (91 - 95)  BP: 134/94 (05 May 2023 13:03) (129/76 - 149/95)  BP(mean): --  RR: 18 (05 May 2023 13:03) (18 - 18)  SpO2: 95% (05 May 2023 13:03) (94% - 96%)    Parameters below as of 05 May 2023 13:03  Patient On (Oxygen Delivery Method): nasal cannula  O2 Flow (L/min): 2      General: alert  oriented x ____    lethargic distressed cachexia  verbal nonverbal  unarousable     Palliative Performance Scale/Karnofsky Score:  ECOG Performance:    HEENT: no abnormal lesion, dry mouth  ET tube/trach oral lesions:  Lungs: tachypnea/labored breathing, audible excessive secretions  CV: RRR, S1S2, tachycardia  GI: soft non distended non tender  incontinent               PEG/NG/OG tube  constipation  last BM:   : incontinent  oliguria/anuria  osorio  Musculoskeletal: weakness x4 edema x4    ambulatory with assistance   mostly/fully bedbound/wheelchair bound  Skin: no abnormal skin lesions, poor skin turgor, pressure ulcers stage:   Neuro: no deficits, mild cognitive impairment dsyphagia/dysarthria paresis  Oral intake ability: unable/only mouth care, minimal moderate full capability    LABS:                RADIOLOGY & ADDITIONAL STUDIES: follow up on:  complex medical decision making related to goals of care    [facility] Geriatric and Palliative Consult Service:  Dr. Lesli Moreno: cell ([phone])  Dr. Erlin García: cell ([phone])  Laura Dangelo DNP: cell ([phone])  Andriy Mata NP: cell ([phone])   Mireya Lyon NP: TEAMS  Trev Riley LMSW: cell ([phone])       INTERIM HX:  Met and assessed patient at bedside and called his DTR. See GOC conversation.      OVERNIGHT EVENTS: No acute overnight events    Present Symptoms:   Pain: "Yes" and touched his left side around left flank area and left hip. Also stated "someone always has some pain". Nonverbal signs of moderate pain (some grimacing)  Fatigue:  Nausea: No  Lack of Appetite:   SOB:  Depression: unable to assess  Anxiety:  unable to assess    Review of Systems: On 5/1/2023, "Patient reports headache, dizziness, chest pain, and right lower extremity pain." Today, he denied these symptoms.     MEDICATIONS  (STANDING):  amLODIPine   Tablet 5 milliGRAM(s) Oral daily  apixaban 5 milliGRAM(s) Oral every 12 hours  atorvastatin 20 milliGRAM(s) Oral at bedtime  cholecalciferol 5000 Unit(s) Oral daily  cyanocobalamin 1000 MICROGram(s) Oral daily  enalapril 20 milliGRAM(s) Oral every 12 hours  finasteride 5 milliGRAM(s) Oral daily  lidocaine   4% Patch 1 Patch Transdermal daily  metoprolol succinate ER 50 milliGRAM(s) Oral daily  naloxone Injectable 0.4 milliGRAM(s) IV Push once  polyethylene glycol 3350 17 Gram(s) Oral daily  senna 2 Tablet(s) Oral at bedtime  sertraline 100 milliGRAM(s) Oral daily    MEDICATIONS  (PRN):  acetaminophen     Tablet .. 650 milliGRAM(s) Oral every 6 hours PRN Temp greater or equal to 38C (100.4F), Mild Pain (1 - 3)  aluminum hydroxide/magnesium hydroxide/simethicone Suspension 30 milliLiter(s) Oral every 4 hours PRN Dyspepsia  bisacodyl 5 milliGRAM(s) Oral daily PRN Constipation  ketorolac   Injectable 15 milliGRAM(s) IV Push every 6 hours PRN Moderate Pain (4 - 6)  LORazepam     Tablet 0.5 milliGRAM(s) Oral every 6 hours PRN Anxiety  melatonin 3 milliGRAM(s) Oral at bedtime PRN Insomnia  ondansetron Injectable 4 milliGRAM(s) IV Push every 8 hours PRN Nausea and/or Vomiting  oxyCODONE    IR 2.5 milliGRAM(s) Oral every 4 hours PRN Moderate Pain (4 - 6)  oxyCODONE    IR 5 milliGRAM(s) Oral every 4 hours PRN Severe Pain (7 - 10)      PHYSICAL EXAM:  Vital Signs Last 24 Hrs  T(C): 36.4 (05 May 2023 13:03), Max: 38.2 (04 May 2023 21:53)  T(F): 97.6 (05 May 2023 13:03), Max: 100.8 (04 May 2023 21:53)  HR: 93 (05 May 2023 13:03) (91 - 95)  BP: 134/94 (05 May 2023 13:03) (129/76 - 149/95)  BP(mean): --  RR: 18 (05 May 2023 13:03) (18 - 18)  SpO2: 95% (05 May 2023 13:03) (94% - 96%)    Parameters below as of 05 May 2023 13:03  Patient On (Oxygen Delivery Method): nasal cannula  O2 Flow (L/min): 2      General: appears chronically ill, in NAD but answered "yes" for pain. See above    Palliative Performance Scale/Karnofsky Score: 30%  http://npcrc.org/files/news/palliative_performance_scale_[email or web address].pdf    HEENT: EOMI, pale, dry mouth   Lungs: decreased bases, unlabored on NC  CV: RRR, S1S2 present, no LE edema  GI: soft, distended, tender, incontinent  : incontinent    Musculoskeletal: weakness x4, bedbound  Skin: no abnormal skin lesions, poor skin turgor  Neuro: A&O x1, pleasantly confused, minimally verbal  Oral intake ability: moderate oral intake with assistance      LABS:  No new labs    RADIOLOGY & ADDITIONAL STUDIES:    < from: CT Chest w/ IV Cont (05.05.23 @ 13:36) >  ACC: [id number] EXAM:  CT ABDOMEN AND PELVIS OC IC   ORDERED BY: RINKU LEMONS     ACC: [id number] EXAM:  CT CHEST IC   ORDERED BY: RINKU LEMONS     PROCEDURE DATE:  05/05/2023      INTERPRETATION:  CLINICAL INFORMATION: Colon cancer with metastatic disease    COMPARISON: MRI abdomen 10/8/2017.    CONTRAST/COMPLICATIONS:  IV Contrast: Omnipaque 350 (accession [id number]), IV contrast documented   in unlinked concurrent exam (accession [id number])  90 cc administered   10 cc discarded  Oral Contrast: NONE (accession [id number]), Gastroview (accession [id number])  Complications: None reported at time of study completion    PROCEDURE:  CT of the Chest, Abdomen and Pelvis was performed.  Sagittal and coronal reformats were performed.    FINDINGS:    CHEST:  LUNGS AND LARGE AIRWAYS: Respiratory motion limits evaluation of the airways. Trace airway secretions. Large right pleural effusion with adjacent lung atelectasis. Trace left pleural effusion with adjacent left   basilar atelectasis. Multiple metastatic pulmonary nodules, for reference measuring 15 mm of the right upper lobe, image 48 series 2 and 13 mm of the left lower lobe image 75 series 2.  PLEURA: Large right pleural effusion with adjacent compressive atelectasis. Trace left pleural effusion with adjacent atelectasis. No pneumothorax.  VESSELS: Port catheter terminates at the cavoatrial junction. Normal caliber of the thoracic aorta with atheromatous changes. Main pulmonary artery size is within normal limits.  HEART: Heart size is within normal limits. No pericardial effusion.  MEDIASTINUM AND LIBBY: Right cardiophrenic node measures 1 cm, image 113 series 2, enlarged and pathologic appearing. Additional small volume nodes of the thorax. Esophagus is nondistended.  CHEST WALL AND LOWER NECK: Right chest wall port. Additional shunt catheter is seen along the right anterior subcutaneous soft tissues, terminating in the right mid abdomen. Visualized thyroid is unremarkable.    ABDOMEN AND PELVIS:  LIVER: Hepatic metastatic disease most prominent of the right hepatic lobe. For reference, confluent right hepatic tumor measures 9.4 cm in transverse dimension, image 135 series 2, with suggested capsular   disruption on image 147 series 2. Subcentimeter left hepatic hypodense lesions are identified, too small to characterize. Additional metastatic disease cannot be excluded. Peritoneal disease results in hepatic   capsular implants, for example along the medial left hepatic lobe, image 134 series 2. Main portal vein is patent.  BILE DUCTS: No distention  GALLBLADDER: Likely surgically absent  SPLEEN: Enlarged, 14 cm craniocaudal dimension. Capsular implants secondary to peritoneal disease, for example image 109 series 2.   Additional small hypodensities of the spleen are suboptimally characterized.  PANCREAS: No acute peripancreatic inflammation. 1 cm cystic lesion of the pancreatic body similar to prior MRI from 2017. 13 mm pancreatic tail hypodense lesion, image 141series 2 previously measured 17 mm in 2017.  ADRENALS: Nodular thickening of the right adrenal is new since 2017. Slightly thickened left adrenal as well.  KIDNEYS/URETERS: No hydronephrosis. Subcentimeter hypodensities too small to characterize.    BLADDER: Minimally distended.  REPRODUCTIVE ORGANS: Enlarged prostate    BOWEL: Stomach is underdistended. No small bowel distention. No secondary signs of acute appendicitis. Mild stool burden of the colon limits evaluation of the mucosa. Rectosigmoid anastomosis.  PERITONEUM: Moderate ascites. Extensive peritoneal/omental carcinomatosis. For reference, right-sided peritoneal mass lesion measures 7.6 x 2.6 cm, image 156 series 2. Carcinomatosis also results in capsular implants along the liver and spleen. Serosal disease of the bowel is also likely present for example at the colonic hepatic flexure on image 148 series 2. Shunt catheter terminates along the right mid abdomen.  VESSELS: No abdominal aortic aneurysm. Atheromatous changes.   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  RETROPERITONEUM/LYMPH NODES: Few abnormal lymph nodes, for example at right cardiophrenic station measuring 1 cm, image 113 series 2.   Periceliac node measures 12 mm, image 137 series 2.  ABDOMINAL WALL: Postsurgical changes.  BONES: Degenerative changes. Spinal scoliotic curvature. L5-S1 fusion hardware with associated laminectomy. L1 vertebral body with bilateral 13th ribs. Few sclerotic foci of the bones are indeterminate. For   reference, 7 mm left iliac wing sclerotic lesion, image 237 series 2.    IMPRESSION:    Extensive metastatic neoplasm of the lungs and liver in this patient with history of colon cancer. Extensive peritoneal/omental carcinomatosis and moderate ascites. Capsular disease of the liver and spleen. Likely serosal disease along the colon. Few pathologic lymph nodes identified. Nodular thickening of the right adrenal gland, possibly related to neoplastic disease as well.    Large right pleural effusion with adjacent compressive atelectasis of the right lung.    Few sclerotic foci of the bones are indeterminate. For reference, 7 mm left iliac wing sclerotic lesion, image 237 series 2. Correlation with bone scan is recommended.    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    --- End of Report ---    VEDA SANTILLAN M.D., ATTENDING RADIOLOGIST  This document has been electronically signed. May  5 2023  4:35PM    < end of copied text >

## 2023-05-05 NOTE — PROGRESS NOTE ADULT - PROBLEM SELECTOR PLAN 2
Family has opted for DNR/DNI/DNH with no labs and comfort measures  Family expressed continued concern for CA, does not want to pursue treatment but wants a CEA and CT AP  FU CEA  CU CT CAP with con  SW following  Palliative care following  Recommendations appreciated

## 2023-05-05 NOTE — PROGRESS NOTE ADULT - PROBLEM SELECTOR PLAN 6
77 year old male, from home hospice, with PMHx HTN, HLD, BPH, PAF (on eliquis), colon cancer w/ mets (s/p chemo and surgery), and NPH (w/  shunt), dementia presented with left sided body pain, generalized weakness, and unable to ambulate for 3 days. Pt was at home with [facility] hospice care.  Family can no longer provide appropriate care for him. They are requesting LTC with hospice.    Please see new GOC conversation, including new radiological evidence of worse / extensive carcinoma of colon metastatic to multiple sites. 77 year old male, from home hospice, with PMHx HTN, HLD, BPH, PAF (on Eliquis), now with extensive carcinoma of colon metastatic to multiple sites (s/p chemo and surgery), and NPH (w/  shunt), and advanced dementia.     Patient is at high risk for mortality, morbidity, and infections.    [facility]  SNF with hospice care services    Code Status: DNR, DNI    Palliative Care will follow

## 2023-05-06 NOTE — PROGRESS NOTE ADULT - SUBJECTIVE AND OBJECTIVE BOX
HPI:  77 year old male, from home hospice, with PMHx HTN, HLD, BPH, PAF (on eliquis), colon cancer w/ mets (s/p chemo and surgery), and NPH (w/  shunt), dementia presented with left sided body pain, generalized weakness, and unable to ambulate for 3 days. Pt AAOx1, collateral obtained from daughter and HCP Irina at bedside. Per daughter, pt was enrolled in [facility] in October 2022 due to decline in mental and functional status after surgery for colon cancer, when decision was made to focus on noninvasive intervention. Patient has had increased pain at home, increased urinary and bowel incontinence and has not been able to ambulate due to pain. Patient reports headache, dizziness, chest pain, and right lower extremity pain. Patient denies all other complaints including fever/chills, cough, n/v/d/c, dysuria or leg swelling. NKDA    (01 May 2023 19:07)      OVERNIGHT EVENTS:    No new overnight events.  Seen and examined at bedside.     MEDICATIONS  (STANDING):  amLODIPine   Tablet 5 milliGRAM(s) Oral daily  apixaban 5 milliGRAM(s) Oral every 12 hours  atorvastatin 20 milliGRAM(s) Oral at bedtime  cholecalciferol 5000 Unit(s) Oral daily  cyanocobalamin 1000 MICROGram(s) Oral daily  enalapril 20 milliGRAM(s) Oral every 12 hours  finasteride 5 milliGRAM(s) Oral daily  lidocaine   4% Patch 1 Patch Transdermal daily  metoprolol succinate ER 50 milliGRAM(s) Oral daily  naloxone Injectable 0.4 milliGRAM(s) IV Push once  polyethylene glycol 3350 17 Gram(s) Oral daily  senna 2 Tablet(s) Oral at bedtime  sertraline 100 milliGRAM(s) Oral daily    MEDICATIONS  (PRN):  acetaminophen     Tablet .. 650 milliGRAM(s) Oral every 6 hours PRN Temp greater or equal to 38C (100.4F), Mild Pain (1 - 3)  aluminum hydroxide/magnesium hydroxide/simethicone Suspension 30 milliLiter(s) Oral every 4 hours PRN Dyspepsia  bisacodyl 5 milliGRAM(s) Oral daily PRN Constipation  ketorolac   Injectable 15 milliGRAM(s) IV Push every 6 hours PRN Moderate Pain (4 - 6)  LORazepam     Tablet 0.5 milliGRAM(s) Oral every 6 hours PRN Anxiety  melatonin 3 milliGRAM(s) Oral at bedtime PRN Insomnia  ondansetron Injectable 4 milliGRAM(s) IV Push every 8 hours PRN Nausea and/or Vomiting  oxyCODONE    IR 2.5 milliGRAM(s) Oral every 4 hours PRN Moderate Pain (4 - 6)  oxyCODONE    IR 5 milliGRAM(s) Oral every 4 hours PRN Severe Pain (7 - 10)    Vital Signs Last 24 Hrs  T(C): 37.1 (05-06-23 @ 21:13), Max: 37.1 (05-06-23 @ 04:55)  T(F): 98.7 (05-06-23 @ 21:13), Max: 98.8 (05-06-23 @ 04:55)  HR: 92 (05-06-23 @ 21:13) (90 - 92)  BP: 129/87 (05-06-23 @ 21:13) (129/87 - 149/90)  BP(mean): --  RR: 16 (05-06-23 @ 21:13) (16 - 18)  SpO2: 96% (05-06-23 @ 21:13) (95% - 96%)    ________________________________________________  PHYSICAL EXAM:    GENERAL: NAD  HEENT: Normocephalic; conjunctivae and sclerae clear;  NECK : supple, no JVD  CHEST/LUNG: dec breath sounds at bases  HEART: S1 S2  regular  ABDOMEN: Soft, Nontender, Nondistended; Bowel sounds present  EXTREMITIES: no cyanosis; no LE edema; no calf tenderness  NERVOUS SYSTEM:  Alert; SKIN: warm and dry; No rashes or lesions    _________________________________________________  CURRENT MEDICATIONS:    MEDICATIONS  (STANDING):  amLODIPine   Tablet 5 milliGRAM(s) Oral daily  apixaban 5 milliGRAM(s) Oral every 12 hours  atorvastatin 20 milliGRAM(s) Oral at bedtime  cholecalciferol 5000 Unit(s) Oral daily  cyanocobalamin 1000 MICROGram(s) Oral daily  enalapril 20 milliGRAM(s) Oral every 12 hours  finasteride 5 milliGRAM(s) Oral daily  lidocaine   4% Patch 1 Patch Transdermal daily  metoprolol succinate ER 50 milliGRAM(s) Oral daily  naloxone Injectable 0.4 milliGRAM(s) IV Push once  polyethylene glycol 3350 17 Gram(s) Oral daily  senna 2 Tablet(s) Oral at bedtime  sertraline 100 milliGRAM(s) Oral daily    MEDICATIONS  (PRN):  acetaminophen     Tablet .. 650 milliGRAM(s) Oral every 6 hours PRN Temp greater or equal to 38C (100.4F), Mild Pain (1 - 3)  aluminum hydroxide/magnesium hydroxide/simethicone Suspension 30 milliLiter(s) Oral every 4 hours PRN Dyspepsia  bisacodyl 5 milliGRAM(s) Oral daily PRN Constipation  ketorolac   Injectable 15 milliGRAM(s) IV Push every 6 hours PRN Moderate Pain (4 - 6)  LORazepam     Tablet 0.5 milliGRAM(s) Oral every 6 hours PRN Anxiety  melatonin 3 milliGRAM(s) Oral at bedtime PRN Insomnia  ondansetron Injectable 4 milliGRAM(s) IV Push every 8 hours PRN Nausea and/or Vomiting  oxyCODONE    IR 2.5 milliGRAM(s) Oral every 4 hours PRN Moderate Pain (4 - 6)  oxyCODONE    IR 5 milliGRAM(s) Oral every 4 hours PRN Severe Pain (7 - 10)      __________________________________________________  LABS:                CAPILLARY BLOOD GLUCOSE          __________________________________________________  RADIOLOGY & ADDITIONAL TESTS:    Imaging Personally Reviewed:  YES    < from: Xray Chest 1 View AP/PA (05.01.23 @ 16:48) >  IMPRESSION:  1. There is a new right pleural effusion with no evidence of pneumonia or   CHF.  2. There is a new right sided infusion catheter, terminating in the right   atrium, without pneumothorax.  3. Right-sided  shunt, unchanged.    < end of copied text >    Consultant(s) Notes Reviewed:   YES     Plan of care was discussed with patient and /or primary care giver; all questions and concerns were addressed and care was aligned with patient's wishes.    Plan discussed with attending and consulting physicians.

## 2023-05-07 NOTE — DISCHARGE NOTE PROVIDER - HOSPITAL COURSE
77 year old male, from home hospice, with PMHx HTN, HLD, BPH, PAF (on eliquis), colon cancer w/ mets (s/p chemo and surgery), and NPH (w/  shunt), dementia presented with left sided body pain, generalized weakness, and unable to ambulate for 3 days. Pt AAOx1, collateral obtained from daughter and HCP Irina at bedside. Per daughter, pt was enrolled in Health system in 2022 due to decline in mental and functional status after surgery for colon cancer, when decision was made to focus on noninvasive intervention. Patient has had increased pain at home, increased urinary and bowel incontinence and has not been able to ambulate due to pain. Patient reports headache, dizziness, chest pain, and right lower extremity pain. CT Chest w/ IV Cont (23) showed extensive carcinoma of colon metastatic to multiple sites. Patient needs ongoing symptoms' management and is hospice eligible. Clinical evidence indicates that the patient has Severe protein calorie malnutrition/ 3rd degree. Albumin 1.9.  Moderate  po intake prior to admission. Patient is at high risk for mortality, morbidity, and infections.  ECO, recommended SNF with hospice care services.    Given patient's clinical status and current hemodynamic stability, decision was made to discharge to [facility] with hospice care.  Please refer to patient's complete medical chart with documents for a full hospital course, for this is only a brief summary.

## 2023-05-07 NOTE — PROGRESS NOTE ADULT - SUBJECTIVE AND OBJECTIVE BOX
HPI:  77 year old male, from home hospice, with PMHx HTN, HLD, BPH, PAF (on eliquis), colon cancer w/ mets (s/p chemo and surgery), and NPH (w/  shunt), dementia presented with left sided body pain, generalized weakness, and unable to ambulate for 3 days. Pt AAOx1, collateral obtained from daughter and HCP Irina at bedside. Per daughter, pt was enrolled in [facility] in October 2022 due to decline in mental and functional status after surgery for colon cancer, when decision was made to focus on noninvasive intervention. Patient has had increased pain at home, increased urinary and bowel incontinence and has not been able to ambulate due to pain. Patient reports headache, dizziness, chest pain, and right lower extremity pain. Patient denies all other complaints including fever/chills, cough, n/v/d/c, dysuria or leg swelling. NKDA    (01 May 2023 19:07)      OVERNIGHT EVENTS:    No new overnight events.  Seen and examined at bedside.     MEDICATIONS  (STANDING):  amLODIPine   Tablet 5 milliGRAM(s) Oral daily  apixaban 5 milliGRAM(s) Oral every 12 hours  atorvastatin 20 milliGRAM(s) Oral at bedtime  cholecalciferol 5000 Unit(s) Oral daily  cyanocobalamin 1000 MICROGram(s) Oral daily  enalapril 20 milliGRAM(s) Oral every 12 hours  finasteride 5 milliGRAM(s) Oral daily  lidocaine   4% Patch 1 Patch Transdermal daily  metoprolol succinate ER 50 milliGRAM(s) Oral daily  naloxone Injectable 0.4 milliGRAM(s) IV Push once  polyethylene glycol 3350 17 Gram(s) Oral daily  senna 2 Tablet(s) Oral at bedtime  sertraline 100 milliGRAM(s) Oral daily    MEDICATIONS  (PRN):  acetaminophen     Tablet .. 650 milliGRAM(s) Oral every 6 hours PRN Temp greater or equal to 38C (100.4F), Mild Pain (1 - 3)  aluminum hydroxide/magnesium hydroxide/simethicone Suspension 30 milliLiter(s) Oral every 4 hours PRN Dyspepsia  bisacodyl 5 milliGRAM(s) Oral daily PRN Constipation  LORazepam     Tablet 0.5 milliGRAM(s) Oral every 6 hours PRN Anxiety  melatonin 3 milliGRAM(s) Oral at bedtime PRN Insomnia  ondansetron Injectable 4 milliGRAM(s) IV Push every 8 hours PRN Nausea and/or Vomiting  oxyCODONE    IR 5 milliGRAM(s) Oral every 4 hours PRN Severe Pain (7 - 10)  oxyCODONE    IR 2.5 milliGRAM(s) Oral every 4 hours PRN Moderate Pain (4 - 6)    Vital Signs Last 24 Hrs  T(C): 37.5 (05-07-23 @ 21:05), Max: 37.5 (05-07-23 @ 21:05)  T(F): 99.5 (05-07-23 @ 21:05), Max: 99.5 (05-07-23 @ 21:05)  HR: 97 (05-07-23 @ 21:05) (93 - 99)  BP: 137/92 (05-07-23 @ 21:05) (126/93 - 141/85)  BP(mean): --  RR: 18 (05-07-23 @ 21:05) (16 - 18)  SpO2: 96% (05-07-23 @ 21:05) (93% - 96%)      ________________________________________________  PHYSICAL EXAM:    GENERAL: NAD  HEENT: Normocephalic; conjunctivae and sclerae clear;  NECK : supple, no JVD  CHEST/LUNG: dec breath sounds at bases  HEART: S1 S2  regular  ABDOMEN: Soft, Nontender, Nondistended; Bowel sounds present  EXTREMITIES: no cyanosis; no LE edema; no calf tenderness  NERVOUS SYSTEM:  Alert; SKIN: warm and dry; No rashes or lesions    _    __________________________________________________  LABS:                CAPILLARY BLOOD GLUCOSE          __________________________________________________  RADIOLOGY & ADDITIONAL TESTS:    Imaging Personally Reviewed:  YES    < from: Xray Chest 1 View AP/PA (05.01.23 @ 16:48) >  IMPRESSION:  1. There is a new right pleural effusion with no evidence of pneumonia or   CHF.  2. There is a new right sided infusion catheter, terminating in the right   atrium, without pneumothorax.  3. Right-sided  shunt, unchanged.    < end of copied text >    Consultant(s) Notes Reviewed:   YES     Plan of care was discussed with patient and /or primary care giver; all questions and concerns were addressed and care was aligned with patient's wishes.    Plan discussed with attending and consulting physicians.

## 2023-05-07 NOTE — DISCHARGE NOTE PROVIDER - NSDCMRMEDTOKEN_GEN_ALL_CORE_FT
amLODIPine 5 mg oral tablet: 1 tab(s) orally once a day  cholecalciferol 125 mcg (5000 intl units) oral tablet: 1 tab(s) orally once a day  cyanocobalamin 1000 mcg oral tablet: 1 orally  Eliquis 5 mg oral tablet: 1 tab(s) orally 2 times a day  enalapril 20 mg oral tablet: 1 tab(s) orally once a day  finasteride 5 mg oral tablet: 1 tab(s) orally once a day  metoprolol succinate 50 mg oral capsule, extended release: 1 cap(s) orally once a day  pravastatin 80 mg oral tablet: 1 tab(s) orally once a day (at bedtime)  sertraline 100 mg oral tablet: 1 tab(s) orally once a day   amLODIPine 5 mg oral tablet: 1 tab(s) orally once a day  bisacodyl 5 mg oral delayed release tablet: 1 tab(s) orally once a day As needed Constipation  cholecalciferol 125 mcg (5000 intl units) oral tablet: 1 tab(s) orally once a day  cyanocobalamin 1000 mcg oral tablet: 1 tablet orally once a day  Eliquis 5 mg oral tablet: 1 tab(s) orally 2 times a day  enalapril 20 mg oral tablet: 1 tab(s) orally once a day  finasteride 5 mg oral tablet: 1 tab(s) orally once a day  lidocaine 4% topical film: Apply topically to affected area once a day  metoprolol succinate 50 mg oral capsule, extended release: 1 cap(s) orally once a day  oxyCODONE: 2.5 milligram(s) orally every 4 hours as needed for  moderate pain  oxyCODONE 5 mg oral tablet: 1 tab(s) orally every 4 hours As needed Severe Pain (7 - 10)  polyethylene glycol 3350 oral powder for reconstitution: 17 gram(s) orally once a day  pravastatin 80 mg oral tablet: 1 tab(s) orally once a day (at bedtime)  senna leaf extract oral tablet: 2 tab(s) orally once a day (at bedtime)  sertraline 100 mg oral tablet: 1 tab(s) orally once a day

## 2023-05-07 NOTE — DISCHARGE NOTE PROVIDER - NSDCCPCAREPLAN_GEN_ALL_CORE_FT
PRINCIPAL DISCHARGE DIAGNOSIS  Diagnosis: Ambulatory dysfunction  Assessment and Plan of Treatment:       SECONDARY DISCHARGE DIAGNOSES  Diagnosis: HTN (hypertension)  Assessment and Plan of Treatment: Low salt diet  Activity as tolerated.  Take all medication as prescribed.  Follow up with your medical doctor for routine blood pressure monitoring at your next visit.  Notify your doctor if you have any of the following symptoms:   Dizziness, Lightheadedness, Blurry vision, Headache, Chest pain, Shortness of breath      Diagnosis: Paroxysmal atrial fibrillation  Assessment and Plan of Treatment: Atrial fibrillation is the most common heart rhythm problem & has the risk of stroke & heart attack  It helps if you control your blood pressure, not drink more than 1-2 alcohol drinks per day, cut down on caffeine, getting treatment for over active thyroid gland, & getting exercise  Call your doctor if you feel your heart racing or beating unusually, chest tightness or pain, lightheaded, faint, shortness of breath especially with exercise  It is important to take your heart medication as prescribed  You may be on anticoagulation which is very important to take as directed - you may need blood work to monitor drug levels      Diagnosis: Severe protein-calorie malnutrition  Assessment and Plan of Treatment: Clinical evidence indicates that the patient has Severe protein calorie malnutrition/ 3rd degree. Albumin 1.9.  Moderate  po intake prior to admission. Patient is at high risk for mortality, morbidity, and infections.  ECO, recommended SNF with hospice care services.     PRINCIPAL DISCHARGE DIAGNOSIS  Diagnosis: Carcinoma of colon metastatic to multiple sites  Assessment and Plan of Treatment: CT scan of your abdomen shows extensive metastatic neoplasm of the lungs and liver with history of primary colon cancer.   You were followed by palliative team and found to be hospice eligible.  -Pain meds as needed        SECONDARY DISCHARGE DIAGNOSES  Diagnosis: HTN (hypertension)  Assessment and Plan of Treatment: Low salt diet  Activity as tolerated.  Take all medication as prescribed.  Follow up with your medical doctor for routine blood pressure monitoring at your next visit.  Notify your doctor if you have any of the following symptoms:   Dizziness, Lightheadedness, Blurry vision, Headache, Chest pain, Shortness of breath      Diagnosis: Ambulatory dysfunction  Assessment and Plan of Treatment: due to generalized weakness due to metastatic disease.  You were brought to hospital with generalized weakness unable to ambulate.  You are now eligible for long term care with hospice to maintain your safety and optimize your comfort level.    Diagnosis: Pain  Assessment and Plan of Treatment: due to metastatic disease.  Take pain medications as prescribed and as needed.  Non pharmaceutical modalities such as hot/cold packs, positioning, relaxation exercises etc.    Diagnosis: Paroxysmal atrial fibrillation  Assessment and Plan of Treatment: Atrial fibrillation is the most common heart rhythm problem & has the risk of stroke & heart attack  It helps if you control your blood pressure, not drink more than 1-2 alcohol drinks per day, cut down on caffeine, getting treatment for over active thyroid gland, & getting exercise  Call your doctor if you feel your heart racing or beating unusually, chest tightness or pain, lightheaded, faint, shortness of breath especially with exercise  It is important to take your heart medication as prescribed  You may be on anticoagulation which is very important to take as directed - you may need blood work to monitor drug levels      Diagnosis: Severe protein-calorie malnutrition  Assessment and Plan of Treatment: Clinical evidence indicates that the patient has Severe protein calorie malnutrition/ 3rd degree. Albumin 1.9.  Moderate  po intake prior to admission. Patient is at high risk for mortality, morbidity, and infections.  ECO, recommended SNF with hospice care services.

## 2023-05-07 NOTE — DISCHARGE NOTE PROVIDER - CARE PROVIDER_API CALL
[place], [facility]  [address] [place] [address]  Phone: [phone]  Fax: [phone]  Established Patient  Follow Up Time: 1 week

## 2023-05-08 NOTE — PROGRESS NOTE ADULT - PROBLEM SELECTOR PLAN 2
Family has opted for DNR/DNI/DNH with no labs and comfort measures  Family expressed continued concern for CA, does not want to pursue treatment but wants a CEA and CT AP  Ca markers elevated  SW following  Palliative care following  Recommendations appreciated  Awaiting d/c to [facility] with Hospice

## 2023-05-08 NOTE — PROGRESS NOTE ADULT - SUBJECTIVE AND OBJECTIVE BOX
Patient is a 77y old  Male who presents with a chief complaint of Intractable pain (07 May 2023 16:03)      INTERVAL HPI/OVERNIGHT EVENTS: pt seen and examined  MEDICATIONS  (STANDING):  amLODIPine   Tablet 5 milliGRAM(s) Oral daily  apixaban 5 milliGRAM(s) Oral every 12 hours  atorvastatin 20 milliGRAM(s) Oral at bedtime  cholecalciferol 5000 Unit(s) Oral daily  cyanocobalamin 1000 MICROGram(s) Oral daily  enalapril 20 milliGRAM(s) Oral every 12 hours  finasteride 5 milliGRAM(s) Oral daily  lidocaine   4% Patch 1 Patch Transdermal daily  metoprolol succinate ER 50 milliGRAM(s) Oral daily  naloxone Injectable 0.4 milliGRAM(s) IV Push once  polyethylene glycol 3350 17 Gram(s) Oral daily  senna 2 Tablet(s) Oral at bedtime  sertraline 100 milliGRAM(s) Oral daily    MEDICATIONS  (PRN):  acetaminophen     Tablet .. 650 milliGRAM(s) Oral every 6 hours PRN Temp greater or equal to 38C (100.4F), Mild Pain (1 - 3)  aluminum hydroxide/magnesium hydroxide/simethicone Suspension 30 milliLiter(s) Oral every 4 hours PRN Dyspepsia  bisacodyl 5 milliGRAM(s) Oral daily PRN Constipation  LORazepam     Tablet 0.5 milliGRAM(s) Oral every 6 hours PRN Anxiety  melatonin 3 milliGRAM(s) Oral at bedtime PRN Insomnia  ondansetron Injectable 4 milliGRAM(s) IV Push every 8 hours PRN Nausea and/or Vomiting  oxyCODONE    IR 2.5 milliGRAM(s) Oral every 4 hours PRN Moderate Pain (4 - 6)  oxyCODONE    IR 5 milliGRAM(s) Oral every 4 hours PRN Severe Pain (7 - 10)      Vital Signs Last 24 Hrs  T(C): 36.6 (08 May 2023 05:23), Max: 37.5 (07 May 2023 21:05)  T(F): 97.9 (08 May 2023 05:23), Max: 99.5 (07 May 2023 21:05)  HR: 94 (08 May 2023 05:23) (93 - 99)  BP: 131/83 (08 May 2023 05:23) (126/93 - 141/85)  BP(mean): --  RR: 18 (08 May 2023 05:23) (16 - 18)  SpO2: 96% (08 May 2023 05:23) (94% - 96%)    Parameters below as of 08 May 2023 05:23  Patient On (Oxygen Delivery Method): nasal cannula  O2 Flow (L/min): 2      ________________________________________________  PHYSICAL EXAM:  Chronically ill appearing, lethargic, comfortable  GENERAL: NAD  HEENT: hoarse voice, Normocephalic;  conjunctivae and sclerae clear; moist mucous membranes;   NECK : supple  CHEST/LUNG: dec breath sounds at bases   HEART: S1 S2  regular; no murmurs, gallops or rubs  ABDOMEN: Soft, Nontender, Nondistended; Bowel sounds present  EXTREMITIES: no cyanosis; no edema; no calf tenderness  SKIN: warm and dry; no rash  NERVOUS SYSTEM:  Awake and alert;  Unable to obtain due to mentation  LABS:    CAPILLARY BLOOD GLUCOSE    RADIOLOGY & ADDITIONAL TESTS:      < from: CT Chest w/ IV Cont (05.05.23 @ 13:36) >    ACC: [id number] EXAM:  CT ABDOMEN AND PELVIS OC IC   ORDERED BY: RINKU LEMONS     ACC: [id number] EXAM:  CT CHEST IC   ORDERED BY: RINKU LEMONS     PROCEDURE DATE:  05/05/2023          INTERPRETATION:  CLINICAL INFORMATION: Colon cancer with metastatic   disease    COMPARISON: MRI abdomen 10/8/2017.    CONTRAST/COMPLICATIONS:  IV Contrast: Omnipaque 350 (accession [id number]), IV contrast documented   in unlinked concurrent exam (accession [id number])  90 cc administered   10   cc discarded  Oral Contrast: NONE (accession [id number]), Gastroview (accession [id number])  Complications: None reported at time of study completion    PROCEDURE:  CT of the Chest, Abdomen and Pelvis was performed.  Sagittal and coronal reformats were performed.    FINDINGS:    CHEST:  LUNGS AND LARGE AIRWAYS: Respiratory motion limits evaluation of the   airways. Trace airway secretions. Large right pleural effusion with   adjacent lung atelectasis. Trace left pleural effusion with adjacent left   basilar atelectasis. Multiple metastatic pulmonary nodules, for reference   measuring 15 mm of the right upper lobe, image 48 series 2 and 13 mm of   the left lower lobe image 75 series 2.  PLEURA: Large right pleural effusion with adjacent compressive   atelectasis. Trace left pleural effusion with adjacent atelectasis. No   pneumothorax.  VESSELS: Port catheter terminates at the cavoatrial junction. Normal   caliber of the thoracic aorta with atheromatous changes. Main pulmonary   artery size is within normal limits.  HEART: Heart size is within normal limits. No pericardial effusion.  MEDIASTINUM AND LIBBY: Right cardiophrenic node measures 1 cm, image 113   series 2, enlarged and pathologic appearing. Additional small volume   nodes of the thorax. Esophagus is nondistended.  CHEST WALL AND LOWER NECK: Right chest wall port. Additional shunt   catheter is seen along the right anterior subcutaneous soft tissues,   terminating in the right mid abdomen. Visualized thyroid is unremarkable.    ABDOMEN AND PELVIS:  LIVER: Hepatic metastatic disease most prominent of the right hepatic   lobe. For reference, confluent right hepatic tumor measures 9.4 cm in   transverse dimension, image 135 series 2, with suggested capsular   disruption on image 147 series 2. Subcentimeter left hepatic hypodense   lesions are identified, too small to characterize. Additional metastatic   disease cannot be excluded. Peritoneal disease results in hepatic   capsular implants, for example along the medial left hepatic lobe, image  134 series 2. Main portal vein is patent.  BILE DUCTS: No distention  GALLBLADDER: Likely surgically absent  SPLEEN: Enlarged, 14 cm craniocaudal dimension. Capsular implants   secondary to peritoneal disease, for example image 109 series 2.   Additional small hypodensities of the spleen are suboptimally   characterized.  PANCREAS: No acute peripancreatic inflammation. 1 cm cystic lesion of the   pancreatic body similar to prior MRI from 2017. 13 mm pancreatic tail   hypodense lesion, image 141series 2 previously measured 17 mm in 2017.  ADRENALS: Nodular thickening of the right adrenal is new since 2017.   Slightly thickened left adrenal as well.  KIDNEYS/URETERS: No hydronephrosis. Subcentimeter hypodensities too small   to characterize.    BLADDER: Minimally distended.  REPRODUCTIVE ORGANS: Enlarged prostate    BOWEL: Stomach is underdistended. No small bowel distention. No secondary   signs of acute appendicitis. Mild stool burden of the colon limits   evaluation of the mucosa. Rectosigmoid anastomosis.  PERITONEUM: Moderate ascites. Extensive peritoneal/omental   carcinomatosis. For reference, right-sided peritoneal mass lesion   measures 7.6 x 2.6 cm, image 156 series 2. Carcinomatosis also results in   capsular implants along the liver and spleen. Serosal disease of the   bowel is also likely present for example at the colonic hepatic flexure   on image 148 series 2. Shunt catheter terminates along the right mid   abdomen.  VESSELS: No abdominal aortic aneurysm. Atheromatous changes.   Redemonstrated proximal/mid SMA dissection, with aneurysmal dilatation up   to 16 mm, similar to prior imaging from 2017 given differences in MRI and   CT technique.  RETROPERITONEUM/LYMPH NODES: Few abnormal lymph nodes, for example at   right cardiophrenic station measuring 1 cm, image 113 series 2.   Periceliac node measures 12 mm, image 137 series 2.  ABDOMINAL WALL: Postsurgical changes.  BONES: Degenerative changes. Spinal scoliotic curvature. L5-S1 fusion   hardware with associated laminectomy. L1 vertebral body with bilateral   13th ribs. Few sclerotic foci of the bones are indeterminate. For   reference, 7 mm left iliac wing sclerotic lesion, image 237 series 2.    IMPRESSION:    Extensive metastatic neoplasm of the lungs and liver in this patient with   history of colon cancer. Extensive peritoneal/omental carcinomatosis and   moderate ascites. Capsular disease of the liver and spleen. Likely   serosal disease along the colon. Few pathologic lymph nodes identified.   Nodular thickening of the right adrenal gland, possibly related to   neoplastic disease as well.    Large right pleural effusion with adjacent compressive atelectasis of the   right lung.    Few sclerotic foci of the bones are indeterminate. For reference, 7 mm   left iliac wing sclerotic lesion, image 237 series 2. Correlation with   bone scan is recommended.    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    < end of copied text >      < from: Xray Chest 1 View AP/PA (05.01.23 @ 16:48) >    ACC: [id number] EXAM:  XR CHEST AP OR PA 1V   ORDERED BY: TRISTA DUPONT     PROCEDURE DATE:  05/01/2023          INTERPRETATION:  INDICATION: Fall    COMPARISON: 1/20/2021    FINDINGS:  An AP semiupright view of the chest demonstrates a right-sidedVP shunt,   unchanged. There is a right-sided infusion catheter terminating in the   right atrium without pneumothorax. There is a shallow inspiratory effort   and while no focal infiltrates are seen and there is no pneumothorax,   there is a new right pleural effusion. There is no pleural effusion on   the left. There is no hilar or mediastinal widening. The cardiac   silhouette may be magnified by technique. There is no pulmonary edema.   The bony thorax remains unchanged.    IMPRESSION:  1. There is a new right pleural effusion with no evidence of pneumonia or   CHF.  2. There is a new right sided infusion catheter, terminating in the right   atrium, without pneumothorax.  3. Right-sided  shunt, unchanged.    < end of copied text >    < from: Xray Pelvis AP only (05.01.23 @ 16:47) >    ACC: [id number] EXAM:  XR PELVIS AP ONLY 1-2 VIEWS   ORDERED BY: TRISTA DUPONT     PROCEDURE DATE:  05/01/2023          INTERPRETATION:  An AP view of the pelvis was performed to evaluate for   fracture.    Comparison is made to 4/7/2023.    There is no evidence of acute fracture or dislocation. There is no   diastases of the SI joints or symphysis pubis. Both pubic rings are   grossly intact. There are only mild degenerative changes of both hips.   Calcifications adjacent to the peripheral acetabulum on both sides could   represent accessory ossicles or related to an old remote labral injury.   The patient is undergone lower lumbar midline laminectomy with surgical   fusion, including metallic hardware and apparatus inserting on both sides   at L4-L5. There are surgical clips within the left para midline pelvis.    IMPRESSION:  1. No acute fracture or dislocation is seen and there is no significant   change from the prior exam.  2. Postoperative findings as outlined above, remain stable.  3. Mild degenerative changes of both hips with calcifications adjacent to   the acetabular roof on both sides, consistent with either accessory   ossicles or old remote labral injury.    < end of copied text >      Imaging Personally Reviewed:  YES/NO    Consultant(s) Notes Reviewed:   YES/ No    Care Discussed with Consultants :     Plan of care was discussed with patient and /or primary care giver; all questions and concerns were addressed and care was aligned with patient's wishes.

## 2023-05-08 NOTE — PROGRESS NOTE ADULT - PROBLEM SELECTOR PLAN 3
without behavioral issues. bedbound. Total care.  Incontinent x 2, non ambulatory.    FAST Score: 7C  SNF, [facility] with hospice care services    -Supportive care

## 2023-05-08 NOTE — PROGRESS NOTE ADULT - PROBLEM SELECTOR PLAN 2
Likely getting worse. Moderate-Severe. Patient does not seem able to request pain medication. Presented grimacing and looks uncomfortable.    Recommendations  Highly consider OxyCODONE IR 5 mg Oral Q 8 hrs ATC  c/w OxyCODONE IR 5 mg Oral Q 4 hrs PRN for Moderate Pain-Severe Pain  c/w RN to reassess pain Q 4 hrs  c/w Acetaminophen Tablet .. 650 mg Oral Q 6 hrs PRN for Mild Pain (1 - 3)   c/w Lidocaine 4% Patch 1 Patch Transdermal daily     > May also consider switching Oxycodone to Morphine Solution (Roxanol) SL/Oral

## 2023-05-08 NOTE — PROGRESS NOTE ADULT - SUBJECTIVE AND OBJECTIVE BOX
NP Note discussed with  Primary Attending    Patient is a 77y old  Male who presents with a chief complaint of Intractable pain (07 May 2023 16:03)      INTERVAL HPI/OVERNIGHT EVENTS: no new complaints    MEDICATIONS  (STANDING):  amLODIPine   Tablet 5 milliGRAM(s) Oral daily  apixaban 5 milliGRAM(s) Oral every 12 hours  atorvastatin 20 milliGRAM(s) Oral at bedtime  cholecalciferol 5000 Unit(s) Oral daily  cyanocobalamin 1000 MICROGram(s) Oral daily  enalapril 20 milliGRAM(s) Oral every 12 hours  finasteride 5 milliGRAM(s) Oral daily  lidocaine   4% Patch 1 Patch Transdermal daily  metoprolol succinate ER 50 milliGRAM(s) Oral daily  naloxone Injectable 0.4 milliGRAM(s) IV Push once  polyethylene glycol 3350 17 Gram(s) Oral daily  senna 2 Tablet(s) Oral at bedtime  sertraline 100 milliGRAM(s) Oral daily    MEDICATIONS  (PRN):  acetaminophen     Tablet .. 650 milliGRAM(s) Oral every 6 hours PRN Temp greater or equal to 38C (100.4F), Mild Pain (1 - 3)  aluminum hydroxide/magnesium hydroxide/simethicone Suspension 30 milliLiter(s) Oral every 4 hours PRN Dyspepsia  bisacodyl 5 milliGRAM(s) Oral daily PRN Constipation  LORazepam     Tablet 0.5 milliGRAM(s) Oral every 6 hours PRN Anxiety  melatonin 3 milliGRAM(s) Oral at bedtime PRN Insomnia  ondansetron Injectable 4 milliGRAM(s) IV Push every 8 hours PRN Nausea and/or Vomiting  oxyCODONE    IR 2.5 milliGRAM(s) Oral every 4 hours PRN Moderate Pain (4 - 6)  oxyCODONE    IR 5 milliGRAM(s) Oral every 4 hours PRN Severe Pain (7 - 10)      __________________________________________________  REVIEW OF SYSTEMS:    CONSTITUTIONAL: No fever,   EYES: no acute visual disturbances  NECK: No pain or stiffness  RESPIRATORY: No cough; No shortness of breath  CARDIOVASCULAR: No chest pain, no palpitations  GASTROINTESTINAL: No pain. No nausea or vomiting; No diarrhea   NEUROLOGICAL: No headache or numbness, no tremors  MUSCULOSKELETAL: No joint pain, no muscle pain  GENITOURINARY: no dysuria, no frequency, no hesitancy  PSYCHIATRY: no depression , no anxiety  ALL OTHER  ROS negative        Vital Signs Last 24 Hrs  T(C): 36.6 (08 May 2023 05:23), Max: 37.5 (07 May 2023 21:05)  T(F): 97.9 (08 May 2023 05:23), Max: 99.5 (07 May 2023 21:05)  HR: 94 (08 May 2023 05:23) (93 - 99)  BP: 131/83 (08 May 2023 05:23) (126/93 - 141/85)  BP(mean): --  RR: 18 (08 May 2023 05:23) (16 - 18)  SpO2: 96% (08 May 2023 05:23) (94% - 96%)    Parameters below as of 08 May 2023 05:23  Patient On (Oxygen Delivery Method): nasal cannula  O2 Flow (L/min): 2      ________________________________________________  PHYSICAL EXAM:  Chronically ill appearing, lethargic, comfortable  GENERAL: NAD  HEENT: hoarse voice, Normocephalic;  conjunctivae and sclerae clear; moist mucous membranes;   NECK : supple  CHEST/LUNG: Clear to auscultation bilaterally with good air entry   HEART: S1 S2  regular; no murmurs, gallops or rubs  ABDOMEN: Soft, Nontender, Nondistended; Bowel sounds present  EXTREMITIES: no cyanosis; no edema; no calf tenderness  SKIN: warm and dry; no rash  NERVOUS SYSTEM:  Awake and alert;  Unable to obtain due to mentation  LABS:    CAPILLARY BLOOD GLUCOSE    RADIOLOGY & ADDITIONAL TESTS:      < from: CT Chest w/ IV Cont (05.05.23 @ 13:36) >    ACC: [id number] EXAM:  CT ABDOMEN AND PELVIS OC IC   ORDERED BY: RINKU LEMONS     ACC: [id number] EXAM:  CT CHEST IC   ORDERED BY: RINKU LEMONS     PROCEDURE DATE:  05/05/2023          INTERPRETATION:  CLINICAL INFORMATION: Colon cancer with metastatic   disease    COMPARISON: MRI abdomen 10/8/2017.    CONTRAST/COMPLICATIONS:  IV Contrast: Omnipaque 350 (accession [id number]), IV contrast documented   in unlinked concurrent exam (accession [id number])  90 cc administered   10   cc discarded  Oral Contrast: NONE (accession [id number]), Gastroview (accession [id number])  Complications: None reported at time of study completion    PROCEDURE:  CT of the Chest, Abdomen and Pelvis was performed.  Sagittal and coronal reformats were performed.    FINDINGS:    CHEST:  LUNGS AND LARGE AIRWAYS: Respiratory motion limits evaluation of the   airways. Trace airway secretions. Large right pleural effusion with   adjacent lung atelectasis. Trace left pleural effusion with adjacent left   basilar atelectasis. Multiple metastatic pulmonary nodules, for reference   measuring 15 mm of the right upper lobe, image 48 series 2 and 13 mm of   the left lower lobe image 75 series 2.  PLEURA: Large right pleural effusion with adjacent compressive   atelectasis. Trace left pleural effusion with adjacent atelectasis. No   pneumothorax.  VESSELS: Port catheter terminates at the cavoatrial junction. Normal   caliber of the thoracic aorta with atheromatous changes. Main pulmonary   artery size is within normal limits.  HEART: Heart size is within normal limits. No pericardial effusion.  MEDIASTINUM AND LIBBY: Right cardiophrenic node measures 1 cm, image 113   series 2, enlarged and pathologic appearing. Additional small volume   nodes of the thorax. Esophagus is nondistended.  CHEST WALL AND LOWER NECK: Right chest wall port. Additional shunt   catheter is seen along the right anterior subcutaneous soft tissues,   terminating in the right mid abdomen. Visualized thyroid is unremarkable.    ABDOMEN AND PELVIS:  LIVER: Hepatic metastatic disease most prominent of the right hepatic   lobe. For reference, confluent right hepatic tumor measures 9.4 cm in   transverse dimension, image 135 series 2, with suggested capsular   disruption on image 147 series 2. Subcentimeter left hepatic hypodense   lesions are identified, too small to characterize. Additional metastatic   disease cannot be excluded. Peritoneal disease results in hepatic   capsular implants, for example along the medial left hepatic lobe, image  134 series 2. Main portal vein is patent.  BILE DUCTS: No distention  GALLBLADDER: Likely surgically absent  SPLEEN: Enlarged, 14 cm craniocaudal dimension. Capsular implants   secondary to peritoneal disease, for example image 109 series 2.   Additional small hypodensities of the spleen are suboptimally   characterized.  PANCREAS: No acute peripancreatic inflammation. 1 cm cystic lesion of the   pancreatic body similar to prior MRI from 2017. 13 mm pancreatic tail   hypodense lesion, image 141series 2 previously measured 17 mm in 2017.  ADRENALS: Nodular thickening of the right adrenal is new since 2017.   Slightly thickened left adrenal as well.  KIDNEYS/URETERS: No hydronephrosis. Subcentimeter hypodensities too small   to characterize.    BLADDER: Minimally distended.  REPRODUCTIVE ORGANS: Enlarged prostate    BOWEL: Stomach is underdistended. No small bowel distention. No secondary   signs of acute appendicitis. Mild stool burden of the colon limits   evaluation of the mucosa. Rectosigmoid anastomosis.  PERITONEUM: Moderate ascites. Extensive peritoneal/omental   carcinomatosis. For reference, right-sided peritoneal mass lesion   measures 7.6 x 2.6 cm, image 156 series 2. Carcinomatosis also results in   capsular implants along the liver and spleen. Serosal disease of the   bowel is also likely present for example at the colonic hepatic flexure   on image 148 series 2. Shunt catheter terminates along the right mid   abdomen.  VESSELS: No abdominal aortic aneurysm. Atheromatous changes.   Redemonstrated proximal/mid SMA dissection, with aneurysmal dilatation up   to 16 mm, similar to prior imaging from 2017 given differences in MRI and   CT technique.  RETROPERITONEUM/LYMPH NODES: Few abnormal lymph nodes, for example at   right cardiophrenic station measuring 1 cm, image 113 series 2.   Periceliac node measures 12 mm, image 137 series 2.  ABDOMINAL WALL: Postsurgical changes.  BONES: Degenerative changes. Spinal scoliotic curvature. L5-S1 fusion   hardware with associated laminectomy. L1 vertebral body with bilateral   13th ribs. Few sclerotic foci of the bones are indeterminate. For   reference, 7 mm left iliac wing sclerotic lesion, image 237 series 2.    IMPRESSION:    Extensive metastatic neoplasm of the lungs and liver in this patient with   history of colon cancer. Extensive peritoneal/omental carcinomatosis and   moderate ascites. Capsular disease of the liver and spleen. Likely   serosal disease along the colon. Few pathologic lymph nodes identified.   Nodular thickening of the right adrenal gland, possibly related to   neoplastic disease as well.    Large right pleural effusion with adjacent compressive atelectasis of the   right lung.    Few sclerotic foci of the bones are indeterminate. For reference, 7 mm   left iliac wing sclerotic lesion, image 237 series 2. Correlation with   bone scan is recommended.    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    < end of copied text >      < from: Xray Chest 1 View AP/PA (05.01.23 @ 16:48) >    ACC: [id number] EXAM:  XR CHEST AP OR PA 1V   ORDERED BY: TRISTA DUPONT     PROCEDURE DATE:  05/01/2023          INTERPRETATION:  INDICATION: Fall    COMPARISON: 1/20/2021    FINDINGS:  An AP semiupright view of the chest demonstrates a right-sidedVP shunt,   unchanged. There is a right-sided infusion catheter terminating in the   right atrium without pneumothorax. There is a shallow inspiratory effort   and while no focal infiltrates are seen and there is no pneumothorax,   there is a new right pleural effusion. There is no pleural effusion on   the left. There is no hilar or mediastinal widening. The cardiac   silhouette may be magnified by technique. There is no pulmonary edema.   The bony thorax remains unchanged.    IMPRESSION:  1. There is a new right pleural effusion with no evidence of pneumonia or   CHF.  2. There is a new right sided infusion catheter, terminating in the right   atrium, without pneumothorax.  3. Right-sided  shunt, unchanged.    < end of copied text >    < from: Xray Pelvis AP only (05.01.23 @ 16:47) >    ACC: [id number] EXAM:  XR PELVIS AP ONLY 1-2 VIEWS   ORDERED BY: TRISTA DUPONT     PROCEDURE DATE:  05/01/2023          INTERPRETATION:  An AP view of the pelvis was performed to evaluate for   fracture.    Comparison is made to 4/7/2023.    There is no evidence of acute fracture or dislocation. There is no   diastases of the SI joints or symphysis pubis. Both pubic rings are   grossly intact. There are only mild degenerative changes of both hips.   Calcifications adjacent to the peripheral acetabulum on both sides could   represent accessory ossicles or related to an old remote labral injury.   The patient is undergone lower lumbar midline laminectomy with surgical   fusion, including metallic hardware and apparatus inserting on both sides   at L4-L5. There are surgical clips within the left para midline pelvis.    IMPRESSION:  1. No acute fracture or dislocation is seen and there is no significant   change from the prior exam.  2. Postoperative findings as outlined above, remain stable.  3. Mild degenerative changes of both hips with calcifications adjacent to   the acetabular roof on both sides, consistent with either accessory   ossicles or old remote labral injury.    < end of copied text >      Imaging Personally Reviewed:  YES/NO    Consultant(s) Notes Reviewed:   YES/ No    Care Discussed with Consultants :     Plan of care was discussed with patient and /or primary care giver; all questions and concerns were addressed and care was aligned with patient's wishes.

## 2023-05-08 NOTE — PROGRESS NOTE ADULT - PROBLEM SELECTOR PLAN 6
77 year old male, from home hospice, with PMHx HTN, HLD, BPH, PAF (on Eliquis), now with extensive carcinoma of colon metastatic to multiple sites (s/p chemo and surgery), and NPH (w/  shunt), and advanced dementia. Patient presenting disease and symptoms' burden.    Patient is at high risk for mortality, morbidity, and infections.    [facility]  Eligible for SNF, [facility] with hospice care services    Code Status: DNR, DNI    Palliative Care will follow.

## 2023-05-08 NOTE — DIETITIAN INITIAL EVALUATION ADULT - PERTINENT MEDS FT
MEDICATIONS  (STANDING):  amLODIPine   Tablet 5 milliGRAM(s) Oral daily  apixaban 5 milliGRAM(s) Oral every 12 hours  atorvastatin 20 milliGRAM(s) Oral at bedtime  cholecalciferol 5000 Unit(s) Oral daily  cyanocobalamin 1000 MICROGram(s) Oral daily  enalapril 20 milliGRAM(s) Oral every 12 hours  finasteride 5 milliGRAM(s) Oral daily  lidocaine   4% Patch 1 Patch Transdermal daily  metoprolol succinate ER 50 milliGRAM(s) Oral daily  naloxone Injectable 0.4 milliGRAM(s) IV Push once  polyethylene glycol 3350 17 Gram(s) Oral daily  senna 2 Tablet(s) Oral at bedtime  sertraline 100 milliGRAM(s) Oral daily    MEDICATIONS  (PRN):  acetaminophen     Tablet .. 650 milliGRAM(s) Oral every 6 hours PRN Temp greater or equal to 38C (100.4F), Mild Pain (1 - 3)  aluminum hydroxide/magnesium hydroxide/simethicone Suspension 30 milliLiter(s) Oral every 4 hours PRN Dyspepsia  bisacodyl 5 milliGRAM(s) Oral daily PRN Constipation  LORazepam     Tablet 0.5 milliGRAM(s) Oral every 6 hours PRN Anxiety  melatonin 3 milliGRAM(s) Oral at bedtime PRN Insomnia  ondansetron Injectable 4 milliGRAM(s) IV Push every 8 hours PRN Nausea and/or Vomiting  oxyCODONE    IR 2.5 milliGRAM(s) Oral every 4 hours PRN Moderate Pain (4 - 6)  oxyCODONE    IR 5 milliGRAM(s) Oral every 4 hours PRN Severe Pain (7 - 10)

## 2023-05-08 NOTE — PROGRESS NOTE ADULT - SUBJECTIVE AND OBJECTIVE BOX
follow up on:  complex medical decision making related to goals of care    [facility] Geriatric and Palliative Consult Service:  Dr. Lesli Moreno: cell ([phone])  Dr. Erlin García: cell ([phone])  Laura Dangelo DNP: cell ([phone])  Anrdiy Mata NP: cell ([phone])   Mireya Lyon NP: YARELI Riley LMSW: cell ([phone])     OVERNIGHT EVENTS:    Present Symptoms:   Pain: yes, "all over"  Fatigue: yes  Nausea:  Lack of Appetite:   SOB:  Depression:  Anxiety:    Review of Systems: [All others negative]    MEDICATIONS  (STANDING):  amLODIPine   Tablet 5 milliGRAM(s) Oral daily  apixaban 5 milliGRAM(s) Oral every 12 hours  atorvastatin 20 milliGRAM(s) Oral at bedtime  cholecalciferol 5000 Unit(s) Oral daily  cyanocobalamin 1000 MICROGram(s) Oral daily  enalapril 20 milliGRAM(s) Oral every 12 hours  finasteride 5 milliGRAM(s) Oral daily  lidocaine   4% Patch 1 Patch Transdermal daily  metoprolol succinate ER 50 milliGRAM(s) Oral daily  naloxone Injectable 0.4 milliGRAM(s) IV Push once  polyethylene glycol 3350 17 Gram(s) Oral daily  senna 2 Tablet(s) Oral at bedtime  sertraline 100 milliGRAM(s) Oral daily    MEDICATIONS  (PRN):  acetaminophen     Tablet .. 650 milliGRAM(s) Oral every 6 hours PRN Temp greater or equal to 38C (100.4F), Mild Pain (1 - 3)  aluminum hydroxide/magnesium hydroxide/simethicone Suspension 30 milliLiter(s) Oral every 4 hours PRN Dyspepsia  bisacodyl 5 milliGRAM(s) Oral daily PRN Constipation  LORazepam     Tablet 0.5 milliGRAM(s) Oral every 6 hours PRN Anxiety  melatonin 3 milliGRAM(s) Oral at bedtime PRN Insomnia  ondansetron Injectable 4 milliGRAM(s) IV Push every 8 hours PRN Nausea and/or Vomiting  oxyCODONE    IR 2.5 milliGRAM(s) Oral every 4 hours PRN Moderate Pain (4 - 6)  oxyCODONE    IR 5 milliGRAM(s) Oral every 4 hours PRN Severe Pain (7 - 10)      PHYSICAL EXAM:  Vital Signs Last 24 Hrs  T(C): 36.6 (08 May 2023 05:23), Max: 37.5 (07 May 2023 21:05)  T(F): 97.9 (08 May 2023 05:23), Max: 99.5 (07 May 2023 21:05)  HR: 94 (08 May 2023 05:23) (93 - 99)  BP: 131/83 (08 May 2023 05:23) (126/93 - 141/85)  BP(mean): --  RR: 18 (08 May 2023 05:23) (16 - 18)  SpO2: 96% (08 May 2023 05:23) (94% - 96%)    Parameters below as of 08 May 2023 05:23  Patient On (Oxygen Delivery Method): nasal cannula  O2 Flow (L/min): 2    General: appears chronically ill, in NAD but answered "yes" for pain all over abdomen. See above    Palliative Performance Scale/Karnofsky Score: 30%  http://npcrc.org/files/news/palliative_performance_scale_ppsv2.pdf    HEENT: EOMI, pale, dry mouth   Lungs: decreased bases, left anterior upper chest rhonchi/congetion, labored on NC  CV: RRR, S1S2 present, no LE edema  GI: soft, distended, tender, incontinent  : incontinent    Musculoskeletal: weakness x4, bedbound  Skin: no abnormal skin lesions, poor skin turgor  Neuro: A&O x1, pleasantly confused, minimally verbal  Oral intake ability: moderate oral intake with assistance    LABS:                RADIOLOGY & ADDITIONAL STUDIES: follow up on:  complex medical decision making related to goals of care    [facility] Geriatric and Palliative Consult Service:  Dr. Lesli Moreno: cell [phone])  Dr. Erlin García: cell [phone])  Laura Dangelo DNP: cell ([phone])  Andriy Mata NP: cell ([phone])   Mireya Lyon NP: YARELI Riley LMSW: cell ([phone])     OVERNIGHT EVENTS:    Present Symptoms:   Pain: yes, "all over"  Fatigue: yes  Nausea:  Lack of Appetite:   SOB:  Depression:  Anxiety:    Review of Systems: [All others negative]    MEDICATIONS  (STANDING):  amLODIPine   Tablet 5 milliGRAM(s) Oral daily  apixaban 5 milliGRAM(s) Oral every 12 hours  atorvastatin 20 milliGRAM(s) Oral at bedtime  cholecalciferol 5000 Unit(s) Oral daily  cyanocobalamin 1000 MICROGram(s) Oral daily  enalapril 20 milliGRAM(s) Oral every 12 hours  finasteride 5 milliGRAM(s) Oral daily  lidocaine   4% Patch 1 Patch Transdermal daily  metoprolol succinate ER 50 milliGRAM(s) Oral daily  naloxone Injectable 0.4 milliGRAM(s) IV Push once  polyethylene glycol 3350 17 Gram(s) Oral daily  senna 2 Tablet(s) Oral at bedtime  sertraline 100 milliGRAM(s) Oral daily    MEDICATIONS  (PRN):  acetaminophen     Tablet .. 650 milliGRAM(s) Oral every 6 hours PRN Temp greater or equal to 38C (100.4F), Mild Pain (1 - 3)  aluminum hydroxide/magnesium hydroxide/simethicone Suspension 30 milliLiter(s) Oral every 4 hours PRN Dyspepsia  bisacodyl 5 milliGRAM(s) Oral daily PRN Constipation  LORazepam     Tablet 0.5 milliGRAM(s) Oral every 6 hours PRN Anxiety  melatonin 3 milliGRAM(s) Oral at bedtime PRN Insomnia  ondansetron Injectable 4 milliGRAM(s) IV Push every 8 hours PRN Nausea and/or Vomiting  oxyCODONE    IR 2.5 milliGRAM(s) Oral every 4 hours PRN Moderate Pain (4 - 6)  oxyCODONE    IR 5 milliGRAM(s) Oral every 4 hours PRN Severe Pain (7 - 10)      PHYSICAL EXAM:  Vital Signs Last 24 Hrs  T(C): 36.6 (08 May 2023 05:23), Max: 37.5 (07 May 2023 21:05)  T(F): 97.9 (08 May 2023 05:23), Max: 99.5 (07 May 2023 21:05)  HR: 94 (08 May 2023 05:23) (93 - 99)  BP: 131/83 (08 May 2023 05:23) (126/93 - 141/85)  BP(mean): --  RR: 18 (08 May 2023 05:23) (16 - 18)  SpO2: 96% (08 May 2023 05:23) (94% - 96%)    Parameters below as of 08 May 2023 05:23  Patient On (Oxygen Delivery Method): nasal cannula  O2 Flow (L/min): 2    General: appears chronically ill, in NAD but answered "yes" for pain all over abdomen. See above    Palliative Performance Scale/Karnofsky Score: 30%  http://npcrc.org/files/news/palliative_performance_scale_ppsv2.pdf    HEENT: EOMI, pale, dry mouth   Lungs: decreased beath sound  on  bases, mild labored breathing on NC  CV: RRR, S1S2 present, no LE edema  GI: soft, distended, tender, incontinent  : incontinent    Musculoskeletal: weakness x4, bedbound  Skin: no abnormal skin lesions, poor skin turgor  Neuro: A&O x1, pleasantly confused, minimally verbal  Oral intake ability: moderate oral intake with assistance    LABS:                RADIOLOGY & ADDITIONAL STUDIES: follow up on:  complex medical decision making related to goals of care    [facility] Geriatric and Palliative Consult Service:  Dr. Lesli Moreno: cell ([phone])  Dr. Erlin García: cell ([phone])  Laura Dangelo DNP: cell [phone])  Andriy Mata NP: cell ([phone])   Mireya Lyon NP: TEAMS  Trev Riley LMSW: cell ([phone])     INTERIM HX:  Assessed patient and met his wife and HHA at bedside. Wife was very tearful and stated that patient was a lee man and a great . Emotional support provided.      OVERNIGHT EVENTS: No acute overnight events    Present Symptoms:   Pain: yes, and touched abdominal area. Unable to quantify and describe the pain. Presented grimacing.   Fatigue:  Nausea: No  Lack of Appetite: yes  SOB:  Depression: unable to assess  Anxiety:  unable to assess    Review of Systems: denies headache, dizziness, chest pain, and right lower extremity pain.     MEDICATIONS  (STANDING):  amLODIPine   Tablet 5 milliGRAM(s) Oral daily  apixaban 5 milliGRAM(s) Oral every 12 hours  atorvastatin 20 milliGRAM(s) Oral at bedtime  cholecalciferol 5000 Unit(s) Oral daily  cyanocobalamin 1000 MICROGram(s) Oral daily  enalapril 20 milliGRAM(s) Oral every 12 hours  finasteride 5 milliGRAM(s) Oral daily  lidocaine   4% Patch 1 Patch Transdermal daily  metoprolol succinate ER 50 milliGRAM(s) Oral daily  naloxone Injectable 0.4 milliGRAM(s) IV Push once  polyethylene glycol 3350 17 Gram(s) Oral daily  senna 2 Tablet(s) Oral at bedtime  sertraline 100 milliGRAM(s) Oral daily    MEDICATIONS  (PRN):  acetaminophen     Tablet .. 650 milliGRAM(s) Oral every 6 hours PRN Temp greater or equal to 38C (100.4F), Mild Pain (1 - 3)  aluminum hydroxide/magnesium hydroxide/simethicone Suspension 30 milliLiter(s) Oral every 4 hours PRN Dyspepsia  bisacodyl 5 milliGRAM(s) Oral daily PRN Constipation  LORazepam     Tablet 0.5 milliGRAM(s) Oral every 6 hours PRN Anxiety  melatonin 3 milliGRAM(s) Oral at bedtime PRN Insomnia  ondansetron Injectable 4 milliGRAM(s) IV Push every 8 hours PRN Nausea and/or Vomiting  oxyCODONE    IR 2.5 milliGRAM(s) Oral every 4 hours PRN Moderate Pain (4 - 6)  oxyCODONE    IR 5 milliGRAM(s) Oral every 4 hours PRN Severe Pain (7 - 10)      PHYSICAL EXAM:  Vital Signs Last 24 Hrs  T(C): 36.6 (08 May 2023 05:23), Max: 37.5 (07 May 2023 21:05)  T(F): 97.9 (08 May 2023 05:23), Max: 99.5 (07 May 2023 21:05)  HR: 94 (08 May 2023 05:23) (93 - 99)  BP: 131/83 (08 May 2023 05:23) (126/93 - 141/85)  BP(mean): --  RR: 18 (08 May 2023 05:23) (16 - 18)  SpO2: 96% (08 May 2023 05:23) (94% - 96%)    Parameters below as of 08 May 2023 05:23  Patient On (Oxygen Delivery Method): nasal cannula  O2 Flow (L/min): 2    General: appears chronically ill, in NAD but answered "yes" for pain all over abdomen. See above    Palliative Performance Scale/Karnofsky Score: 30%  http://npcrc.org/files/news/palliative_performance_scale_ppsv2.pdf    HEENT: EOMI, pale, dry mouth   Lungs: decreased beath sound on bases, mild labored breathing on NC  CV: RRR, S1S2 present, no LE edema  GI: soft, distended, tender, incontinent  : incontinent    Musculoskeletal: weakness x4, bedbound  Skin: no abnormal skin lesions, poor skin turgor  Neuro: A&O x1, pleasantly confused, minimally verbal  Oral intake ability: moderate oral intake with assistance    LABS:  No new labs        RADIOLOGY & ADDITIONAL STUDIES:    < from: CT Chest w/ IV Cont (05.05.23 @ 13:36) >  ACC: [id number] EXAM:  CT ABDOMEN AND PELVIS OC IC   ORDERED BY: RINKU LEMONS     ACC: [id number] EXAM:  CT CHEST IC   ORDERED BY: RINKU LEMONS     PROCEDURE DATE:  05/05/2023      INTERPRETATION:  CLINICAL INFORMATION: Colon cancer with metastatic disease    COMPARISON: MRI abdomen 10/8/2017.    IMPRESSION:    Extensive metastatic neoplasm of the lungs and liver in this patient with history of colon cancer. Extensive peritoneal/omental carcinomatosis and moderate ascites. Capsular disease of the liver and spleen. Likely serosal disease along the colon. Few pathologic lymph nodes identified. Nodular thickening of the right adrenal gland, possibly related to neoplastic disease as well.    Large right pleural effusion with adjacent compressive atelectasis of the right lung.    Few sclerotic foci of the bones are indeterminate. For reference, 7 mm left iliac wing sclerotic lesion, image 237 series 2. Correlation with bone scan is recommended.    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    --- End of Report ---    VEDA SANTILLAN M.D., ATTENDING RADIOLOGIST  This document has been electronically signed. May  5 2023  4:35PM

## 2023-05-08 NOTE — DIETITIAN INITIAL EVALUATION ADULT - OTHER INFO
reports weight loss for patient but unsure of amount. ordered for a Regular diet and tolerating it. declined oral supplements . Has No food allergies. patient diagnosed for severe PCM . agree with diagnosis .

## 2023-05-09 NOTE — PROGRESS NOTE ADULT - SUBJECTIVE AND OBJECTIVE BOX
NP Note discussed with  Primary Attending    Patient is a 77y old  Male who presents with a chief complaint of Intractable pain (08 May 2023 13:10)      INTERVAL HPI/OVERNIGHT EVENTS: no new complaints    MEDICATIONS  (STANDING):  amLODIPine   Tablet 5 milliGRAM(s) Oral daily  apixaban 5 milliGRAM(s) Oral every 12 hours  atorvastatin 20 milliGRAM(s) Oral at bedtime  cholecalciferol 5000 Unit(s) Oral daily  cyanocobalamin 1000 MICROGram(s) Oral daily  enalapril 20 milliGRAM(s) Oral every 12 hours  finasteride 5 milliGRAM(s) Oral daily  lidocaine   4% Patch 1 Patch Transdermal daily  metoprolol succinate ER 50 milliGRAM(s) Oral daily  naloxone Injectable 0.4 milliGRAM(s) IV Push once  polyethylene glycol 3350 17 Gram(s) Oral daily  senna 2 Tablet(s) Oral at bedtime  sertraline 100 milliGRAM(s) Oral daily    MEDICATIONS  (PRN):  acetaminophen     Tablet .. 650 milliGRAM(s) Oral every 6 hours PRN Temp greater or equal to 38C (100.4F), Mild Pain (1 - 3)  aluminum hydroxide/magnesium hydroxide/simethicone Suspension 30 milliLiter(s) Oral every 4 hours PRN Dyspepsia  bisacodyl 5 milliGRAM(s) Oral daily PRN Constipation  LORazepam     Tablet 0.5 milliGRAM(s) Oral every 6 hours PRN Anxiety  melatonin 3 milliGRAM(s) Oral at bedtime PRN Insomnia  ondansetron Injectable 4 milliGRAM(s) IV Push every 8 hours PRN Nausea and/or Vomiting  oxyCODONE    IR 5 milliGRAM(s) Oral every 4 hours PRN Severe Pain (7 - 10)  oxyCODONE    IR 2.5 milliGRAM(s) Oral every 4 hours PRN Moderate Pain (4 - 6)      __________________________________________________  REVIEW OF SYSTEMS:  Unable to obtain due to mentation      Vital Signs Last 24 Hrs  T(C): 37.2 (09 May 2023 04:43), Max: 37.5 (08 May 2023 17:00)  T(F): 99 (09 May 2023 04:43), Max: 99.5 (08 May 2023 17:00)  HR: 90 (09 May 2023 04:43) (90 - 96)  BP: 142/90 (09 May 2023 04:43) (140/92 - 142/90)  BP(mean): --  RR: 18 (09 May 2023 04:43) (17 - 18)  SpO2: 95% (09 May 2023 04:43) (95% - 96%)    Parameters below as of 09 May 2023 04:43  Patient On (Oxygen Delivery Method): nasal cannula  O2 Flow (L/min): 2      ________________________________________________  PHYSICAL EXAM:  Chronically ill appearing, comfortable  GENERAL: NAD  HEENT: Normocephalic;  conjunctivae and sclerae clear; moist mucous membranes;   NECK : supple  CHEST/LUNG: Clear to auscultation bilaterally with good air entry   HEART: S1 S2  regular; no murmurs, gallops or rubs  ABDOMEN: Soft, Nontender, Nondistended; Bowel sounds present  EXTREMITIES: no cyanosis; no edema; no calf tenderness  SKIN: warm and dry; no rash  NERVOUS SYSTEM:  Awake and alert; Confused    _________________________________________________  LABS:      CAPILLARY BLOOD GLUCOSE    RADIOLOGY & ADDITIONAL TESTS:    < from: CT Chest w/ IV Cont (05.05.23 @ 13:36) >    ACC: [id number] EXAM:  CT ABDOMEN AND PELVIS OC IC   ORDERED BY: RINKU LEMONS     ACC: [id number] EXAM:  CT CHEST IC   ORDERED BY: RINKU LEMONS     PROCEDURE DATE:  05/05/2023          INTERPRETATION:  CLINICAL INFORMATION: Colon cancer with metastatic   disease    COMPARISON: MRI abdomen 10/8/2017.    CONTRAST/COMPLICATIONS:  IV Contrast: Omnipaque 350 (accession [id number]), IV contrast documented   in unlinked concurrent exam (accession [id number])  90 cc administered   10   cc discarded  Oral Contrast: NONE (accession [id number]), Gastroview (accession [id number])  Complications: None reported at time of study completion    PROCEDURE:  CT of the Chest, Abdomen and Pelvis was performed.  Sagittal and coronal reformats were performed.    FINDINGS:    CHEST:  LUNGS AND LARGE AIRWAYS: Respiratory motion limits evaluation of the   airways. Trace airway secretions. Large right pleural effusion with   adjacent lung atelectasis. Trace left pleural effusion with adjacent left   basilar atelectasis. Multiple metastatic pulmonary nodules, for reference   measuring 15 mm of the right upper lobe, image 48 series 2 and 13 mm of   the left lower lobe image 75 series 2.  PLEURA: Large right pleural effusion with adjacent compressive   atelectasis. Trace left pleural effusion with adjacent atelectasis. No   pneumothorax.  VESSELS: Port catheter terminates at the cavoatrial junction. Normal   caliber of the thoracic aorta with atheromatous changes. Main pulmonary   artery size is within normal limits.  HEART: Heart size is within normal limits. No pericardial effusion.  MEDIASTINUM AND LIBBY: Right cardiophrenic node measures 1 cm, image 113   series 2, enlarged and pathologic appearing. Additional small volume   nodes of the thorax. Esophagus is nondistended.  CHEST WALL AND LOWER NECK: Right chest wall port. Additional shunt   catheter is seen along the right anterior subcutaneous soft tissues,   terminating in the right mid abdomen. Visualized thyroid is unremarkable.    ABDOMEN AND PELVIS:  LIVER: Hepatic metastatic disease most prominent of the right hepatic   lobe. For reference, confluent right hepatic tumor measures 9.4 cm in   transverse dimension, image 135 series 2, with suggested capsular   disruption on image 147 series 2. Subcentimeter left hepatic hypodense   lesions are identified, too small to characterize. Additional metastatic   disease cannot be excluded. Peritoneal disease results in hepatic   capsular implants, for example along the medial left hepatic lobe, image  134 series 2. Main portal vein is patent.  BILE DUCTS: No distention  GALLBLADDER: Likely surgically absent  SPLEEN: Enlarged, 14 cm craniocaudal dimension. Capsular implants   secondary to peritoneal disease, for example image 109 series 2.   Additional small hypodensities of the spleen are suboptimally   characterized.  PANCREAS: No acute peripancreatic inflammation. 1 cm cystic lesion of the   pancreatic body similar to prior MRI from 2017. 13 mm pancreatic tail   hypodense lesion, image 141series 2 previously measured 17 mm in 2017.  ADRENALS: Nodular thickening of the right adrenal is new since 2017.   Slightly thickened left adrenal as well.  KIDNEYS/URETERS: No hydronephrosis. Subcentimeter hypodensities too small   to characterize.    BLADDER: Minimally distended.  REPRODUCTIVE ORGANS: Enlarged prostate    BOWEL: Stomach is underdistended. No small bowel distention. No secondary   signs of acute appendicitis. Mild stool burden of the colon limits   evaluation of the mucosa. Rectosigmoid anastomosis.  PERITONEUM: Moderate ascites. Extensive peritoneal/omental   carcinomatosis. For reference, right-sided peritoneal mass lesion   measures 7.6 x 2.6 cm, image 156 series 2. Carcinomatosis also results in   capsular implants along the liver and spleen. Serosal disease of the   bowel is also likely present for example at the colonic hepatic flexure   on image 148 series 2. Shunt catheter terminates along the right mid   abdomen.  VESSELS: No abdominal aortic aneurysm. Atheromatous changes.   Redemonstrated proximal/mid SMA dissection, with aneurysmal dilatation up   to 16 mm, similar to prior imaging from 2017 given differences in MRI and   CT technique.  RETROPERITONEUM/LYMPH NODES: Few abnormal lymph nodes, for example at   right cardiophrenic station measuring 1 cm, image 113 series 2.   Periceliac node measures 12 mm, image 137 series 2.  ABDOMINAL WALL: Postsurgical changes.  BONES: Degenerative changes. Spinal scoliotic curvature. L5-S1 fusion   hardware with associated laminectomy. L1 vertebral body with bilateral   13th ribs. Few sclerotic foci of the bones are indeterminate. For   reference, 7 mm left iliac wing sclerotic lesion, image 237 series 2.    IMPRESSION:    Extensive metastatic neoplasm of the lungs and liver in this patient with   history of colon cancer. Extensive peritoneal/omental carcinomatosis and   moderate ascites. Capsular disease of the liver and spleen. Likely   serosal disease along the colon. Few pathologic lymph nodes identified.   Nodular thickening of the right adrenal gland, possibly related to   neoplastic disease as well.    Large right pleural effusion with adjacent compressive atelectasis of the   right lung.    Few sclerotic foci of the bones are indeterminate. For reference, 7 mm   left iliac wing sclerotic lesion, image 237 series 2. Correlation with   bone scan is recommended.    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    < end of copied text >      < from: Xray Chest 1 View AP/PA (05.01.23 @ 16:48) >    ACC: [id number] EXAM:  XR CHEST AP OR PA 1V   ORDERED BY: TRISTA DUPONT     PROCEDURE DATE:  05/01/2023          INTERPRETATION:  INDICATION: Fall    COMPARISON: 1/20/2021    FINDINGS:  An AP semiupright view of the chest demonstrates a right-sidedVP shunt,   unchanged. There is a right-sided infusion catheter terminating in the   right atrium without pneumothorax. There is a shallow inspiratory effort   and while no focal infiltrates are seen and there is no pneumothorax,   there is a new right pleural effusion. There is no pleural effusion on   the left. There is no hilar or mediastinal widening. The cardiac   silhouette may be magnified by technique. There is no pulmonary edema.   The bony thorax remains unchanged.    IMPRESSION:  1. There is a new right pleural effusion with no evidence of pneumonia or   CHF.  2. There is a new right sided infusion catheter, terminating in the right   atrium, without pneumothorax.  3. Right-sided  shunt, unchanged.    --- End of Report ---      < end of copied text >    Imaging Personally Reviewed:  YES/NO    Consultant(s) Notes Reviewed:   YES/ No    Care Discussed with Consultants :     Plan of care was discussed with patient and /or primary care giver; all questions and concerns were addressed and care was aligned with patient's wishes.

## 2023-05-09 NOTE — PROGRESS NOTE ADULT - SUBJECTIVE AND OBJECTIVE BOX
Patient is a 77y old  Male who presents with a chief complaint of Intractable pain (08 May 2023 13:10)      INTERVAL HPI/OVERNIGHT EVENTS: pt seen and examined    MEDICATIONS  (STANDING):  amLODIPine   Tablet 5 milliGRAM(s) Oral daily  apixaban 5 milliGRAM(s) Oral every 12 hours  atorvastatin 20 milliGRAM(s) Oral at bedtime  cholecalciferol 5000 Unit(s) Oral daily  cyanocobalamin 1000 MICROGram(s) Oral daily  enalapril 20 milliGRAM(s) Oral every 12 hours  finasteride 5 milliGRAM(s) Oral daily  lidocaine   4% Patch 1 Patch Transdermal daily  metoprolol succinate ER 50 milliGRAM(s) Oral daily  naloxone Injectable 0.4 milliGRAM(s) IV Push once  polyethylene glycol 3350 17 Gram(s) Oral daily  senna 2 Tablet(s) Oral at bedtime  sertraline 100 milliGRAM(s) Oral daily    MEDICATIONS  (PRN):  acetaminophen     Tablet .. 650 milliGRAM(s) Oral every 6 hours PRN Temp greater or equal to 38C (100.4F), Mild Pain (1 - 3)  aluminum hydroxide/magnesium hydroxide/simethicone Suspension 30 milliLiter(s) Oral every 4 hours PRN Dyspepsia  bisacodyl 5 milliGRAM(s) Oral daily PRN Constipation  LORazepam     Tablet 0.5 milliGRAM(s) Oral every 6 hours PRN Anxiety  melatonin 3 milliGRAM(s) Oral at bedtime PRN Insomnia  ondansetron Injectable 4 milliGRAM(s) IV Push every 8 hours PRN Nausea and/or Vomiting  oxyCODONE    IR 5 milliGRAM(s) Oral every 4 hours PRN Severe Pain (7 - 10)  oxyCODONE    IR 2.5 milliGRAM(s) Oral every 4 hours PRN Moderate Pain (4 - 6)      __________________________________________________  REVIEW OF SYSTEMS:  Unable to obtain due to mentation      Vital Signs Last 24 Hrs  T(C): 37.2 (09 May 2023 04:43), Max: 37.5 (08 May 2023 17:00)  T(F): 99 (09 May 2023 04:43), Max: 99.5 (08 May 2023 17:00)  HR: 90 (09 May 2023 04:43) (90 - 96)  BP: 142/90 (09 May 2023 04:43) (140/92 - 142/90)  BP(mean): --  RR: 18 (09 May 2023 04:43) (17 - 18)  SpO2: 95% (09 May 2023 04:43) (95% - 96%)    Parameters below as of 09 May 2023 04:43  Patient On (Oxygen Delivery Method): nasal cannula  O2 Flow (L/min): 2      ________________________________________________  PHYSICAL EXAM:  Chronically ill appearing, comfortable  GENERAL: NAD  HEENT: Normocephalic;  conjunctivae and sclerae clear; moist mucous membranes;   NECK : supple  CHEST/LUNG: dec breath sounds at bases   HEART: S1 S2  regular; no murmurs, gallops or rubs  ABDOMEN: Soft, Nontender, Nondistended; Bowel sounds present  EXTREMITIES: no cyanosis; no edema; no calf tenderness  SKIN: warm and dry; no rash  NERVOUS SYSTEM:  Awake and alert; Confused    _________________________________________________  LABS:      CAPILLARY BLOOD GLUCOSE    RADIOLOGY & ADDITIONAL TESTS:    < from: CT Chest w/ IV Cont (05.05.23 @ 13:36) >    ACC: [id number] EXAM:  CT ABDOMEN AND PELVIS OC IC   ORDERED BY: RINKU LEMONS     ACC: [id number] EXAM:  CT CHEST IC   ORDERED BY: RINKU LEMONS     PROCEDURE DATE:  05/05/2023          INTERPRETATION:  CLINICAL INFORMATION: Colon cancer with metastatic   disease    COMPARISON: MRI abdomen 10/8/2017.    CONTRAST/COMPLICATIONS:  IV Contrast: Omnipaque 350 (accession [id number]), IV contrast documented   in unlinked concurrent exam (accession [id number])  90 cc administered   10   cc discarded  Oral Contrast: NONE (accession [id number]), Gastroview (accession [id number])  Complications: None reported at time of study completion    PROCEDURE:  CT of the Chest, Abdomen and Pelvis was performed.  Sagittal and coronal reformats were performed.    FINDINGS:    CHEST:  LUNGS AND LARGE AIRWAYS: Respiratory motion limits evaluation of the   airways. Trace airway secretions. Large right pleural effusion with   adjacent lung atelectasis. Trace left pleural effusion with adjacent left   basilar atelectasis. Multiple metastatic pulmonary nodules, for reference   measuring 15 mm of the right upper lobe, image 48 series 2 and 13 mm of   the left lower lobe image 75 series 2.  PLEURA: Large right pleural effusion with adjacent compressive   atelectasis. Trace left pleural effusion with adjacent atelectasis. No   pneumothorax.  VESSELS: Port catheter terminates at the cavoatrial junction. Normal   caliber of the thoracic aorta with atheromatous changes. Main pulmonary   artery size is within normal limits.  HEART: Heart size is within normal limits. No pericardial effusion.  MEDIASTINUM AND LIBBY: Right cardiophrenic node measures 1 cm, image 113   series 2, enlarged and pathologic appearing. Additional small volume   nodes of the thorax. Esophagus is nondistended.  CHEST WALL AND LOWER NECK: Right chest wall port. Additional shunt   catheter is seen along the right anterior subcutaneous soft tissues,   terminating in the right mid abdomen. Visualized thyroid is unremarkable.    ABDOMEN AND PELVIS:  LIVER: Hepatic metastatic disease most prominent of the right hepatic   lobe. For reference, confluent right hepatic tumor measures 9.4 cm in   transverse dimension, image 135 series 2, with suggested capsular   disruption on image 147 series 2. Subcentimeter left hepatic hypodense   lesions are identified, too small to characterize. Additional metastatic   disease cannot be excluded. Peritoneal disease results in hepatic   capsular implants, for example along the medial left hepatic lobe, image  134 series 2. Main portal vein is patent.  BILE DUCTS: No distention  GALLBLADDER: Likely surgically absent  SPLEEN: Enlarged, 14 cm craniocaudal dimension. Capsular implants   secondary to peritoneal disease, for example image 109 series 2.   Additional small hypodensities of the spleen are suboptimally   characterized.  PANCREAS: No acute peripancreatic inflammation. 1 cm cystic lesion of the   pancreatic body similar to prior MRI from 2017. 13 mm pancreatic tail   hypodense lesion, image 141series 2 previously measured 17 mm in 2017.  ADRENALS: Nodular thickening of the right adrenal is new since 2017.   Slightly thickened left adrenal as well.  KIDNEYS/URETERS: No hydronephrosis. Subcentimeter hypodensities too small   to characterize.    BLADDER: Minimally distended.  REPRODUCTIVE ORGANS: Enlarged prostate    BOWEL: Stomach is underdistended. No small bowel distention. No secondary   signs of acute appendicitis. Mild stool burden of the colon limits   evaluation of the mucosa. Rectosigmoid anastomosis.  PERITONEUM: Moderate ascites. Extensive peritoneal/omental   carcinomatosis. For reference, right-sided peritoneal mass lesion   measures 7.6 x 2.6 cm, image 156 series 2. Carcinomatosis also results in   capsular implants along the liver and spleen. Serosal disease of the   bowel is also likely present for example at the colonic hepatic flexure   on image 148 series 2. Shunt catheter terminates along the right mid   abdomen.  VESSELS: No abdominal aortic aneurysm. Atheromatous changes.   Redemonstrated proximal/mid SMA dissection, with aneurysmal dilatation up   to 16 mm, similar to prior imaging from 2017 given differences in MRI and   CT technique.  RETROPERITONEUM/LYMPH NODES: Few abnormal lymph nodes, for example at   right cardiophrenic station measuring 1 cm, image 113 series 2.   Periceliac node measures 12 mm, image 137 series 2.  ABDOMINAL WALL: Postsurgical changes.  BONES: Degenerative changes. Spinal scoliotic curvature. L5-S1 fusion   hardware with associated laminectomy. L1 vertebral body with bilateral   13th ribs. Few sclerotic foci of the bones are indeterminate. For   reference, 7 mm left iliac wing sclerotic lesion, image 237 series 2.    IMPRESSION:    Extensive metastatic neoplasm of the lungs and liver in this patient with   history of colon cancer. Extensive peritoneal/omental carcinomatosis and   moderate ascites. Capsular disease of the liver and spleen. Likely   serosal disease along the colon. Few pathologic lymph nodes identified.   Nodular thickening of the right adrenal gland, possibly related to   neoplastic disease as well.    Large right pleural effusion with adjacent compressive atelectasis of the   right lung.    Few sclerotic foci of the bones are indeterminate. For reference, 7 mm   left iliac wing sclerotic lesion, image 237 series 2. Correlation with   bone scan is recommended.    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    < end of copied text >      < from: Xray Chest 1 View AP/PA (05.01.23 @ 16:48) >    ACC: [id number] EXAM:  XR CHEST AP OR PA 1V   ORDERED BY: TRISTA DUPONT     PROCEDURE DATE:  05/01/2023          INTERPRETATION:  INDICATION: Fall    COMPARISON: 1/20/2021    FINDINGS:  An AP semiupright view of the chest demonstrates a right-sidedVP shunt,   unchanged. There is a right-sided infusion catheter terminating in the   right atrium without pneumothorax. There is a shallow inspiratory effort   and while no focal infiltrates are seen and there is no pneumothorax,   there is a new right pleural effusion. There is no pleural effusion on   the left. There is no hilar or mediastinal widening. The cardiac   silhouette may be magnified by technique. There is no pulmonary edema.   The bony thorax remains unchanged.    IMPRESSION:  1. There is a new right pleural effusion with no evidence of pneumonia or   CHF.  2. There is a new right sided infusion catheter, terminating in the right   atrium, without pneumothorax.  3. Right-sided  shunt, unchanged.    --- End of Report ---      < end of copied text >    Imaging Personally Reviewed:  YES/NO    Consultant(s) Notes Reviewed:   YES/ No    Care Discussed with Consultants :     Plan of care was discussed with patient and /or primary care giver; all questions and concerns were addressed and care was aligned with patient's wishes.

## 2023-05-10 NOTE — PROGRESS NOTE ADULT - SUBJECTIVE AND OBJECTIVE BOX
follow up on:  complex medical decision making related to goals of care    [facility] Palliative Consult Service:  Dr. Lesli Moreno: cell ([phone])  Dr. Erlin García: cell ([phone])  Laura Dangelo DNP: cell ([phone])  Andriy Mata NP: cell ([phone])   Mireya Lyon NP: TEAMS  Trev Riley LMSW: cell ([phone])     INTERIM HX:  Assessed patient and there were no family members at bedside. He looked comfortable today      OVERNIGHT EVENTS: No acute overnight events    Present Symptoms: denied the symptoms below  Pain:   Fatigue:  Nausea:   Lack of Appetite:   SOB:  Depression:   Anxiety:     Review of Systems: denies headache, dizziness, chest pain, and right lower extremity pain.     MEDICATIONS  (STANDING):  amLODIPine   Tablet 5 milliGRAM(s) Oral daily  apixaban 5 milliGRAM(s) Oral every 12 hours  atorvastatin 20 milliGRAM(s) Oral at bedtime  cholecalciferol 5000 Unit(s) Oral daily  cyanocobalamin 1000 MICROGram(s) Oral daily  enalapril 20 milliGRAM(s) Oral every 12 hours  finasteride 5 milliGRAM(s) Oral daily  lidocaine   4% Patch 1 Patch Transdermal daily  metoprolol succinate ER 50 milliGRAM(s) Oral daily  naloxone Injectable 0.4 milliGRAM(s) IV Push once  polyethylene glycol 3350 17 Gram(s) Oral daily  senna 2 Tablet(s) Oral at bedtime  sertraline 100 milliGRAM(s) Oral daily    MEDICATIONS  (PRN):  acetaminophen     Tablet .. 650 milliGRAM(s) Oral every 6 hours PRN Temp greater or equal to 38C (100.4F), Mild Pain (1 - 3)  aluminum hydroxide/magnesium hydroxide/simethicone Suspension 30 milliLiter(s) Oral every 4 hours PRN Dyspepsia  bisacodyl 5 milliGRAM(s) Oral daily PRN Constipation  LORazepam     Tablet 0.5 milliGRAM(s) Oral every 6 hours PRN Anxiety  melatonin 3 milliGRAM(s) Oral at bedtime PRN Insomnia  ondansetron Injectable 4 milliGRAM(s) IV Push every 8 hours PRN Nausea and/or Vomiting  oxyCODONE    IR 5 milliGRAM(s) Oral every 4 hours PRN Severe Pain (7 - 10)  oxyCODONE    IR 2.5 milliGRAM(s) Oral every 4 hours PRN Moderate Pain (4 - 6)      PHYSICAL EXAM:  Vital Signs Last 24 Hrs  T(C): 38.6 (10 May 2023 14:03), Max: 38.6 (10 May 2023 14:03)  T(F): 101.5 (10 May 2023 14:03), Max: 101.5 (10 May 2023 14:03)  HR: 112 (10 May 2023 14:03) (88 - 112)  BP: 153/97 (10 May 2023 14:03) (124/85 - 154/90)  BP(mean): --  RR: 17 (10 May 2023 14:03) (17 - 18)  SpO2: 93% (10 May 2023 14:03) (92% - 95%)    Parameters below as of 10 May 2023 14:03  Patient On (Oxygen Delivery Method): nasal cannula  O2 Flow (L/min): 2    General: appears chronically ill, in NAD    Palliative Performance Scale/Karnofsky Score: 30%  http://npcrc.org/files/news/palliative_performance_scale_ppsv2.pdf    HEENT: EOMI, pale, dry mouth   Lungs: nonlabored breathing on O2 2 LPM via NC  CV: RRR, S1S2 present, no LE edema  GI: soft, distended, tender, incontinent  : incontinent    Musculoskeletal: weakness x4, bedbound  Skin: warm, dry, poor skin turgor  Neuro: A&O x1, pleasantly confused, minimally verbal  Oral intake ability: moderate oral intake with assistance        LABS:  No new labs        RADIOLOGY & ADDITIONAL STUDIES:    < from: CT Chest w/ IV Cont (05.05.23 @ 13:36) >  ACC: [id number] EXAM:  CT ABDOMEN AND PELVIS OC IC   ORDERED BY: RINKU LEMONS     ACC: [id number] EXAM:  CT CHEST IC   ORDERED BY: RINKU LEMONS     PROCEDURE DATE:  05/05/2023      INTERPRETATION:  CLINICAL INFORMATION: Colon cancer with metastatic disease    COMPARISON: MRI abdomen 10/8/2017.    IMPRESSION:    Extensive metastatic neoplasm of the lungs and liver in this patient with history of colon cancer. Extensive peritoneal/omental carcinomatosis and moderate ascites. Capsular disease of the liver and spleen. Likely serosal disease along the colon. Few pathologic lymph nodes identified. Nodular thickening of the right adrenal gland, possibly related to neoplastic disease as well.    Large right pleural effusion with adjacent compressive atelectasis of the right lung.    Few sclerotic foci of the bones are indeterminate. For reference, 7 mm left iliac wing sclerotic lesion, image 237 series 2. Correlation with bone scan is recommended.    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    --- End of Report ---    VEDA SANTILLAN M.D., ATTENDING RADIOLOGIST  This document has been electronically signed. May  5 2023  4:35PM

## 2023-05-10 NOTE — PROGRESS NOTE ADULT - PROBLEM SELECTOR PLAN 2
Comfortable today. Last Oxy IR 2.5 mg taken at 5:13 on 5/9/2023    Recommendations  -c/w Acetaminophen Tablet .. 650 mg Oral Q 6 hrs PRN for Mild Pain  -c/w Lidocaine 4% Patch 1 Patch Transdermal daily   -c/w OxyCODONE IR 2.5 mg Oral every 4 hours PRN for Moderate Pain   -c/w OxyCODONE IR 5 milliGRAM(s) Oral every 4 hours PRN for Severe Pain

## 2023-05-10 NOTE — PROGRESS NOTE ADULT - PROBLEM SELECTOR PLAN 3
without behavioral issues. Total care. Incontinent x 2. Bedbound.     FAST Score: 7C  Waiting for SNF, [facility] with hospice care services placement    -Supportive care.

## 2023-05-10 NOTE — PROGRESS NOTE ADULT - SUBJECTIVE AND OBJECTIVE BOX
NP Note discussed with  Primary Attending    Patient is a 77y old  Male who presents with a chief complaint of Intractable pain (09 May 2023 10:56)      INTERVAL HPI/OVERNIGHT EVENTS: no new complaints    MEDICATIONS  (STANDING):  amLODIPine   Tablet 5 milliGRAM(s) Oral daily  apixaban 5 milliGRAM(s) Oral every 12 hours  atorvastatin 20 milliGRAM(s) Oral at bedtime  cholecalciferol 5000 Unit(s) Oral daily  cyanocobalamin 1000 MICROGram(s) Oral daily  enalapril 20 milliGRAM(s) Oral every 12 hours  finasteride 5 milliGRAM(s) Oral daily  lidocaine   4% Patch 1 Patch Transdermal daily  metoprolol succinate ER 50 milliGRAM(s) Oral daily  naloxone Injectable 0.4 milliGRAM(s) IV Push once  polyethylene glycol 3350 17 Gram(s) Oral daily  senna 2 Tablet(s) Oral at bedtime  sertraline 100 milliGRAM(s) Oral daily    MEDICATIONS  (PRN):  acetaminophen     Tablet .. 650 milliGRAM(s) Oral every 6 hours PRN Temp greater or equal to 38C (100.4F), Mild Pain (1 - 3)  aluminum hydroxide/magnesium hydroxide/simethicone Suspension 30 milliLiter(s) Oral every 4 hours PRN Dyspepsia  bisacodyl 5 milliGRAM(s) Oral daily PRN Constipation  LORazepam     Tablet 0.5 milliGRAM(s) Oral every 6 hours PRN Anxiety  melatonin 3 milliGRAM(s) Oral at bedtime PRN Insomnia  ondansetron Injectable 4 milliGRAM(s) IV Push every 8 hours PRN Nausea and/or Vomiting  oxyCODONE    IR 2.5 milliGRAM(s) Oral every 4 hours PRN Moderate Pain (4 - 6)  oxyCODONE    IR 5 milliGRAM(s) Oral every 4 hours PRN Severe Pain (7 - 10)      __________________________________________________  REVIEW OF SYSTEMS: Unable to obtain due to mentation          Vital Signs Last 24 Hrs  T(C): 36.7 (10 May 2023 05:03), Max: 37.3 (09 May 2023 14:31)  T(F): 98.1 (10 May 2023 05:03), Max: 99.2 (09 May 2023 14:31)  HR: 95 (10 May 2023 05:03) (88 - 106)  BP: 154/90 (10 May 2023 05:03) (124/85 - 154/90)  BP(mean): --  RR: 18 (10 May 2023 05:03) (18 - 18)  SpO2: 95% (10 May 2023 05:03) (92% - 95%)    Parameters below as of 10 May 2023 05:03  Patient On (Oxygen Delivery Method): nasal cannula  O2 Flow (L/min): 2      ________________________________________________  PHYSICAL EXAM:  Chronically ill appearing, comfortable  GENERAL: NAD  HEENT: Normocephalic;  conjunctivae and sclerae clear; moist mucous membranes;   NECK : supple  CHEST/LUNG: Clear to auscultation bilaterally with good air entry   HEART: S1 S2  regular; no murmurs, gallops or rubs  ABDOMEN: Soft, Nontender, Nondistended; Bowel sounds present  EXTREMITIES: no cyanosis; no edema; no calf tenderness  SKIN: warm and dry; no rash  NERVOUS SYSTEM:  Awake and alert; Unable to obtain due to mentation  LABS:      CAPILLARY BLOOD GLUCOSE    RADIOLOGY & ADDITIONAL TESTS:    < from: CT Chest w/ IV Cont (05.05.23 @ 13:36) >  ACC: [id number] EXAM:  CT ABDOMEN AND PELVIS OC IC   ORDERED BY: RINKU LEMONS     ACC: [id number] EXAM:  CT CHEST IC   ORDERED BY: RINKU LEMONS     PROCEDURE DATE:  05/05/2023          INTERPRETATION:  CLINICAL INFORMATION: Colon cancer with metastatic   disease    COMPARISON: MRI abdomen 10/8/2017.    CONTRAST/COMPLICATIONS:  IV Contrast: Omnipaque 350 (accession [id number]), IV contrast documented   in unlinked concurrent exam (accession [id number])  90 cc administered   10   cc discarded  Oral Contrast: NONE (accession [id number]), Gastroview (accession [id number])  Complications: None reported at time of study completion    PROCEDURE:  CT of the Chest, Abdomen and Pelvis was performed.  Sagittal and coronal reformats were performed.    FINDINGS:    CHEST:  LUNGS AND LARGE AIRWAYS: Respiratory motion limits evaluation of the   airways. Trace airway secretions. Large right pleural effusion with   adjacent lung atelectasis. Trace left pleural effusion with adjacent left   basilar atelectasis. Multiple metastatic pulmonary nodules, for reference   measuring 15 mm of the right upper lobe, image 48 series 2 and 13 mm of   the left lower lobe image 75 series 2.  PLEURA: Large right pleural effusion with adjacent compressive   atelectasis. Trace left pleural effusion with adjacent atelectasis. No   pneumothorax.  VESSELS: Port catheter terminates at the cavoatrial junction. Normal   caliber of the thoracic aorta with atheromatous changes. Main pulmonary   artery size is within normal limits.  HEART: Heart size is within normal limits. No pericardial effusion.  MEDIASTINUM AND LIBBY: Right cardiophrenic node measures 1 cm, image 113   series 2, enlarged and pathologic appearing. Additional small volume   nodes of the thorax. Esophagus is nondistended.  CHEST WALL AND LOWER NECK: Right chest wall port. Additional shunt   catheter is seen along the right anterior subcutaneous soft tissues,   terminating in the right mid abdomen. Visualized thyroid is unremarkable.    ABDOMEN AND PELVIS:  LIVER: Hepatic metastatic disease most prominent of the right hepatic   lobe. For reference, confluent right hepatic tumor measures 9.4 cm in   transverse dimension, image 135 series 2, with suggested capsular   disruption on image 147 series 2. Subcentimeter left hepatic hypodense   lesions are identified, too small to characterize. Additional metastatic   disease cannot be excluded. Peritoneal disease results in hepatic   capsular implants, for example along the medial left hepatic lobe, image  134 series 2. Main portal vein is patent.  BILE DUCTS: No distention  GALLBLADDER: Likely surgically absent  SPLEEN: Enlarged, 14 cm craniocaudal dimension. Capsular implants   secondary to peritoneal disease, for example image 109 series 2.   Additional small hypodensities of the spleen are suboptimally   characterized.  PANCREAS: No acute peripancreatic inflammation. 1 cm cystic lesion of the   pancreatic body similar to prior MRI from 2017. 13 mm pancreatic tail   hypodense lesion, image 141series 2 previously measured 17 mm in 2017.  ADRENALS: Nodular thickening of the right adrenal is new since 2017.   Slightly thickened left adrenal as well.  KIDNEYS/URETERS: No hydronephrosis. Subcentimeter hypodensities too small   to characterize.    BLADDER: Minimally distended.  REPRODUCTIVE ORGANS: Enlarged prostate    BOWEL: Stomach is underdistended. No small bowel distention. No secondary   signs of acute appendicitis. Mild stool burden of the colon limits   evaluation of the mucosa. Rectosigmoid anastomosis.  PERITONEUM: Moderate ascites. Extensive peritoneal/omental   carcinomatosis. For reference, right-sided peritoneal mass lesion   measures 7.6 x 2.6 cm, image 156 series 2. Carcinomatosis also results in   capsular implants along the liver and spleen. Serosal disease of the   bowel is also likely present for example at the colonic hepatic flexure   on image 148 series 2. Shunt catheter terminates along the right mid   abdomen.  VESSELS: No abdominal aortic aneurysm. Atheromatous changes.   Redemonstrated proximal/mid SMA dissection, with aneurysmal dilatation up   to 16 mm, similar to prior imaging from 2017 given differences in MRI and   CT technique.  RETROPERITONEUM/LYMPH NODES: Few abnormal lymph nodes, for example at   right cardiophrenic station measuring 1 cm, image 113 series 2.   Periceliac node measures 12 mm, image 137 series 2.  ABDOMINAL WALL: Postsurgical changes.  BONES: Degenerative changes. Spinal scoliotic curvature. L5-S1 fusion   hardware with associated laminectomy. L1 vertebral body with bilateral   13th ribs. Few sclerotic foci of the bones are indeterminate. For   reference, 7 mm left iliac wing sclerotic lesion, image 237 series 2.    IMPRESSION:    Extensive metastatic neoplasm of the lungs and liver in this patient with   history of colon cancer. Extensive peritoneal/omental carcinomatosis and   moderate ascites. Capsular disease of the liver and spleen. Likely   serosal disease along the colon. Few pathologic lymph nodes identified.   Nodular thickening of the right adrenal gland, possibly related to   neoplastic disease as well.    Large right pleural effusion with adjacent compressive atelectasis of the   right lung.    Few sclerotic foci of the bones are indeterminate. For reference, 7 mm   left iliac wing sclerotic lesion, image 237 series 2. Correlation with   bone scan is recommended.    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    < end of copied text >    Imaging Personally Reviewed:  YES/NO    Consultant(s) Notes Reviewed:   YES/ No    Care Discussed with Consultants :     Plan of care was discussed with patient and /or primary care giver; all questions and concerns were addressed and care was aligned with patient's wishes.

## 2023-05-10 NOTE — PROGRESS NOTE ADULT - PROBLEM SELECTOR PLAN 6
77 year old male, from home hospice, with PMHx HTN, HLD, BPH, PAF (on Eliquis), now with extensive carcinoma of colon metastatic to multiple sites (s/p chemo and surgery), and NPH (w/  shunt), and advanced dementia.  Patient presenting disease and symptoms' burden and is appropriate for hospice.    Patient for discharge to SNF, [facility] with hospice care services     Palliative Care will sign off. Please reconsult as needed 77 year old male, from home hospice, with PMHx HTN, HLD, BPH, PAF (on Eliquis), now with extensive carcinoma of colon metastatic to multiple sites (s/p chemo and surgery), and NPH (w/  shunt), and advanced dementia.  Patient presenting disease and symptoms' burden and is appropriate for hospice.    Patient for discharge to SNF, [facility] with hospice care services     Case discussed with Primary Team.    Palliative Care will sign off. Please reconsult as needed

## 2023-05-10 NOTE — PROGRESS NOTE ADULT - SUBJECTIVE AND OBJECTIVE BOX
Patient is a 77y old  Male who presents with a chief complaint of Intractable pain (09 May 2023 10:56)      INTERVAL HPI/OVERNIGHT EVENTS: pt seen and examined      MEDICATIONS  (STANDING):  amLODIPine   Tablet 5 milliGRAM(s) Oral daily  apixaban 5 milliGRAM(s) Oral every 12 hours  atorvastatin 20 milliGRAM(s) Oral at bedtime  cholecalciferol 5000 Unit(s) Oral daily  cyanocobalamin 1000 MICROGram(s) Oral daily  enalapril 20 milliGRAM(s) Oral every 12 hours  finasteride 5 milliGRAM(s) Oral daily  lidocaine   4% Patch 1 Patch Transdermal daily  metoprolol succinate ER 50 milliGRAM(s) Oral daily  naloxone Injectable 0.4 milliGRAM(s) IV Push once  polyethylene glycol 3350 17 Gram(s) Oral daily  senna 2 Tablet(s) Oral at bedtime  sertraline 100 milliGRAM(s) Oral daily    MEDICATIONS  (PRN):  acetaminophen     Tablet .. 650 milliGRAM(s) Oral every 6 hours PRN Temp greater or equal to 38C (100.4F), Mild Pain (1 - 3)  aluminum hydroxide/magnesium hydroxide/simethicone Suspension 30 milliLiter(s) Oral every 4 hours PRN Dyspepsia  bisacodyl 5 milliGRAM(s) Oral daily PRN Constipation  LORazepam     Tablet 0.5 milliGRAM(s) Oral every 6 hours PRN Anxiety  melatonin 3 milliGRAM(s) Oral at bedtime PRN Insomnia  ondansetron Injectable 4 milliGRAM(s) IV Push every 8 hours PRN Nausea and/or Vomiting  oxyCODONE    IR 2.5 milliGRAM(s) Oral every 4 hours PRN Moderate Pain (4 - 6)  oxyCODONE    IR 5 milliGRAM(s) Oral every 4 hours PRN Severe Pain (7 - 10)      __________________________________________________  REVIEW OF SYSTEMS: Unable to obtain due to mentation          Vital Signs Last 24 Hrs  T(C): 36.7 (10 May 2023 05:03), Max: 37.3 (09 May 2023 14:31)  T(F): 98.1 (10 May 2023 05:03), Max: 99.2 (09 May 2023 14:31)  HR: 95 (10 May 2023 05:03) (88 - 106)  BP: 154/90 (10 May 2023 05:03) (124/85 - 154/90)  BP(mean): --  RR: 18 (10 May 2023 05:03) (18 - 18)  SpO2: 95% (10 May 2023 05:03) (92% - 95%)    Parameters below as of 10 May 2023 05:03  Patient On (Oxygen Delivery Method): nasal cannula  O2 Flow (L/min): 2      ________________________________________________  PHYSICAL EXAM:  Chronically ill appearing, comfortable  GENERAL: NAD  HEENT: Normocephalic;  conjunctivae and sclerae clear; moist mucous membranes;   NECK : supple  CHEST/LUNG: dec breath sounds at bases   HEART: S1 S2  regular; no murmurs, gallops or rubs  ABDOMEN: Soft, Nontender, Nondistended; Bowel sounds present  EXTREMITIES: no cyanosis; no edema; no calf tenderness  SKIN: warm and dry; no rash  NERVOUS SYSTEM:  Awake and alert; Unable to obtain due to mentation  LABS:      CAPILLARY BLOOD GLUCOSE    RADIOLOGY & ADDITIONAL TESTS:    < from: CT Chest w/ IV Cont (05.05.23 @ 13:36) >  ACC: [id number] EXAM:  CT ABDOMEN AND PELVIS OC IC   ORDERED BY: RINKU LEMONS     ACC: [id number] EXAM:  CT CHEST IC   ORDERED BY: RINKU LEMONS     PROCEDURE DATE:  05/05/2023          INTERPRETATION:  CLINICAL INFORMATION: Colon cancer with metastatic   disease    COMPARISON: MRI abdomen 10/8/2017.    CONTRAST/COMPLICATIONS:  IV Contrast: Omnipaque 350 (accession [id number]), IV contrast documented   in unlinked concurrent exam (accession [id number])  90 cc administered   10   cc discarded  Oral Contrast: NONE (accession [id number]), Gastroview (accession [id number])  Complications: None reported at time of study completion    PROCEDURE:  CT of the Chest, Abdomen and Pelvis was performed.  Sagittal and coronal reformats were performed.    FINDINGS:    CHEST:  LUNGS AND LARGE AIRWAYS: Respiratory motion limits evaluation of the   airways. Trace airway secretions. Large right pleural effusion with   adjacent lung atelectasis. Trace left pleural effusion with adjacent left   basilar atelectasis. Multiple metastatic pulmonary nodules, for reference   measuring 15 mm of the right upper lobe, image 48 series 2 and 13 mm of   the left lower lobe image 75 series 2.  PLEURA: Large right pleural effusion with adjacent compressive   atelectasis. Trace left pleural effusion with adjacent atelectasis. No   pneumothorax.  VESSELS: Port catheter terminates at the cavoatrial junction. Normal   caliber of the thoracic aorta with atheromatous changes. Main pulmonary   artery size is within normal limits.  HEART: Heart size is within normal limits. No pericardial effusion.  MEDIASTINUM AND LIBBY: Right cardiophrenic node measures 1 cm, image 113   series 2, enlarged and pathologic appearing. Additional small volume   nodes of the thorax. Esophagus is nondistended.  CHEST WALL AND LOWER NECK: Right chest wall port. Additional shunt   catheter is seen along the right anterior subcutaneous soft tissues,   terminating in the right mid abdomen. Visualized thyroid is unremarkable.    ABDOMEN AND PELVIS:  LIVER: Hepatic metastatic disease most prominent of the right hepatic   lobe. For reference, confluent right hepatic tumor measures 9.4 cm in   transverse dimension, image 135 series 2, with suggested capsular   disruption on image 147 series 2. Subcentimeter left hepatic hypodense   lesions are identified, too small to characterize. Additional metastatic   disease cannot be excluded. Peritoneal disease results in hepatic   capsular implants, for example along the medial left hepatic lobe, image  134 series 2. Main portal vein is patent.  BILE DUCTS: No distention  GALLBLADDER: Likely surgically absent  SPLEEN: Enlarged, 14 cm craniocaudal dimension. Capsular implants   secondary to peritoneal disease, for example image 109 series 2.   Additional small hypodensities of the spleen are suboptimally   characterized.  PANCREAS: No acute peripancreatic inflammation. 1 cm cystic lesion of the   pancreatic body similar to prior MRI from 2017. 13 mm pancreatic tail   hypodense lesion, image 141series 2 previously measured 17 mm in 2017.  ADRENALS: Nodular thickening of the right adrenal is new since 2017.   Slightly thickened left adrenal as well.  KIDNEYS/URETERS: No hydronephrosis. Subcentimeter hypodensities too small   to characterize.    BLADDER: Minimally distended.  REPRODUCTIVE ORGANS: Enlarged prostate    BOWEL: Stomach is underdistended. No small bowel distention. No secondary   signs of acute appendicitis. Mild stool burden of the colon limits   evaluation of the mucosa. Rectosigmoid anastomosis.  PERITONEUM: Moderate ascites. Extensive peritoneal/omental   carcinomatosis. For reference, right-sided peritoneal mass lesion   measures 7.6 x 2.6 cm, image 156 series 2. Carcinomatosis also results in   capsular implants along the liver and spleen. Serosal disease of the   bowel is also likely present for example at the colonic hepatic flexure   on image 148 series 2. Shunt catheter terminates along the right mid   abdomen.  VESSELS: No abdominal aortic aneurysm. Atheromatous changes.   Redemonstrated proximal/mid SMA dissection, with aneurysmal dilatation up   to 16 mm, similar to prior imaging from 2017 given differences in MRI and   CT technique.  RETROPERITONEUM/LYMPH NODES: Few abnormal lymph nodes, for example at   right cardiophrenic station measuring 1 cm, image 113 series 2.   Periceliac node measures 12 mm, image 137 series 2.  ABDOMINAL WALL: Postsurgical changes.  BONES: Degenerative changes. Spinal scoliotic curvature. L5-S1 fusion   hardware with associated laminectomy. L1 vertebral body with bilateral   13th ribs. Few sclerotic foci of the bones are indeterminate. For   reference, 7 mm left iliac wing sclerotic lesion, image 237 series 2.    IMPRESSION:    Extensive metastatic neoplasm of the lungs and liver in this patient with   history of colon cancer. Extensive peritoneal/omental carcinomatosis and   moderate ascites. Capsular disease of the liver and spleen. Likely   serosal disease along the colon. Few pathologic lymph nodes identified.   Nodular thickening of the right adrenal gland, possibly related to   neoplastic disease as well.    Large right pleural effusion with adjacent compressive atelectasis of the   right lung.    Few sclerotic foci of the bones are indeterminate. For reference, 7 mm   left iliac wing sclerotic lesion, image 237 series 2. Correlation with   bone scan is recommended.    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    < end of copied text >    Imaging Personally Reviewed:  YES/NO    Consultant(s) Notes Reviewed:   YES/ No    Care Discussed with Consultants :     Plan of care was discussed with patient and /or primary care giver; all questions and concerns were addressed and care was aligned with patient's wishes.

## 2023-05-11 NOTE — PROGRESS NOTE ADULT - SUBJECTIVE AND OBJECTIVE BOX
Patient is a 77y old  Male who presents with a chief complaint of Intractable pain (10 May 2023 12:15)      INTERVAL HPI/OVERNIGHT EVENTS: No acute events overnight         REVIEW OF SYSTEMS: unable to assess due to pt mentals status    T(C): 37.2 (05-11-23 @ 05:02), Max: 38.6 (05-10-23 @ 14:03)  HR: 107 (05-11-23 @ 05:02) (92 - 112)  BP: 137/96 (05-11-23 @ 05:02) (137/96 - 153/97)  RR: 18 (05-11-23 @ 05:02) (17 - 18)  SpO2: 93% (05-11-23 @ 05:02) (93% - 94%)  Wt(kg): --Vital Signs Last 24 Hrs  T(C): 37.2 (11 May 2023 05:02), Max: 38.6 (10 May 2023 14:03)  T(F): 99 (11 May 2023 05:02), Max: 101.5 (10 May 2023 14:03)  HR: 107 (11 May 2023 05:02) (92 - 112)  BP: 137/96 (11 May 2023 05:02) (137/96 - 153/97)  BP(mean): --  RR: 18 (11 May 2023 05:02) (17 - 18)  SpO2: 93% (11 May 2023 05:02) (93% - 94%)    Parameters below as of 11 May 2023 05:02  Patient On (Oxygen Delivery Method): nasal cannula  O2 Flow (L/min): 2    MEDICATIONS  (STANDING):  amLODIPine   Tablet 5 milliGRAM(s) Oral daily  apixaban 5 milliGRAM(s) Oral every 12 hours  atorvastatin 20 milliGRAM(s) Oral at bedtime  cholecalciferol 5000 Unit(s) Oral daily  cyanocobalamin 1000 MICROGram(s) Oral daily  enalapril 20 milliGRAM(s) Oral every 12 hours  finasteride 5 milliGRAM(s) Oral daily  lidocaine   4% Patch 1 Patch Transdermal daily  metoprolol succinate ER 50 milliGRAM(s) Oral daily  naloxone Injectable 0.4 milliGRAM(s) IV Push once  polyethylene glycol 3350 17 Gram(s) Oral daily  senna 2 Tablet(s) Oral at bedtime  sertraline 100 milliGRAM(s) Oral daily    MEDICATIONS  (PRN):  acetaminophen     Tablet .. 650 milliGRAM(s) Oral every 6 hours PRN Temp greater or equal to 38C (100.4F), Mild Pain (1 - 3)  aluminum hydroxide/magnesium hydroxide/simethicone Suspension 30 milliLiter(s) Oral every 4 hours PRN Dyspepsia  bisacodyl 5 milliGRAM(s) Oral daily PRN Constipation  melatonin 3 milliGRAM(s) Oral at bedtime PRN Insomnia  ondansetron Injectable 4 milliGRAM(s) IV Push every 8 hours PRN Nausea and/or Vomiting      PHYSICAL EXAM:  GENERAL: NAD  EYES: clear conjunctiva; EOMI  ENMT: Moist mucous membranes  NECK: Supple, No JVD, Normal thyroid  CHEST/LUNG: Clear to auscultation bilaterally; No rales, rhonchi, wheezing, or rubs  HEART: S1, S2, Regular rate and rhythm  ABDOMEN: Soft, Nontender, Nondistended; Bowel sounds present  NEURO: Alert & Oriented X3  EXTREMITIES: No LE edema, no calf tenderness  LYMPH: No lymphadenopathy noted  SKIN: No rashes or lesions    Consultant(s) Notes Reviewed:  [x ] YES  [ ] NO  Care Discussed with Consultants/Other Providers [ x] YES  [ ] NO    LABS:    CAPILLARY BLOOD GLUCOSE    RADIOLOGY & ADDITIONAL TESTS:    Imaging Personally Reviewed:  [ ] YES  [x ] NO

## 2023-05-11 NOTE — PROGRESS NOTE ADULT - SUBJECTIVE AND OBJECTIVE BOX
Patient is a 77y old  Male who presents with a chief complaint of Intractable pain (10 May 2023 12:15)      INTERVAL HPI/OVERNIGHT EVENTS: pt seen and examined    REVIEW OF SYSTEMS: unable to assess due to pt mentals status    T(C): 37.2 (05-11-23 @ 05:02), Max: 38.6 (05-10-23 @ 14:03)  HR: 107 (05-11-23 @ 05:02) (92 - 112)  BP: 137/96 (05-11-23 @ 05:02) (137/96 - 153/97)  RR: 18 (05-11-23 @ 05:02) (17 - 18)  SpO2: 93% (05-11-23 @ 05:02) (93% - 94%)  Wt(kg): --Vital Signs Last 24 Hrs  T(C): 37.2 (11 May 2023 05:02), Max: 38.6 (10 May 2023 14:03)  T(F): 99 (11 May 2023 05:02), Max: 101.5 (10 May 2023 14:03)  HR: 107 (11 May 2023 05:02) (92 - 112)  BP: 137/96 (11 May 2023 05:02) (137/96 - 153/97)  BP(mean): --  RR: 18 (11 May 2023 05:02) (17 - 18)  SpO2: 93% (11 May 2023 05:02) (93% - 94%)    Parameters below as of 11 May 2023 05:02  Patient On (Oxygen Delivery Method): nasal cannula  O2 Flow (L/min): 2    MEDICATIONS  (STANDING):  amLODIPine   Tablet 5 milliGRAM(s) Oral daily  apixaban 5 milliGRAM(s) Oral every 12 hours  atorvastatin 20 milliGRAM(s) Oral at bedtime  cholecalciferol 5000 Unit(s) Oral daily  cyanocobalamin 1000 MICROGram(s) Oral daily  enalapril 20 milliGRAM(s) Oral every 12 hours  finasteride 5 milliGRAM(s) Oral daily  lidocaine   4% Patch 1 Patch Transdermal daily  metoprolol succinate ER 50 milliGRAM(s) Oral daily  naloxone Injectable 0.4 milliGRAM(s) IV Push once  polyethylene glycol 3350 17 Gram(s) Oral daily  senna 2 Tablet(s) Oral at bedtime  sertraline 100 milliGRAM(s) Oral daily    MEDICATIONS  (PRN):  acetaminophen     Tablet .. 650 milliGRAM(s) Oral every 6 hours PRN Temp greater or equal to 38C (100.4F), Mild Pain (1 - 3)  aluminum hydroxide/magnesium hydroxide/simethicone Suspension 30 milliLiter(s) Oral every 4 hours PRN Dyspepsia  bisacodyl 5 milliGRAM(s) Oral daily PRN Constipation  melatonin 3 milliGRAM(s) Oral at bedtime PRN Insomnia  ondansetron Injectable 4 milliGRAM(s) IV Push every 8 hours PRN Nausea and/or Vomiting      PHYSICAL EXAM:  GENERAL: NAD  EYES: clear conjunctiva; EOMI  ENMT: Moist mucous membranes  NECK: Supple, No JVD, Normal thyroid  CHEST/LUNG: dec breath sounds at bases  HEART: S1, S2, Regular rate and rhythm  ABDOMEN: Soft, Nontender, Nondistended; Bowel sounds present  NEURO: Alert awake   EXTREMITIES: No LE edema, no calf tenderness  LYMPH: No lymphadenopathy noted  SKIN: No rashes or lesions    Consultant(s) Notes Reviewed:  [x ] YES  [ ] NO  Care Discussed with Consultants/Other Providers [ x] YES  [ ] NO    LABS:    CAPILLARY BLOOD GLUCOSE    RADIOLOGY & ADDITIONAL TESTS:    Imaging Personally Reviewed:  [ ] YES  [x ] NO

## 2023-05-12 NOTE — PROGRESS NOTE ADULT - SUBJECTIVE AND OBJECTIVE BOX
Patient is a 77y old  Male who presents with a chief complaint of Intractable pain (11 May 2023 11:19)      INTERVAL HPI/OVERNIGHT EVENTS: no acute events overnight, Pt is more awake today, talking to NP. tolerating po    REVIEW OF SYSTEMS:  CONSTITUTIONAL: No fever, chills  ENMT:  No difficulty hearing, no change in vision  NECK: No pain or stiffness  RESPIRATORY: No cough, SOB  CARDIOVASCULAR: No chest pain, palpitations  GASTROINTESTINAL: No abdominal pain. No nausea, vomiting, or diarrhea  GENITOURINARY: No dysuria  NEUROLOGICAL: No HA  SKIN: No itching, burning, rashes, or lesions   LYMPH NODES: No enlarged glands  ENDOCRINE: No heat or cold intolerance; No hair loss  MUSCULOSKELETAL: No joint pain or swelling; No muscle, back, or extremity pain  PSYCHIATRIC: No depression, anxiety  HEME/LYMPH: No easy bruising, or bleeding gums    T(C): 37.7 (05-12-23 @ 14:02), Max: 38 (05-11-23 @ 20:41)  HR: 107 (05-12-23 @ 14:02) (62 - 107)  BP: 130/72 (05-12-23 @ 14:02) (130/72 - 133/84)  RR: 20 (05-12-23 @ 14:02) (18 - 20)  SpO2: 92% (05-12-23 @ 14:02) (92% - 94%)  Wt(kg): --Vital Signs Last 24 Hrs  T(C): 37.7 (12 May 2023 14:02), Max: 38 (11 May 2023 20:41)  T(F): 99.8 (12 May 2023 14:02), Max: 100.4 (11 May 2023 20:41)  HR: 107 (12 May 2023 14:02) (62 - 107)  BP: 130/72 (12 May 2023 14:02) (130/72 - 133/84)  BP(mean): --  RR: 20 (12 May 2023 14:02) (18 - 20)  SpO2: 92% (12 May 2023 14:02) (92% - 94%)    Parameters below as of 12 May 2023 14:02  Patient On (Oxygen Delivery Method): nasal cannula  O2 Flow (L/min): 2    MEDICATIONS  (STANDING):  amLODIPine   Tablet 5 milliGRAM(s) Oral daily  apixaban 5 milliGRAM(s) Oral every 12 hours  atorvastatin 20 milliGRAM(s) Oral at bedtime  cholecalciferol 5000 Unit(s) Oral daily  cyanocobalamin 1000 MICROGram(s) Oral daily  enalapril 20 milliGRAM(s) Oral every 12 hours  finasteride 5 milliGRAM(s) Oral daily  lidocaine   4% Patch 1 Patch Transdermal daily  metoprolol succinate ER 50 milliGRAM(s) Oral daily  naloxone Injectable 0.4 milliGRAM(s) IV Push once  polyethylene glycol 3350 17 Gram(s) Oral daily  senna 2 Tablet(s) Oral at bedtime  sertraline 100 milliGRAM(s) Oral daily    MEDICATIONS  (PRN):  acetaminophen     Tablet .. 650 milliGRAM(s) Oral every 6 hours PRN Temp greater or equal to 38C (100.4F), Mild Pain (1 - 3)  aluminum hydroxide/magnesium hydroxide/simethicone Suspension 30 milliLiter(s) Oral every 4 hours PRN Dyspepsia  bisacodyl 5 milliGRAM(s) Oral daily PRN Constipation  melatonin 3 milliGRAM(s) Oral at bedtime PRN Insomnia  ondansetron Injectable 4 milliGRAM(s) IV Push every 8 hours PRN Nausea and/or Vomiting      PHYSICAL EXAM:  GENERAL: NAD  EYES: clear conjunctiva; EOMI  ENMT: Moist mucous membranes  NECK: Supple, No JVD, Normal thyroid  CHEST/LUNG: dec breath sounds at bases  HEART: S1, S2, Regular rate and rhythm  ABDOMEN: Soft, Nontender, Nondistended; Bowel sounds present  NEURO: Alert awake  EXTREMITIES: No LE edema, no calf tenderness  LYMPH: No lymphadenopathy noted  SKIN: No rashes or lesions    Consultant(s) Notes Reviewed:  [x ] YES  [ ] NO  Care Discussed with Consultants/Other Providers [ x] YES  [ ] NO    LABS:    CAPILLARY BLOOD GLUCOSE      POCT Blood Glucose.: 175 mg/dL (11 May 2023 20:51)    RADIOLOGY & ADDITIONAL TESTS:    Imaging Personally Reviewed:  [ ] YES  [x ] NO  < from: CT Chest w/ IV Cont (05.05.23 @ 13:36) >    ACC: [id number] EXAM:  CT ABDOMEN AND PELVIS OC IC   ORDERED BY: RINKU LEMONS     ACC: [id number] EXAM:  CT CHEST IC   ORDERED BY: RINKU LEMONS     PROCEDURE DATE:  05/05/2023          INTERPRETATION:  CLINICAL INFORMATION: Colon cancer with metastatic   disease    COMPARISON: MRI abdomen 10/8/2017.    CONTRAST/COMPLICATIONS:  IV Contrast: Omnipaque 350 (accession [id number]), IV contrast documented   in unlinked concurrent exam (accession [id number])  90 cc administered   10   cc discarded  Oral Contrast: NONE (accession [id number]), [facility] (accession [id number])  Complications: None reported at time of study completion    PROCEDURE:  CT of the Chest, Abdomen and Pelvis was performed.  Sagittal and coronal reformats were performed.    FINDINGS:    CHEST:  LUNGS AND LARGE AIRWAYS: Respiratory motion limits evaluation of the   airways. Trace airway secretions. Large right pleural effusion with   adjacent lung atelectasis. Trace left pleural effusion with adjacent left   basilar atelectasis. Multiple metastatic pulmonary nodules, for reference   measuring 15 mm of the right upper lobe, image 48 series 2 and 13 mm of   the left lower lobe image 75 series 2.  PLEURA: Large right pleural effusion with adjacent compressive   atelectasis. Trace left pleural effusion with adjacent atelectasis. No   pneumothorax.  VESSELS: Port catheter terminates at the cavoatrial junction. Normal   caliber of the thoracic aorta with atheromatous changes. Main pulmonary   artery size is within normal limits.  HEART: Heart size is within normal limits. No pericardial effusion.  MEDIASTINUM AND LIBBY: Right cardiophrenic node measures 1 cm, image 113   series 2, enlarged and pathologic appearing. Additional small volume   nodes of the thorax. Esophagus is nondistended.  CHEST WALL AND LOWER NECK: Right chest wall port. Additional shunt   catheter is seen along the right anterior subcutaneous soft tissues,   terminating in the right mid abdomen. Visualized thyroid is unremarkable.    ABDOMEN AND PELVIS:  LIVER: Hepatic metastatic disease most prominent of the right hepatic   lobe. For reference, confluent right hepatic tumor measures 9.4 cm in   transverse dimension, image 135 series 2, with suggested capsular   disruption on image 147 series 2. Subcentimeter left hepatic hypodense   lesions are identified, too small to characterize. Additional metastatic   disease cannot be excluded. Peritoneal disease results in hepatic   capsular implants, for example along the medial left hepatic lobe, image  134 series 2. Main portal vein is patent.  BILE DUCTS: No distention  GALLBLADDER: Likely surgically absent  SPLEEN: Enlarged, 14 cm craniocaudal dimension. Capsular implants   secondary to peritoneal disease, for example image 109 series 2.   Additional small hypodensities of the spleen are suboptimally   characterized.  PANCREAS: No acute peripancreatic inflammation. 1 cm cystic lesion of the   pancreatic body similar to prior MRI from 2017. 13 mm pancreatic tail   hypodense lesion, image 141series 2 previously measured 17 mm in 2017.  ADRENALS: Nodular thickening of the right adrenal is new since 2017.   Slightly thickened left adrenal as well.  KIDNEYS/URETERS: No hydronephrosis. Subcentimeter hypodensities too small   to characterize.    BLADDER: Minimally distended.  REPRODUCTIVE ORGANS: Enlarged prostate    BOWEL: Stomach is underdistended. No small bowel distention. No secondary   signs of acute appendicitis. Mild stool burden of the colon limits   evaluation of the mucosa. Rectosigmoid anastomosis.  PERITONEUM: Moderate ascites. Extensive peritoneal/omental   carcinomatosis. For reference, right-sided peritoneal mass lesion   measures 7.6 x 2.6 cm, image 156 series 2. Carcinomatosis also results in   capsular implants along the liver and spleen. Serosal disease of the   bowel is also likely present for example at the colonic hepatic flexure   on image 148 series 2. Shunt catheter terminates along the right mid   abdomen.  VESSELS: No abdominal aortic aneurysm. Atheromatous changes.   Redemonstrated proximal/mid SMA dissection, with aneurysmal dilatation up   to 16 mm, similar to prior imaging from 2017 given differences in MRI and   CT technique.  RETROPERITONEUM/LYMPH NODES: Few abnormal lymph nodes, for example at   right cardiophrenic station measuring 1 cm, image 113 series 2.   Periceliac node measures 12 mm, image 137 series 2.  ABDOMINAL WALL: Postsurgical changes.  BONES: Degenerative changes. Spinal scoliotic curvature. L5-S1 fusion   hardware with associated laminectomy. L1 vertebral body with bilateral   13th ribs. Few sclerotic foci of the bones are indeterminate. For   reference, 7 mm left iliac wing sclerotic lesion, image 237 series 2.    IMPRESSION:    Extensive metastatic neoplasm of the lungs and liver in this patient with   history of colon cancer. Extensive peritoneal/omental carcinomatosis and   moderate ascites. Capsular disease of the liver and spleen. Likely   serosal disease along the colon. Few pathologic lymph nodes identified.   Nodular thickening of the right adrenal gland, possibly related to   neoplastic disease as well.    Large right pleural effusion with adjacent compressive atelectasis of the   right lung.    Few sclerotic foci of the bones are indeterminate. For reference, 7 mm   left iliac wing sclerotic lesion, image 237 series 2. Correlation with   bone scan is recommended.    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    --- End of Report ---            VEDA SANTILLAN M.D., ATTENDING RADIOLOGIST  This document has been electronically signed. May  5 2023  4:35PM    < end of copied text >

## 2023-05-12 NOTE — PROGRESS NOTE ADULT - SUBJECTIVE AND OBJECTIVE BOX
Patient is a 77y old  Male who presents with a chief complaint of Intractable pain (11 May 2023 11:19)      INTERVAL HPI/OVERNIGHT EVENTS: no acute events overnight, Pt is more awake today, talking to NP. tolerating po    REVIEW OF SYSTEMS:  CONSTITUTIONAL: No fever, chills  ENMT:  No difficulty hearing, no change in vision  NECK: No pain or stiffness  RESPIRATORY: No cough, SOB  CARDIOVASCULAR: No chest pain, palpitations  GASTROINTESTINAL: No abdominal pain. No nausea, vomiting, or diarrhea  GENITOURINARY: No dysuria  NEUROLOGICAL: No HA  SKIN: No itching, burning, rashes, or lesions   LYMPH NODES: No enlarged glands  ENDOCRINE: No heat or cold intolerance; No hair loss  MUSCULOSKELETAL: No joint pain or swelling; No muscle, back, or extremity pain  PSYCHIATRIC: No depression, anxiety  HEME/LYMPH: No easy bruising, or bleeding gums    T(C): 37.7 (05-12-23 @ 14:02), Max: 38 (05-11-23 @ 20:41)  HR: 107 (05-12-23 @ 14:02) (62 - 107)  BP: 130/72 (05-12-23 @ 14:02) (130/72 - 133/84)  RR: 20 (05-12-23 @ 14:02) (18 - 20)  SpO2: 92% (05-12-23 @ 14:02) (92% - 94%)  Wt(kg): --Vital Signs Last 24 Hrs  T(C): 37.7 (12 May 2023 14:02), Max: 38 (11 May 2023 20:41)  T(F): 99.8 (12 May 2023 14:02), Max: 100.4 (11 May 2023 20:41)  HR: 107 (12 May 2023 14:02) (62 - 107)  BP: 130/72 (12 May 2023 14:02) (130/72 - 133/84)  BP(mean): --  RR: 20 (12 May 2023 14:02) (18 - 20)  SpO2: 92% (12 May 2023 14:02) (92% - 94%)    Parameters below as of 12 May 2023 14:02  Patient On (Oxygen Delivery Method): nasal cannula  O2 Flow (L/min): 2    MEDICATIONS  (STANDING):  amLODIPine   Tablet 5 milliGRAM(s) Oral daily  apixaban 5 milliGRAM(s) Oral every 12 hours  atorvastatin 20 milliGRAM(s) Oral at bedtime  cholecalciferol 5000 Unit(s) Oral daily  cyanocobalamin 1000 MICROGram(s) Oral daily  enalapril 20 milliGRAM(s) Oral every 12 hours  finasteride 5 milliGRAM(s) Oral daily  lidocaine   4% Patch 1 Patch Transdermal daily  metoprolol succinate ER 50 milliGRAM(s) Oral daily  naloxone Injectable 0.4 milliGRAM(s) IV Push once  polyethylene glycol 3350 17 Gram(s) Oral daily  senna 2 Tablet(s) Oral at bedtime  sertraline 100 milliGRAM(s) Oral daily    MEDICATIONS  (PRN):  acetaminophen     Tablet .. 650 milliGRAM(s) Oral every 6 hours PRN Temp greater or equal to 38C (100.4F), Mild Pain (1 - 3)  aluminum hydroxide/magnesium hydroxide/simethicone Suspension 30 milliLiter(s) Oral every 4 hours PRN Dyspepsia  bisacodyl 5 milliGRAM(s) Oral daily PRN Constipation  melatonin 3 milliGRAM(s) Oral at bedtime PRN Insomnia  ondansetron Injectable 4 milliGRAM(s) IV Push every 8 hours PRN Nausea and/or Vomiting      PHYSICAL EXAM:  GENERAL: NAD  EYES: clear conjunctiva; EOMI  ENMT: Moist mucous membranes  NECK: Supple, No JVD, Normal thyroid  CHEST/LUNG: Clear to auscultation bilaterally; No rales, rhonchi, wheezing, or rubs  HEART: S1, S2, Regular rate and rhythm  ABDOMEN: Soft, Nontender, Nondistended; Bowel sounds present  NEURO: Alert & Oriented X3  EXTREMITIES: No LE edema, no calf tenderness  LYMPH: No lymphadenopathy noted  SKIN: No rashes or lesions    Consultant(s) Notes Reviewed:  [x ] YES  [ ] NO  Care Discussed with Consultants/Other Providers [ x] YES  [ ] NO    LABS:    CAPILLARY BLOOD GLUCOSE      POCT Blood Glucose.: 175 mg/dL (11 May 2023 20:51)    RADIOLOGY & ADDITIONAL TESTS:    Imaging Personally Reviewed:  [ ] YES  [x ] NO  < from: CT Chest w/ IV Cont (05.05.23 @ 13:36) >    ACC: [id number] EXAM:  CT ABDOMEN AND PELVIS OC IC   ORDERED BY: RINKU LEMONS     ACC: [id number] EXAM:  CT CHEST IC   ORDERED BY: RINKU LEMONS     PROCEDURE DATE:  05/05/2023          INTERPRETATION:  CLINICAL INFORMATION: Colon cancer with metastatic   disease    COMPARISON: MRI abdomen 10/8/2017.    CONTRAST/COMPLICATIONS:  IV Contrast: Omnipaque 350 (accession [id number]), IV contrast documented   in unlinked concurrent exam (accession [id number])  90 cc administered   10   cc discarded  Oral Contrast: NONE (accession [id number]), [facility] (accession [id number])  Complications: None reported at time of study completion    PROCEDURE:  CT of the Chest, Abdomen and Pelvis was performed.  Sagittal and coronal reformats were performed.    FINDINGS:    CHEST:  LUNGS AND LARGE AIRWAYS: Respiratory motion limits evaluation of the   airways. Trace airway secretions. Large right pleural effusion with   adjacent lung atelectasis. Trace left pleural effusion with adjacent left   basilar atelectasis. Multiple metastatic pulmonary nodules, for reference   measuring 15 mm of the right upper lobe, image 48 series 2 and 13 mm of   the left lower lobe image 75 series 2.  PLEURA: Large right pleural effusion with adjacent compressive   atelectasis. Trace left pleural effusion with adjacent atelectasis. No   pneumothorax.  VESSELS: Port catheter terminates at the cavoatrial junction. Normal   caliber of the thoracic aorta with atheromatous changes. Main pulmonary   artery size is within normal limits.  HEART: Heart size is within normal limits. No pericardial effusion.  MEDIASTINUM AND LIBBY: Right cardiophrenic node measures 1 cm, image 113   series 2, enlarged and pathologic appearing. Additional small volume   nodes of the thorax. Esophagus is nondistended.  CHEST WALL AND LOWER NECK: Right chest wall port. Additional shunt   catheter is seen along the right anterior subcutaneous soft tissues,   terminating in the right mid abdomen. Visualized thyroid is unremarkable.    ABDOMEN AND PELVIS:  LIVER: Hepatic metastatic disease most prominent of the right hepatic   lobe. For reference, confluent right hepatic tumor measures 9.4 cm in   transverse dimension, image 135 series 2, with suggested capsular   disruption on image 147 series 2. Subcentimeter left hepatic hypodense   lesions are identified, too small to characterize. Additional metastatic   disease cannot be excluded. Peritoneal disease results in hepatic   capsular implants, for example along the medial left hepatic lobe, image  134 series 2. Main portal vein is patent.  BILE DUCTS: No distention  GALLBLADDER: Likely surgically absent  SPLEEN: Enlarged, 14 cm craniocaudal dimension. Capsular implants   secondary to peritoneal disease, for example image 109 series 2.   Additional small hypodensities of the spleen are suboptimally   characterized.  PANCREAS: No acute peripancreatic inflammation. 1 cm cystic lesion of the   pancreatic body similar to prior MRI from 2017. 13 mm pancreatic tail   hypodense lesion, image 141series 2 previously measured 17 mm in 2017.  ADRENALS: Nodular thickening of the right adrenal is new since 2017.   Slightly thickened left adrenal as well.  KIDNEYS/URETERS: No hydronephrosis. Subcentimeter hypodensities too small   to characterize.    BLADDER: Minimally distended.  REPRODUCTIVE ORGANS: Enlarged prostate    BOWEL: Stomach is underdistended. No small bowel distention. No secondary   signs of acute appendicitis. Mild stool burden of the colon limits   evaluation of the mucosa. Rectosigmoid anastomosis.  PERITONEUM: Moderate ascites. Extensive peritoneal/omental   carcinomatosis. For reference, right-sided peritoneal mass lesion   measures 7.6 x 2.6 cm, image 156 series 2. Carcinomatosis also results in   capsular implants along the liver and spleen. Serosal disease of the   bowel is also likely present for example at the colonic hepatic flexure   on image 148 series 2. Shunt catheter terminates along the right mid   abdomen.  VESSELS: No abdominal aortic aneurysm. Atheromatous changes.   Redemonstrated proximal/mid SMA dissection, with aneurysmal dilatation up   to 16 mm, similar to prior imaging from 2017 given differences in MRI and   CT technique.  RETROPERITONEUM/LYMPH NODES: Few abnormal lymph nodes, for example at   right cardiophrenic station measuring 1 cm, image 113 series 2.   Periceliac node measures 12 mm, image 137 series 2.  ABDOMINAL WALL: Postsurgical changes.  BONES: Degenerative changes. Spinal scoliotic curvature. L5-S1 fusion   hardware with associated laminectomy. L1 vertebral body with bilateral   13th ribs. Few sclerotic foci of the bones are indeterminate. For   reference, 7 mm left iliac wing sclerotic lesion, image 237 series 2.    IMPRESSION:    Extensive metastatic neoplasm of the lungs and liver in this patient with   history of colon cancer. Extensive peritoneal/omental carcinomatosis and   moderate ascites. Capsular disease of the liver and spleen. Likely   serosal disease along the colon. Few pathologic lymph nodes identified.   Nodular thickening of the right adrenal gland, possibly related to   neoplastic disease as well.    Large right pleural effusion with adjacent compressive atelectasis of the   right lung.    Few sclerotic foci of the bones are indeterminate. For reference, 7 mm   left iliac wing sclerotic lesion, image 237 series 2. Correlation with   bone scan is recommended.    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    --- End of Report ---            VEDA SANTILLAN M.D., ATTENDING RADIOLOGIST  This document has been electronically signed. May  5 2023  4:35PM    < end of copied text >

## 2023-05-13 NOTE — PROGRESS NOTE ADULT - REASON FOR ADMISSION
Intractable pain

## 2023-05-13 NOTE — PROGRESS NOTE ADULT - PROVIDER SPECIALTY LIST ADULT
Palliative Care
Palliative Care
Internal Medicine
Palliative Care
Internal Medicine

## 2023-05-13 NOTE — PROGRESS NOTE ADULT - PROBLEM SELECTOR PLAN 9
DVT PPX: Eliquis
Eliquis
DVT PPX: Eliquis
DVT PPX: Eliquis
Continue home regimen sertraline
Continue home regimen sertraline
DVT PPX: Eliquis
Continue home regimen sertraline
Eliquis
Continue home regimen sertraline
DVT PPX: Eliquis
Continue home regimen sertraline
Continue home regimen sertraline
DVT PPX: Eliquis
Continue home regimen sertraline
DVT PPX: Eliquis

## 2023-05-13 NOTE — PROGRESS NOTE ADULT - SUBJECTIVE AND OBJECTIVE BOX
Patient is a 77y old  Male who presents with a chief complaint of Intractable pain (11 May 2023 11:19)      INTERVAL HPI/OVERNIGHT EVENTS: pt seen and examined    MEDICATIONS  (STANDING):  apixaban 5 milliGRAM(s) Oral every 12 hours  lidocaine   4% Patch 1 Patch Transdermal daily  naloxone Injectable 0.4 milliGRAM(s) IV Push once  polyethylene glycol 3350 17 Gram(s) Oral daily  senna 2 Tablet(s) Oral at bedtime    MEDICATIONS  (PRN):  bisacodyl 5 milliGRAM(s) Oral daily PRN Constipation  glycopyrrolate Injectable 0.2 milliGRAM(s) IntraMuscular every 6 hours PRN secretion  HYDROmorphone  Injectable 1 milliGRAM(s) IV Push every 6 hours PRN for respiratory distress  ondansetron Injectable 4 milliGRAM(s) IV Push every 8 hours PRN Nausea and/or Vomiting    Vital Signs Last 24 Hrs  T(C): 36.8 (05-13-23 @ 21:30), Max: 36.9 (05-13-23 @ 04:50)  T(F): 98.2 (05-13-23 @ 21:30), Max: 98.4 (05-13-23 @ 04:50)  HR: 111 (05-13-23 @ 21:30) (101 - 111)  BP: 107/66 (05-13-23 @ 22:40) (93/66 - 143/90)  BP(mean): --  RR: 18 (05-13-23 @ 21:30) (18 - 18)  SpO2: 92% (05-13-23 @ 21:30) (92% - 93%)        PHYSICAL EXAM:  GENERAL: NAD  EYES: clear conjunctiva; EOMI  ENMT: Moist mucous membranes  NECK: Supple, No JVD, Normal thyroid  CHEST/LUNG: dec breath sounds at bases  HEART: S1, S2, Regular rate and rhythm  ABDOMEN: Soft, Nontender, Nondistended; Bowel sounds present  NEURO: Alert awake  EXTREMITIES: No LE edema, no calf tenderness  LYMPH: No lymphadenopathy noted  SKIN: No rashes or lesions    LABS:        Creatinine Trend:     Urine Studies:                  Consultant(s) Notes Reviewed:  [x ] YES  [ ] NO  Care Discussed with Consultants/Other Providers [ x] YES  [ ] NO    LABS:    CAPILLARY BLOOD GLUCOSE      POCT Blood Glucose.: 175 mg/dL (11 May 2023 20:51)    RADIOLOGY & ADDITIONAL TESTS:    Imaging Personally Reviewed:  [ ] YES  [x ] NO  < from: CT Chest w/ IV Cont (05.05.23 @ 13:36) >    ACC: [id number] EXAM:  CT ABDOMEN AND PELVIS OC IC   ORDERED BY: RINKU LEMONS     ACC: [id number] EXAM:  CT CHEST IC   ORDERED BY: RINKU LEMONS     PROCEDURE DATE:  05/05/2023          INTERPRETATION:  CLINICAL INFORMATION: Colon cancer with metastatic   disease    COMPARISON: MRI abdomen 10/8/2017.    CONTRAST/COMPLICATIONS:  IV Contrast: Omnipaque 350 (accession [id number]), IV contrast documented   in unlinked concurrent exam (accession [id number])  90 cc administered   10   cc discarded  Oral Contrast: NONE (accession [id number]), Gastroview (accession [id number])  Complications: None reported at time of study completion    PROCEDURE:  CT of the Chest, Abdomen and Pelvis was performed.  Sagittal and coronal reformats were performed.    FINDINGS:    CHEST:  LUNGS AND LARGE AIRWAYS: Respiratory motion limits evaluation of the   airways. Trace airway secretions. Large right pleural effusion with   adjacent lung atelectasis. Trace left pleural effusion with adjacent left   basilar atelectasis. Multiple metastatic pulmonary nodules, for reference   measuring 15 mm of the right upper lobe, image 48 series 2 and 13 mm of   the left lower lobe image 75 series 2.  PLEURA: Large right pleural effusion with adjacent compressive   atelectasis. Trace left pleural effusion with adjacent atelectasis. No   pneumothorax.  VESSELS: Port catheter terminates at the cavoatrial junction. Normal   caliber of the thoracic aorta with atheromatous changes. Main pulmonary   artery size is within normal limits.  HEART: Heart size is within normal limits. No pericardial effusion.  MEDIASTINUM AND LIBBY: Right cardiophrenic node measures 1 cm, image 113   series 2, enlarged and pathologic appearing. Additional small volume   nodes of the thorax. Esophagus is nondistended.  CHEST WALL AND LOWER NECK: Right chest wall port. Additional shunt   catheter is seen along the right anterior subcutaneous soft tissues,   terminating in the right mid abdomen. Visualized thyroid is unremarkable.    ABDOMEN AND PELVIS:  LIVER: Hepatic metastatic disease most prominent of the right hepatic   lobe. For reference, confluent right hepatic tumor measures 9.4 cm in   transverse dimension, image 135 series 2, with suggested capsular   disruption on image 147 series 2. Subcentimeter left hepatic hypodense   lesions are identified, too small to characterize. Additional metastatic   disease cannot be excluded. Peritoneal disease results in hepatic   capsular implants, for example along the medial left hepatic lobe, image  134 series 2. Main portal vein is patent.  BILE DUCTS: No distention  GALLBLADDER: Likely surgically absent  SPLEEN: Enlarged, 14 cm craniocaudal dimension. Capsular implants   secondary to peritoneal disease, for example image 109 series 2.   Additional small hypodensities of the spleen are suboptimally   characterized.  PANCREAS: No acute peripancreatic inflammation. 1 cm cystic lesion of the   pancreatic body similar to prior MRI from 2017. 13 mm pancreatic tail   hypodense lesion, image 141series 2 previously measured 17 mm in 2017.  ADRENALS: Nodular thickening of the right adrenal is new since 2017.   Slightly thickened left adrenal as well.  KIDNEYS/URETERS: No hydronephrosis. Subcentimeter hypodensities too small   to characterize.    BLADDER: Minimally distended.  REPRODUCTIVE ORGANS: Enlarged prostate    BOWEL: Stomach is underdistended. No small bowel distention. No secondary   signs of acute appendicitis. Mild stool burden of the colon limits   evaluation of the mucosa. Rectosigmoid anastomosis.  PERITONEUM: Moderate ascites. Extensive peritoneal/omental   carcinomatosis. For reference, right-sided peritoneal mass lesion   measures 7.6 x 2.6 cm, image 156 series 2. Carcinomatosis also results in   capsular implants along the liver and spleen. Serosal disease of the   bowel is also likely present for example at the colonic hepatic flexure   on image 148 series 2. Shunt catheter terminates along the right mid   abdomen.  VESSELS: No abdominal aortic aneurysm. Atheromatous changes.   Redemonstrated proximal/mid SMA dissection, with aneurysmal dilatation up   to 16 mm, similar to prior imaging from 2017 given differences in MRI and   CT technique.  RETROPERITONEUM/LYMPH NODES: Few abnormal lymph nodes, for example at   right cardiophrenic station measuring 1 cm, image 113 series 2.   Periceliac node measures 12 mm, image 137 series 2.  ABDOMINAL WALL: Postsurgical changes.  BONES: Degenerative changes. Spinal scoliotic curvature. L5-S1 fusion   hardware with associated laminectomy. L1 vertebral body with bilateral   13th ribs. Few sclerotic foci of the bones are indeterminate. For   reference, 7 mm left iliac wing sclerotic lesion, image 237 series 2.    IMPRESSION:    Extensive metastatic neoplasm of the lungs and liver in this patient with   history of colon cancer. Extensive peritoneal/omental carcinomatosis and   moderate ascites. Capsular disease of the liver and spleen. Likely   serosal disease along the colon. Few pathologic lymph nodes identified.   Nodular thickening of the right adrenal gland, possibly related to   neoplastic disease as well.    Large right pleural effusion with adjacent compressive atelectasis of the   right lung.    Few sclerotic foci of the bones are indeterminate. For reference, 7 mm   left iliac wing sclerotic lesion, image 237 series 2. Correlation with   bone scan is recommended.    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    --- End of Report ---            VEDA SANTILLAN M.D., ATTENDING RADIOLOGIST  This document has been electronically signed. May  5 2023  4:35PM    < end of copied text >

## 2023-05-13 NOTE — PROGRESS NOTE ADULT - PROBLEM SELECTOR PROBLEM 7
(Idiopathic) normal pressure hydrocephalus
Paroxysmal atrial fibrillation
(Idiopathic) normal pressure hydrocephalus
Paroxysmal atrial fibrillation
Paroxysmal atrial fibrillation
(Idiopathic) normal pressure hydrocephalus
Paroxysmal atrial fibrillation
Paroxysmal atrial fibrillation
(Idiopathic) normal pressure hydrocephalus
Paroxysmal atrial fibrillation
(Idiopathic) normal pressure hydrocephalus
Paroxysmal atrial fibrillation

## 2023-05-13 NOTE — PROGRESS NOTE ADULT - PROBLEM SELECTOR PLAN 5
Continue home regimen Finasteride
Continue with home regimen statin
Continue home regimen Finasteride
Continue home regimen Finasteride
Patient is bedbound. Total care. High risk for skin failure    -Supportive care  -Frequent positioning.
Continue home regimen Finasteride
Continue with home regimen statin
Continue home regimen Finasteride
Patient is bedbound. Total care. High risk for skin failure    -Supportive care  -Frequent positioning
c/w finasteride (home meds)
Pt is bedbound. Total care. High risk for skin failure  Supportive care  Frequent positioning    Comfort only.
Continue home regimen Finasteride
Continue with home regimen statin
Continue home regimen Finasteride
Continue with home regimen statin
Continue with home regimen statin
Continue home regimen Finasteride
c/w finasteride (home meds)
Continue home regimen Finasteride
Continue home regimen Finasteride
Continue with home regimen statin
Continue with home regimen statin

## 2023-05-13 NOTE — PROGRESS NOTE ADULT - PROBLEM SELECTOR PROBLEM 6
Paroxysmal atrial fibrillation
Paroxysmal atrial fibrillation
BPH (benign prostatic hyperplasia)
Paroxysmal atrial fibrillation
Paroxysmal atrial fibrillation
Palliative care encounter
Palliative care encounter
BPH (benign prostatic hyperplasia)
Palliative care encounter
Paroxysmal atrial fibrillation
BPH (benign prostatic hyperplasia)
Paroxysmal atrial fibrillation
BPH (benign prostatic hyperplasia)
Paroxysmal atrial fibrillation
BPH (benign prostatic hyperplasia)

## 2023-05-13 NOTE — PROGRESS NOTE ADULT - PROBLEM SELECTOR PROBLEM 2
Pleural effusion, right
Hospice care patient
Hospice care patient
Pleural effusion, right
Hospice care patient
Hospice care patient
Pleural effusion, right
Cancer related pain
Hospice care patient
Hospice care patient
Cancer related pain
Cancer related pain
Hospice care patient
Pleural effusion, right
Hospice care patient
Pleural effusion, right

## 2023-05-13 NOTE — PROGRESS NOTE ADULT - PROBLEM SELECTOR PLAN 7
S/p  shunt   Pt is bedbound  No acute deficits
Continue home regimen Eliquis, and KRISTIN
S/p  shunt   Pt is bedbound  No acute deficits
pt with gait instability, urinary incontinence, dementia  s/p  shunt   c/w management as above
Continue home regimen Eliquis, and KRISTIN
S/p  shunt   Pt is bedbound  No acute deficits
S/p  shunt   Pt is bedbound  No acute deficits
Continue home regimen Eliquis, and KRISTIN
S/p  shunt   Pt is bedbound  No acute deficits
Continue home regimen Eliquis, and KRISTIN
Continue home regimen Eliquis, and KRISTIN
S/p  shunt   Pt is bedbound  No acute deficits
Continue home regimen Eliquis, and KRISTIN
Continue home regimen Eliquis, and KRISTIN
S/p  shunt   Pt is bedbound  No acute deficits
pt with gait instability, urinary incontinence, dementia  s/p  shunt   c/w management as above

## 2023-05-13 NOTE — PROGRESS NOTE ADULT - PROBLEM SELECTOR PLAN 1
CXR noted above  SpO2 94% on 2L  Relaxed WOB  Continue supportive care
CXR noted above  SpO2 95% on 2L  Relaxed WOB  Continue supportive care
CXR shows a new right pleural effusion with no evidence of pneumonia or CHF.  -Pt. with shallow breathing pattern  -O2 sat 90% on RA  -Started O2 N/C 2L/min  -Supportive care
Chest CT reveals Extensive metastatic neoplasm of the lungs and liver in this patient with   history of colon cancer
Chest CT reveals Extensive metastatic neoplasm of the lungs and liver in this patient with   history of colon cancer
CXR noted above  SpO2 95% on 2L  Relaxed WOB  Continue supportive care
CXR noted above  SpO2 98% on 2L  Relaxed WOB  Continue supportive care
CXR noted above  SpO2 95% on 2L  Relaxed WOB  Continue supportive care
Chest CT reveals Extensive metastatic neoplasm of the lungs and liver in this patient with   history of colon cancer
Extensive metastatic neoplasm of the lungs and liver, lymph nodes, and abdomen. Associated with peritoneal/omental carcinomatosis and moderate ascites.     > Cancer lab markers (5/3/2023): elevated, as follows:   Carcinoembryonic antigen= 45.2; Cancer antigen 125= 124; Cancer antigen, GI ca 19-22    EC[facility]  SNF, [facility] with hospice care services    -Supportive Care.
CXR noted above  SpO2 94% on 2L  Relaxed WOB  Continue supportive care
CXR noted above  SpO2 95% on 2L  Relaxed WOB  Continue supportive care
CXR noted above  SpO2 95% on 2L  Relaxed WOB  Continue supportive care
CXR noted above  SpO2 98% on 2L  Relaxed WOB  Continue supportive care
Extensive metastatic neoplasm of the lungs and liver, lymph nodes, and abdomen. Associated with peritoneal/omental carcinomatosis and moderate ascites. Patient is at high risk for mortality, morbidity, and infections.    [facility]  SNF, [facility] with hospice care services    -Supportive Care.
Chest CT reveals Extensive metastatic neoplasm of the lungs and liver in this patient with   history of colon cancer  -Awaiting [facility] with hospice placement
Extensive metastatic neoplasm of the lungs and liver, lymph nodes, and abdomen. Associated with peritoneal/omental carcinomatosis and moderate ascites.    [facility]  SNF with hospice care services    Supportive Care
CXR shows a new right pleural effusion with no evidence of pneumonia or CHF.  -Pt. with shallow breathing pattern  -O2 sat 90% on RA  -Started O2 N/C 2L/min  -Supportive care
CXR noted above  SpO2 95% on 2L  Relaxed WOB  Continue supportive care
Chest CT reveals Extensive metastatic neoplasm of the lungs and liver in this patient with   history of colon cancer
Chest CT reveals Extensive metastatic neoplasm of the lungs and liver in this patient with   history of colon cancer  -Awaiting [facility] with hospice placement
CXR noted above  SpO2 95% on 2L  Relaxed WOB  Continue supportive care
Chest CT reveals Extensive metastatic neoplasm of the lungs and liver in this patient with   history of colon cancer
CXR noted above  SpO2 95% on 2L  Relaxed WOB  Continue supportive care

## 2023-05-13 NOTE — PROGRESS NOTE ADULT - PROBLEM SELECTOR PLAN 4
Continue home regimen Amlodipine, Enalapril and BB
Continue with home regimen statin
Continue home regimen Amlodipine, Enalapril and BB
Continue with home regimen statin
Continue with home regimen statin
Continue home regimen Amlodipine, Enalapril and BB
Continue with home regimen statin
Continue with home regimen statin
Clinical evidence indicates that the patient has Severe protein calorie malnutrition/ 3rd degree. Albumin 1.9.  Moderate oral intake prior to admission.     In context of  Chronic Illness (>1 month)    Energy/Food intake <50% of estimated energy requirement >5 days  Weight loss: Moderate - severe   Body Fat loss: Severe  Cachexia, temporal wasting,  muscle atrophy  Muscle mass loss: Severe    Fluid Accumulation: Severe pleural effusions   Strength: weakened severe bedbound    Recommend:   Regular food, as tolerated  Aspiration precautions, careful hand-feeding, teaching to caregivers  Nutritional supplements as tolerated, nutrition consult    No feeding tube
Clinical evidence indicates that the patient has Severe protein calorie malnutrition/ 3rd degree. Albumin= 1.9 (5/1/2023). Moderate oral intake prior to admission.    In context of  Chronic Illness (>1 month)    Energy/Food intake <50% of estimated energy requirement >5 days  Weight loss: Moderate - severe   Body Fat loss: Moderate muscle atrophy  Muscle mass loss: Severe    Fluid Accumulation: Severe pleural effusions   Strength: weakened severe bedbound    Recommendations:   Regular food, as tolerated  Aspiration precautions, careful hand-feeding, teaching to caregivers  Elevate the head of the bed 45 degrees of higher during feeding  Nutritional supplements as tolerated, nutrition consult
Continue home regimen Amlodipine, Enalapril and BB
Continue with home regimen statin
Continue with home regimen statin
Continue home regimen Amlodipine, Enalapril and BB
Continue with home regimen statin
Continue with home regimen statin
c/w statin (home med)
Continue with home regimen statin
Clinical evidence indicates that the patient has Severe protein calorie malnutrition/ 3rd degree. Albumin 1.9.  Moderate  po intake prior to admission.     In context of  Chronic Illness (>1 month)    Energy/Food intake <50% of estimated energy requirement >5 days  Weight loss: Moderate - severe   Body Fat loss: Severe   Cachexia, temporal wasting,  muscle atrophy  Muscle mass loss: Severe    Fluid Accumulation: Severe pleural effusions   Strength: weakened severe bedbound    Recommend:   pleasure feeds as tolerated - aspiration precautions, careful hand-feeding, teaching to caregivers  nutritional supplements as tolerated, nutrition consult    No feeding tube.
Continue with home regimen statin
c/w statin (home med)
Continue home regimen Amlodipine, Enalapril and BB
Continue with home regimen statin
Continue home regimen Amlodipine, Enalapril and BB

## 2023-05-13 NOTE — PROGRESS NOTE ADULT - PROBLEM SELECTOR PROBLEM 1
Metastatic neoplastic disease
Pleural effusion, right
Carcinoma of colon metastatic to multiple sites
Pleural effusion, right
Metastatic neoplastic disease
Carcinoma of colon metastatic to multiple sites
Carcinoma of colon metastatic to multiple sites
Pleural effusion, right
Metastatic neoplastic disease
Pleural effusion, right

## 2023-05-13 NOTE — PROGRESS NOTE ADULT - PROBLEM SELECTOR PLAN 8
S/p  shunt   Pt is bedbound  No acute deficits
c/w sertraline (home med)
Continue home regimen sertraline
S/p  shunt   Pt is bedbound  No acute deficits
S/p  shunt   Pt is bedbound  No acute deficits
Continue home regimen sertraline
c/w sertraline (home med)
Continue home regimen sertraline
S/p  shunt   Pt is bedbound  No acute deficits
Continue home regimen sertraline
S/p  shunt   Pt is bedbound  No acute deficits

## 2023-05-13 NOTE — PROGRESS NOTE ADULT - PROBLEM SELECTOR PROBLEM 4
Severe protein-calorie malnutrition
HLD (hyperlipidemia)
Severe protein-calorie malnutrition
HTN (hypertension)
HLD (hyperlipidemia)
HLD (hyperlipidemia)
HTN (hypertension)
Severe protein-calorie malnutrition
HLD (hyperlipidemia)
HTN (hypertension)
HLD (hyperlipidemia)
HTN (hypertension)
HLD (hyperlipidemia)
HLD (hyperlipidemia)
HTN (hypertension)

## 2023-05-13 NOTE — PROGRESS NOTE ADULT - PROBLEM SELECTOR PLAN 10
Pt medically stable for discharge  SW following for placement
DVT PPX: Eliquis
Pt medically stable for discharge  SW following for placement
Pt medically stable for discharge  SW following for placement
DVT PPX: Eliquis
Pt medically stable for discharge  SW following for placement
DVT PPX: Eliquis
Pt medically stable for discharge  SW following for placement
DVT PPX: Eliquis
Pt medically stable for discharge  SW following for placement
DVT PPX: Eliquis
Pt medically stable for discharge  SW following for placement
DVT PPX: Eliquis
Pt medically stable for discharge  SW following for placement
DVT PPX: Eliquis

## 2023-05-13 NOTE — PROGRESS NOTE ADULT - PROBLEM SELECTOR PROBLEM 10
Discharge planning issues
Prophylactic measure
Discharge planning issues
Prophylactic measure
Discharge planning issues
Prophylactic measure
Prophylactic measure
Discharge planning issues
Prophylactic measure
Discharge planning issues
Discharge planning issues
Prophylactic measure
Discharge planning issues
Prophylactic measure

## 2023-05-13 NOTE — PROGRESS NOTE ADULT - PROBLEM SELECTOR PROBLEM 9
Depression
Prophylactic measure
Prophylactic measure
Depression
Depression
Prophylactic measure
Depression
Prophylactic measure
Prophylactic measure
Depression
Prophylactic measure
Depression
Depression
Prophylactic measure

## 2023-05-13 NOTE — PROGRESS NOTE ADULT - PROBLEM SELECTOR PROBLEM 3
HTN (hypertension)
HTN (hypertension)
Hospice care patient
HTN (hypertension)
Advanced dementia
HTN (hypertension)
Hospice care patient
Hospice care patient
Advanced dementia
Hospice care patient
HTN (hypertension)
Hospice care patient
HTN (hypertension)
HTN (hypertension)
Advanced dementia
HTN (hypertension)
HTN (hypertension)
Hospice care patient
HTN (hypertension)
Hospice care patient
HTN (hypertension)

## 2023-05-13 NOTE — PROGRESS NOTE ADULT - PROBLEM SELECTOR PLAN 11
-Awaiting [facility] with hospice
Pt medically stable for discharge  SW following for placement
-Awaiting [facility] with hospice
-Awaiting [facility] with hospice
-Awaiting [facility] with hospice
-Awaiting [facility] with hospice
Pt medically stable for discharge  SW following for placement

## 2023-05-13 NOTE — PROGRESS NOTE ADULT - ASSESSMENT
77M from home hospice, w/ PMHx HTN, HLD, BPH, PAF (on eliquis), colon cancer w/ mets (s/p chemo and surgery), and NPH (w/  shunt), dementia presents with intractable pain and increased needs at home. Admitted to medicine for pain control hospice disposition.   Pt. in mild resp distress likely due to right pleural effusion noted on CXR, placed on O2 N/C 2L.  CT Chest 5/5 shows Extensive metastatic neoplasm of the lungs and liver in this patient with history of colon cancer.  Palliative following, will f/u reccs.  Awaiting discharge to SNF with hospice.
77M from home hospice, w/ PMHx HTN, HLD, BPH, PAF (on eliquis), colon cancer w/ mets (s/p chemo and surgery), and NPH (w/  shunt), dementia presents with intractable pain and increased needs at home. Admitted to medicine for pain control hospice disposition.   Pt. in mild resp distress likely due to right pleural effusion noted on CXR, placed on O2 N/C 2L.  Palliative following, will f/u reccs.  Awaiting discharge to SNF with hospice.
77M from home hospice, w/ PMHx HTN, HLD, BPH, PAF (on eliquis), colon cancer w/ mets (s/p chemo and surgery), and NPH (w/  shunt), dementia presents with intractable pain and increased needs at home. Admitted to medicine for pain control hospice disposition.   Pt. in mild resp distress likely due to right pleural effusion noted on CXR, placed on O2 N/C 2L.  CT Chest 5/5 shows Extensive metastatic neoplasm of the lungs and liver in this patient with history of colon cancer.  Palliative following, will f/u reccs.  Awaiting discharge to SNF with hospice.
77M from home hospice, w/ PMHx HTN, HLD, BPH, PAF (on eliquis), colon cancer w/ mets (s/p chemo and surgery), and NPH (w/  shunt), dementia presents with intractable pain and increased needs at home. Admitted to medicine for pain control hospice disposition.   Pt. in mild resp distress likely due to right pleural effusion noted on CXR, placed on O2 N/C 2L.  CT Chest 5/5 shows Extensive metastatic neoplasm of the lungs and liver in this patient with history of colon cancer.  Palliative following, will f/u reccs.  Awaiting discharge to SNF with hospice.
77M from home hospice, w/ PMHx HTN, HLD, BPH, PAF (on eliquis), colon cancer w/ mets (s/p chemo and surgery), and NPH (w/  shunt), dementia presents with intractable pain and increased needs at home. Admitted to medicine for pain control hospice disposition.   Pt. in mild resp distress likely due to right pleural effusion noted on CXR, placed on O2 N/C 2L.  Palliative following, will f/u reccs.
77M from home hospice, w/ PMHx HTN, HLD, BPH, PAF (on eliquis), colon cancer w/ mets (s/p chemo and surgery), and NPH (w/  shunt), dementia presents with intractable pain and increased needs at home. Admitted to medicine for pain control hospice disposition.   Pt. in mild resp distress likely due to right pleural effusion noted on CXR, placed on O2 N/C 2L.  Palliative following, will f/u reccs.
77M from home hospice, w/ PMHx HTN, HLD, BPH, PAF (on eliquis), colon cancer w/ mets (s/p chemo and surgery), and NPH (w/  shunt), dementia presents with intractable pain and increased needs at home. Admitted to medicine for pain control hospice disposition.   Pt. in mild resp distress likely due to right pleural effusion noted on CXR, placed on O2 N/C 2L.  Palliative following, will f/u reccs.  Awaiting discharge to SNF with hospice.
77M from home hospice, w/ PMHx HTN, HLD, BPH, PAF (on eliquis), colon cancer w/ mets (s/p chemo and surgery), and NPH (w/  shunt), dementia presents with intractable pain and increased needs at home. Admitted to medicine for pain control hospice disposition.   Pt. in mild resp distress likely due to right pleural effusion noted on CXR, placed on O2 N/C 2L.  Palliative following, will f/u reccs.
77M from home hospice, w/ PMHx HTN, HLD, BPH, PAF (on eliquis), colon cancer w/ mets (s/p chemo and surgery), and NPH (w/  shunt), dementia presents with intractable pain and increased needs at home. Admitted to medicine for pain control hospice disposition.   Pt. in mild resp distress likely due to right pleural effusion noted on CXR, placed on O2 N/C 2L.  Palliative following, will f/u reccs.
77 year old male, from home hospice, with PMHx HTN, HLD, BPH, PAF (on eliquis), colon cancer w/ mets (s/p chemo and surgery), and NPH (w/  shunt), dementia. CC: presented with left sided body pain, generalized weakness, and unable to ambulate for 3 days. Pt AAOx1, collateral obtained from daughter and HCP Irina at bedside. Per daughter, pt was enrolled in [facility] hospice in October 2022 due to decline in mental and functional status after surgery for colon cancer, when decision was made to focus on noninvasive intervention. Patient has had increased pain at home, increased urinary and bowel incontinence and has not been able to ambulate due to pain. CT Chest w/ IV Cont (05.05.23) showed extensive carcinoma of colon metastatic to multiple sites. See full report above. Patient needs ongoing symptoms' management and is hospice eligible.
77M from home hospice, w/ PMHx HTN, HLD, BPH, PAF (on eliquis), colon cancer w/ mets (s/p chemo and surgery), and NPH (w/  shunt), dementia presents with intractable pain and increased needs at home. Admitted to medicine for pain control hospice disposition.   Pt. in mild resp distress likely due to right pleural effusion noted on CXR, placed on O2 N/C 2L.  CT Chest 5/5 shows Extensive metastatic neoplasm of the lungs and liver in this patient with history of colon cancer.  Palliative following, will f/u reccs.  Awaiting discharge to SNF with hospice.
77M from home hospice, w/ PMHx HTN, HLD, BPH, PAF (on eliquis), colon cancer w/ mets (s/p chemo and surgery), and NPH (w/  shunt), dementia presents with intractable pain and increased needs at home. Admitted to medicine for pain control hospice disposition.   Pt. in mild resp distress likely due to right pleural effusion noted on CXR, placed on O2 N/C 2L.  Palliative following, will f/u reccs.
77M from home hospice, w/ PMHx HTN, HLD, BPH, PAF (on eliquis), colon cancer w/ mets (s/p chemo and surgery), and NPH (w/  shunt), dementia presents with intractable pain and increased needs at home. Admitted to medicine for pain control hospice disposition.   Pt. in mild resp distress likely due to right pleural effusion noted on CXR, placed on O2 N/C 2L.  CT Chest 5/5 shows Extensive metastatic neoplasm of the lungs and liver in this patient with history of colon cancer.  Palliative following, will f/u reccs.  Awaiting discharge to SNF with hospice.
77M from home hospice, w/ PMHx HTN, HLD, BPH, PAF (on eliquis), colon cancer w/ mets (s/p chemo and surgery), and NPH (w/  shunt), dementia presents with intractable pain and increased needs at home. Admitted to medicine for pain control hospice disposition.   Pt. in mild resp distress likely due to right pleural effusion noted on CXR, placed on O2 N/C 2L.  Palliative following, will f/u reccs.
77M from home hospice, w/ PMHx HTN, HLD, BPH, PAF (on eliquis), colon cancer w/ mets (s/p chemo and surgery), and NPH (w/  shunt), dementia presents with intractable pain and increased needs at home. Admitted to medicine for pain control hospice disposition.   Pt. in mild resp distress likely due to right pleural effusion noted on CXR, placed on O2 N/C 2L.  CT Chest 5/5 shows Extensive metastatic neoplasm of the lungs and liver in this patient with history of colon cancer.  Palliative following, will f/u reccs.  Awaiting discharge to SNF with hospice.
77M from home hospice, w/ PMHx HTN, HLD, BPH, PAF (on eliquis), colon cancer w/ mets (s/p chemo and surgery), and NPH (w/  shunt), dementia presents with intractable pain and increased needs at home. Admitted to medicine for pain control hospice disposition.   Pt. in mild resp distress likely due to right pleural effusion noted on CXR, placed on O2 N/C 2L.  Palliative following, will f/u reccs.
77M from home hospice, w/ PMHx HTN, HLD, BPH, PAF (on eliquis), colon cancer w/ mets (s/p chemo and surgery), and NPH (w/  shunt), dementia presents with intractable pain and increased needs at home. Admitted to medicine for pain control hospice disposition.   Pt. in mild resp distress likely due to right pleural effusion noted on CXR, placed on O2 N/C 2L.  Palliative following, will f/u reccs.
77 year old male, from home hospice, with PMHx HTN, HLD, BPH, PAF (on eliquis), colon cancer w/ mets (s/p chemo and surgery), and NPH (w/  shunt), dementia. CC: presented with left sided body pain, generalized weakness, and unable to ambulate for 3 days. Pt AAOx1, collateral obtained from daughter and HCP Irina at bedside. Per daughter, pt was enrolled in [facility] hospice in October 2022 due to decline in mental and functional status after surgery for colon cancer, when decision was made to focus on noninvasive intervention. Patient has had increased pain at home, increased urinary and bowel incontinence and has not been able to ambulate due to pain. CT Chest w/ IV Cont (05.05.23) showed extensive carcinoma of colon metastatic to multiple sites. See partial report above. Patient needs ongoing symptoms' management and is hospice eligible. Family accepted [facility]. Pending documentation.
77M from home hospice, w/ PMHx HTN, HLD, BPH, PAF (on eliquis), colon cancer w/ mets (s/p chemo and surgery), and NPH (w/  shunt), dementia presents with intractable pain and increased needs at home. Admitted to medicine for pain control hospice disposition.   Pt. in mild resp distress likely due to right pleural effusion noted on CXR, placed on O2 N/C 2L.  CT Chest 5/5 shows Extensive metastatic neoplasm of the lungs and liver in this patient with history of colon cancer.  Palliative following, will f/u reccs.  Awaiting discharge to SNF with hospice.
77M from home hospice, w/ PMHx HTN, HLD, BPH, PAF (on eliquis), colon cancer w/ mets (s/p chemo and surgery), and NPH (w/  shunt), dementia presents with intractable pain and increased needs at home. Admitted to medicine for pain control hospice disposition.   Pt. in mild resp distress likely due to right pleural effusion noted on CXR, placed on O2 N/C 2L.  CT Chest 5/5 shows Extensive metastatic neoplasm of the lungs and liver in this patient with history of colon cancer.  Palliative following, will f/u reccs.  Awaiting discharge to SNF with hospice.
77M from home hospice, w/ PMHx HTN, HLD, BPH, PAF (on eliquis), colon cancer w/ mets (s/p chemo and surgery), and NPH (w/  shunt), dementia presents with intractable pain and increased needs at home. Admitted to medicine for pain control hospice disposition.   Pt. in mild resp distress likely due to right pleural effusion noted on CXR, placed on O2 N/C 2L.  Palliative following, will f/u reccs.
77M from home hospice, w/ PMHx HTN, HLD, BPH, PAF (on eliquis), colon cancer w/ mets (s/p chemo and surgery), and NPH (w/  shunt), dementia presents with intractable pain and increased needs at home. Admitted to medicine for pain control hospice disposition.   Pt. in mild resp distress likely due to right pleural effusion noted on CXR, placed on O2 N/C 2L.  Palliative following, will f/u reccs.
77M from home hospice, w/ PMHx HTN, HLD, BPH, PAF (on eliquis), colon cancer w/ mets (s/p chemo and surgery), and NPH (w/  shunt), dementia presents with intractable pain and increased needs at home. Admitted to medicine for pain control hospice disposition.   Pt. in mild resp distress likely due to right pleural effusion noted on CXR, placed on O2 N/C 2L.  CT Chest 5/5 shows Extensive metastatic neoplasm of the lungs and liver in this patient with history of colon cancer.  Palliative following, will f/u reccs.  Awaiting discharge to SNF with hospice.

## 2023-05-13 NOTE — CHART NOTE - NSCHARTNOTEFT_GEN_A_CORE
EVENT: Called by RN that pt is diaphoretic, and tachycardia, Pt prognosis is very poor due to metastatic disease. spoke with daughter Javier. Discussed in detail regarding pt;s prognosis, advised that Pt may die overnight or maybe tomorrow. Daughter and Son understands and appreciates nicolette from NP.     OBJECTIVE:  Vital Signs Last 24 Hrs  T(C): 36.9 (13 May 2023 04:50), Max: 37.6 (12 May 2023 20:54)  T(F): 98.4 (13 May 2023 04:50), Max: 99.6 (12 May 2023 20:54)  HR: 101 (13 May 2023 04:50) (101 - 111)  BP: 143/90 (13 May 2023 04:50) (143/90 - 160/94)  BP(mean): --  RR: 18 (13 May 2023 04:50) (18 - 18)  SpO2: 93% (13 May 2023 04:50) (93% - 94%)    Parameters below as of 13 May 2023 04:50  Patient On (Oxygen Delivery Method): nasal cannula  O2 Flow (L/min): 2    A/p  77M from home hospice, w/ PMHx HTN, HLD, BPH, PAF (on eliquis), colon cancer w/ mets (s/p chemo and surgery), and NPH (w/  shunt), dementia presents with intractable pain and increased needs at home. Admitted to medicine for pain control hospice disposition.   Pt. in mild resp distress likely due to right pleural effusion noted on CXR, placed on O2 N/C 2L.  CT Chest 5/5 shows Extensive metastatic neoplasm of the lungs and liver in this patient with history of colon cancer.    # Metastatic neoplastic disease.   Chest CT reveals Extensive metastatic neoplasm of the lungs and liver in this patient with   history of colon cancer.  - Pt prognosis very poor- family informed  - mews suspended  - comfort care  - palliative following

## 2023-05-13 NOTE — PROGRESS NOTE ADULT - PROBLEM SELECTOR PROBLEM 5
BPH (benign prostatic hyperplasia)
Debility
HLD (hyperlipidemia)
BPH (benign prostatic hyperplasia)
Debility
Debility
HLD (hyperlipidemia)
HLD (hyperlipidemia)
BPH (benign prostatic hyperplasia)
HLD (hyperlipidemia)
BPH (benign prostatic hyperplasia)
BPH (benign prostatic hyperplasia)
HLD (hyperlipidemia)

## 2023-05-13 NOTE — PROGRESS NOTE ADULT - PROBLEM SELECTOR PROBLEM 8
Depression
(Idiopathic) normal pressure hydrocephalus
(Idiopathic) normal pressure hydrocephalus
Depression
(Idiopathic) normal pressure hydrocephalus
Depression
(Idiopathic) normal pressure hydrocephalus
Depression
(Idiopathic) normal pressure hydrocephalus
Depression
Depression
(Idiopathic) normal pressure hydrocephalus
(Idiopathic) normal pressure hydrocephalus

## 2023-05-14 NOTE — DISCHARGE NOTE FOR THE EXPIRED PATIENT - HOSPITAL COURSE
77M from home hospice, w/ PMHx HTN, HLD, BPH, PAF (on eliquis), colon cancer w/ mets (s/p chemo and surgery), and NPH (w/  shunt), dementia presents with intractable pain and increased needs at home. Admitted to medicine for pain control hospice disposition.   Pt. in mild resp distress likely due to right pleural effusion noted on CXR, placed on O2 N/C 2L.  CT Chest 5/5 shows Extensive metastatic neoplasm of the lungs and liver in this patient with history of colon cancer.  Palliative following, pt DNR/DNI with comfort measures only   Noted at bedside pt not breathing and unresponsive on exam pt with no response to verbal or tactile stimuli, no spontaneous breathing, pupils fixed and dilated, absent heart or breath sounds, asystole on rhythm strip.pt pronounced at 0914. Daughter Irina at bedside   Dr. Newman made aware

## 2023-05-14 NOTE — CHART NOTE - NSCHARTNOTEFT_GEN_A_CORE
EVENT:   5/14/23, 5:30am, called by RN re: patient had fever, T- 102.4F, HR - 134, BP - 105/89, RR- 18, o2sat - 90% on 4L N/C. Pt.'s not able tolerate po meds.      77 year old male, from home hospice, with PMH HTN, HLD, BPH, PAF (on Eliquis), colon cancer w/ mets (s/p chemo and surgery), and NPH (w/  shunt), dementia presented with left sided body pain, generalized weakness, and unable to ambulate for 3 days. Pt AAOx1, collateral obtained from daughter and HCP Irina at bedside. Per daughter, pt was enrolled in [facility] in October 2022 due to decline in mental and functional status after surgery for colon cancer, when decision was made to focus on noninvasive intervention. Patient has had increased pain at home, increased urinary and bowel incontinence and has not been able to ambulate due to pain. Patient reports headache, dizziness, chest pain, and right lower extremity pain. Patient denies all other complaints including fever/chills, cough, n/v/d/c, dysuria or leg swelling. NKDA   OBJECTIVE:  Vital Signs Last 24 Hrs  T(C): 39.1 (14 May 2023 05:13), Max: 39.1 (14 May 2023 05:13)  T(F): 102.4 (14 May 2023 05:13), Max: 102.4 (14 May 2023 05:13)  HR: 134 (14 May 2023 05:13) (111 - 134)  BP: 105/89 (14 May 2023 05:13) (93/66 - 107/66)  BP(mean): --  RR: 18 (14 May 2023 05:13) (18 - 18)  SpO2: 90% (14 May 2023 05:13) (90% - 92%)    Parameters below as of 14 May 2023 05:13  Patient On (Oxygen Delivery Method): nasal cannula  O2 Flow (L/min):       PLAN:   - Tylenol suppository 650 mg [place] Q6hrs PRN for T> 100.4F,   - Ice pack to b/l upper arm.     FOLLOW UP / RESULT:  - Reassess patient VS,   - Maintain patient safety and comfort level.

## 2023-06-12 NOTE — PATIENT PROFILE ADULT - JOB HELP
Called to f/u, spoke to stefanie who stated she also contact PCP and they advised to contact cardiology regarding cough  Pt cough is non productive  Also advised to make an OV with PCP but will forward info to cardiology for advise  no

## 2023-09-26 NOTE — ED ADULT NURSE NOTE - HAVE YOU RECEIVED AT LEAST TWO PFIZER AND/OR MODERNA VACCINATIONS (IN ANY COMBINATION) AND/OR ONE JOHNSON & JOHNSON VACCINATION?
[facility]      [facility]    Patient Name: Mercedes Kidd  :  1945      Chief Complaint:  Chief Complaint   Patient presents with    Blood Pressure Check         HPI  Patient presents today for follow-up on hypertension. Reports compliance with olmesartan 40 mg daily. He has been monitoring BP at home and brought a log to today's visit. Readings have been in the range of 115-165/69-91, with about half of the readings before greater than 025 systolic. He denies any CP, dizziness, headache, SOB or edema. He also has concerns about a health screening exam he had done at his [email or web address]. He brings results today from a carotid artery screening revealing mild diease on the right side and moderate disease on the left side.        Past Medical History:   Diagnosis Date    BPH with elevated PSA 2013    Cancer ([address])     skin cancer -face; prostate cancer    CKD (chronic kidney disease) 2013    Cyst of kidney, acquired     bilateral    Cystitis cystica     GERD (gastroesophageal reflux disease)     controlled with medication    Granuloma annulare 2013    Gross hematuria     Hiatal hernia     Hypercholesteremia     Hyperlipidemia 2013    Hypertension     controlled with medication    IFG (impaired fasting glucose) 2013    Impotence of organic origin     Malignant neoplasm of trigone of urinary bladder ([address])     Melanoma ([address])     left shoulder    Osteoarthritis of knee     Osteoarthritis of left knee 2013    Osteoarthritis of right knee 2013    Positive PPD 2013    Prostate cancer ([address])     Rhinitis 2013    Skin cancer     Spondylosis, cervical 2013    Stress incontinence, male     Unspecified disorder of bladder     Unspecified disorder of kidney and ureter        Past Surgical History:   Procedure Laterality Date    COLONOSCOPY  2016    due for repeat in 5 years    FRACTURE SURGERY      [facility]    JOINT REPLACEMENT  2012    Bilateral knee replacement Yes

## 2023-11-30 NOTE — H&P PST ADULT - NS ABD PE RECTAL EXAM
Additional Notes: Several lesions on the scalp and face were treated with LN2 as a courtesy today
Detail Level: Simple
Render Risk Assessment In Note?: no
not examined

## 2023-12-01 NOTE — PROCEDURE NOTE - ESTIMATED BLOOD LOSS
Subjective   History of Present Illness  Patient is a pleasant 27-year-old female with no premedical history who presents the emergency room with complaints of chills, diaphoresis, diarrhea, headache and cough that started yesterday.  She had her daughter here earlier this week and was diagnosed with bronchitis but has had similar symptoms.  Patient has had no fever, chest pain, dysuria.  Patient does have a history of suicidal ideation and self-harm about 6 years ago when she cut herself but she states that she has not been having any issues of that sort since having her daughter at about that time.  She does not take any medication on daily basis.  She denies use of drugs clinical tobacco.      Review of Systems   Constitutional:  Negative for appetite change and fever.   HENT:  Negative for congestion and rhinorrhea.    Respiratory:  Positive for cough. Negative for shortness of breath.    Cardiovascular:  Negative for chest pain.   Gastrointestinal:  Positive for diarrhea. Negative for abdominal pain and nausea.   Genitourinary:  Negative for dysuria.   Musculoskeletal:  Positive for myalgias.   Neurological:  Positive for headaches. Negative for dizziness.   Psychiatric/Behavioral:  The patient is not nervous/anxious.    All other systems reviewed and are negative.      History reviewed. No pertinent past medical history.    No Known Allergies    History reviewed. No pertinent surgical history.    History reviewed. No pertinent family history.    Social History     Socioeconomic History    Marital status: Single           Objective   Physical Exam  Vitals and nursing note reviewed.   Constitutional:       General: She is not in acute distress.     Appearance: Normal appearance. She is obese. She is not ill-appearing.   HENT:      Head: Normocephalic and atraumatic.      Mouth/Throat:      Pharynx: Posterior oropharyngeal erythema present. No oropharyngeal exudate.   Cardiovascular:      Rate and Rhythm: Normal  "rate and regular rhythm.      Heart sounds: Normal heart sounds. No murmur heard.  Pulmonary:      Effort: Pulmonary effort is normal. No respiratory distress.      Breath sounds: Normal breath sounds.   Abdominal:      General: Bowel sounds are normal.      Tenderness: There is no abdominal tenderness.   Neurological:      Mental Status: She is alert and oriented to person, place, and time.         Procedures           ED Course      /63 (BP Location: Right arm)   Pulse 75   Temp 98.9 °F (37.2 °C) (Oral)   Resp 16   Ht 162.6 cm (64\")   Wt 107 kg (235 lb)   LMP 11/27/2023   SpO2 100%   BMI 40.34 kg/m²   Labs Reviewed   BASIC METABOLIC PANEL - Abnormal; Notable for the following components:       Result Value    Creatinine 0.55 (*)     All other components within normal limits    Narrative:     GFR Normal >60  Chronic Kidney Disease <60  Kidney Failure <15     CBC WITH AUTO DIFFERENTIAL - Abnormal; Notable for the following components:    RDW 17.5 (*)     All other components within normal limits   RSV PCR - Normal   COVID-19 AND FLU A/B, NP SWAB IN TRANSPORT MEDIA 1 HR TAT - Normal    Narrative:     Fact sheet for providers: https://www.fda.gov/media/826968/download    Fact sheet for patients: https://www.fda.gov/media/457460/download    Test performed by PCR.   CBC AND DIFFERENTIAL    Narrative:     The following orders were created for panel order CBC & Differential.  Procedure                               Abnormality         Status                     ---------                               -----------         ------                     CBC Auto Differential[[id number]]        Abnormal            Final result                 Please view results for these tests on the individual orders.     Medications   sodium chloride 0.9 % flush 10 mL (has no administration in time range)   sodium chloride 0.9 % bolus 1,000 mL (1,000 mL Intravenous New Bag 12/1/23 1207)   ketorolac (TORADOL) injection 15 mg (15 mg " Intravenous Given 12/1/23 1207)   ondansetron ODT (ZOFRAN-ODT) disintegrating tablet 4 mg (4 mg Oral Given 12/1/23 1253)     XR Chest 1 View    Result Date: 12/1/2023  Impression: No evidence of pneumonia Electronically Signed: Edwin Ren  12/1/2023 12:17 PM EST  Workstation ID: [id number]                                          Medical Decision Making  Problems Addressed:  Acute cough: complicated acute illness or injury  Bronchitis: complicated acute illness or injury  Diarrhea, unspecified type: complicated acute illness or injury    Amount and/or Complexity of Data Reviewed  Labs: ordered.  Radiology: ordered.    Risk  Prescription drug management.    Patient is a 27-year-old obese female with no prior medical history who presents the emergency room with complaints of generalized body aches, diarrhea, headache and chills that started yesterday after her daughter was diagnosed with bronchitis earlier this week with similar symptoms.  On exam, patient is alert and answers questions appropriately.  Normal S1 to chest without clicks murmurs.  No JVD.  Lungs clear to auscultation in all fields.  Abdomen found to be soft and nontender with normal bowel sounds throughout.  Initial differentials include bronchitis, COVID-19, pneumonia, viral illness.  This is not a complete list.    Due to overwhelming hospital census and boarding inpatients in the emergency room, patient received above examination in hallway bed.  Examination was made to the best of provider's ability with respect to patient privacy and confidentiality.  IV was established and labs were obtained.  Patient received Toradol and a fluid bolus.  Respiratory panel was considered but would not change the course of care and RSV/COVID swabs were ordered instead.  Patient's work-up has been unremarkable.  No COVID, RSV or influenza detected.  Her CBC and BMP are essentially normal.  My interpretation of chest x-ray reveals no infiltrates, nodules or  pneumothorax.  This did concur with radiologist.  Upon reassessment, patient has had a bout of nausea but is otherwise feeling okay.  Results were discussed with the patient and she was advised to follow-up to primary care provider for further evaluation as needed.  Patient will be discharged with Bromfed for her cough and congestion.  She was also requesting resources for therapy sessions and was given information on [facility].  At this time, patient is stable for discharge.  She has remained hemodynamically stable and is in no acute distress.    I discussed the findings with patient who voices understanding of discharge instructions, signs and symptoms requiring return to the ED; discharged improved and stable condition with follow-up for reevaluation.    Patient is aware that discharge does not mean that nothing is wrong but it indicates no emergency is present and they must continue care with follow-up as given below or physician of their choice.    This document is intended for medical expert use only.  Reading of this document by patients and/or patient's family without participating medical staff guidance may result in misinterpretation and unintended morbidity.  Any interpretation of such data is the responsibility of the patient and/or family member responsible for the patient in concert with their primary or specialist providers, not to be left for sources of online search as such as [employer], [employer] or similar queries.  Relying on these approaches to knowledge may result in misinterpretation, misguided goals of care and even death should patient or family members try recommendations outside of the realm of professional medical care in a supervised inpatient environment.    This medical document was created using Dragon dictation system. Some errors in speech recognition may occur.    Final diagnoses:   Diarrhea, unspecified type   Acute cough   Bronchitis       ED Disposition  ED  Disposition       ED Disposition   Discharge    Condition   Stable    Comment   --               [facility]  [address]  [facility] [address] [phone]        [facility]  [address] [place] [address] [phone]        PATIENT CONNECTION - [facility] [address] [phone]             Medication List        New Prescriptions      brompheniramine-pseudoephedrine-DM 30-2-10 MG/5ML syrup  Take 5 mL by mouth 4 (Four) Times a Day As Needed for Congestion or Cough for up to 5 days.               Where to Get Your Medications        These medications were sent to [id number] - [place], [place] - [address] - [phone]  - [phone] FX  [address] [facility] [place] [address]      Phone: [phone]   brompheniramine-pseudoephedrine-DM 30-2-10 MG/5ML syrup            Kait Allen, APRN  12/01/23 1440     none

## 2024-01-09 NOTE — PATIENT PROFILE ADULT. - NS PRO PT RIGHT SUPPORT PERSON
[facility]: National Cardiology Data Registry (NCDR) Medication Review    Jade Clement is s/p PCI with drug-eluting stent placement for NSTEMI . Pharmacy to review discharge medications to make sure appropriate medications have been prescribed.    Patient has been discharged on the following:  Aspirin: 81 mg once a day  High Intensity Statin: statin intolerances noted, has been on pravastatin 20 mg once a day will continue  Beta-blocker: stable tachycardia currently- has been unable to tolerate beta blockade  P2Y12 Inhibitor: clopidogrel 75 mg once a day  LVEF <40: too hypotensive to initiate ace/arb or arni    These medications meet the requirements for NCDR discharge medication for chest pain and MI.    Kole Talbert [facility]  Clinical Pharmacist     Declines

## 2024-01-18 NOTE — ASU PREOP CHECKLIST - HAIR REMOVAL
Subjective:  Fela Martinez is a 73 year old female who presents for a 6 month follow-up check.      HPI:  Historian: Self.     Type 2 diabetes mellitus with microalbuminuria, without long-term current use of insulin (CMD)/Type 2 diabetes mellitus with diabetic polyneuropathy, without long-term current use of insulin (CMD): Is on medication.    Hyperlipidemia associated with type 2 diabetes mellitus (CMD)/Class 3 severe obesity due to excess calories with serious comorbidity and body mass index (BMI) of 40.0 to 44.9 in adult (CMD): Lost 12 lb.    Hypertension complicating diabetes (CMD): Is on medication.    Postablative hypothyroidism: Is on medication.    Gastroesophageal reflux disease, unspecified whether esophagitis present: Is on medication.    Overactive bladder: Stopped Oxybutynin and using Myrbetriq 25 mg daily. Mentions Myrbetriq is costly and requests for cost-effective alternative medication.      Major depressive disorder in full remission, unspecified whether recurrent (CMD): Is on medication.    ALLERGIES:   Allergen Reactions    Atorvastatin Other (See Comments)     Myalgia    Simvastatin Other (See Comments)     Myalgia       Current Outpatient Medications   Medication Sig Dispense Refill    sertraline (ZOLOFT) 25 MG tablet Take 1 tablet by mouth daily. 90 tablet 3    metoPROLOL succinate (TOPROL-XL) 50 MG 24 hr tablet Take 1 tablet by mouth daily. 90 tablet 3    exenatide ER (Bydureon BCise) 2 MG/0.85ML auto injector Inject 0.85 mLs into the skin every 7 days. INJECT 2 MG (THE CONTENTS OF ONE PEN OR ONE VIAL) IMMEDIATELY UNDER THE SKIN INTO THE THIGH, ABDOMEN, OR UPPER ARM EVERY 7 DAYS 5.1 mL 3    Vibegron 75 MG Tab Take 75 mg by mouth daily. 30 tablet 11    levothyroxine 100 MCG tablet Take 1 tablet by mouth daily. With 112 mcg to make 212 mcg daily. 90 tablet 3    gabapentin (NEURONTIN) 300 MG capsule Take 1 capsule by mouth in the morning and 1 capsule in the evening. 180 capsule 3     levothyroxine 112 MCG tablet Take 1 tablet by mouth daily. With 100 mcg to make 212 mcg daily. 90 tablet 3    fenofibrate 160 MG tablet Take 1 tablet by mouth daily. 90 tablet 3    amLODIPine (NORVASC) 5 MG tablet Take 1 tablet by mouth at bedtime. 90 tablet 3    metFORMIN (GLUCOPHAGE-XR) 500 MG 24 hr tablet Take 2 tablets by mouth in the morning and 2 tablets in the evening. Take with meals. 360 tablet 3    omeprazole (PriLOSEC) 40 MG capsule Take 1 capsule by mouth daily. 30 capsule 11    mirabegron ER (MYRBETRIQ) 25 MG 24 hour tablet Take 1 tablet by mouth daily. 90 tablet 3    losartan (COZAAR) 100 MG tablet Take 1 tablet by mouth daily. 90 tablet 3    blood glucose test strip Test blood sugar 1-2 times daily as directed. Diagnosis: E11.9. Meter: Contour Next 100 each 3    furosemide (LASIX) 40 MG tablet TAKE ONE TABLET BY MOUTH DAILY 90 tablet 1    potassium CHLORIDE (KLOR-CON M) 20 MEQ sarthak ER tablet TAKE ONE TABLET BY MOUTH DAILY 90 tablet 1    aspirin 81 MG EC tablet Take 81 mg by mouth daily.       vitamin B-12 (CYANOCOBALAMIN) 50 MCG tablet Take by mouth daily.       Ferrous Sulfate (IRON) 325 (65 Fe) MG Tab Take 1 tablet by mouth 2 times daily.        No current facility-administered medications for this visit.     PMH:   Diabetes II  Hypertension  Hyperlipidemia   Postablative hypothyroidism   Morbid obesity   Asthma   Gastritis/gastric ulcer - EGD 2010     PSH:   Abdominal excision of a tumor?   Hysterectomy/no BSO   Herniorrhaphy   Benign breast biopsy   Left total hip arthroplasty - Dr. Toth  Right knee arthroplasty - complicated by right foot drop, wearing AFO  Right total hip arthroplasty - complicated by post op anemia, transfused 2 units PRBCs      SH: . She quit smoking in about . She rarely drinks alcohol. No drug use.      FH: Mother and brother have diabetes.  Her son  from a stroke at 42.  Has a family history of breast cancer.        ROS:  HEENT: Her hearing aids are  working well.    Respiratory:  No dyspnea.   Cardiovascular:  No chest pain.   Psychiatric:  No sleep problems.  No anxiety.  No depression.      Objective:  Blood pressure 124/66, pulse 76, temperature 98.3 °F (36.8 °C), temperature source Temporal, height 5' 4\", weight 109.8 kg (242 lb), SpO2 94 %.  --General appearance - Alert & orientated, pleasant, well groomed, casually dressed woman, with no distress. Mood is euthymic.  --Eyes - PERRLA.  EOMs intact.   --Ears - External ears normal.  Canals clear.  TMs normal.  --Nose/Sinuses - Nares normal.  Mucosa normal.    --Oropharynx - MMM.  Oropharynx pink.  --Neck - Neck supple.  No adenopathy.  Thyroid normal size without goiter or nodules.    --Heart - RRR with normal S1S2.  No murmurs.    --Lungs - Lungs are clear. CTA throughout.  No rales.  No rhonchi.  No wheezing.  Normal air movement.  Normal effort.    --Musculoskeletal - Ambulating with a cane.   --Diabetic foot exam - The monofilament sensation is intact to the toes, no diabetic sores. The pedal pulses, dorsalis pedis and posterior tibial pulses are intact. Have some numbness to the toes.    A/P:  Fela Martinez is a 73 year old female with  1. Type 2 diabetes mellitus with microalbuminuria, without long-term current use of insulin (CMD)/Type 2 diabetes mellitus with diabetic polyneuropathy, without long-term current use of insulin (CMD): The last A1C was controlled at 5.3 %, the CMP showed fasting blood sugar was 90 mg/dL, creatinine of 0.92 mg/dL, GFR of 66 ml/min, decreased kidney function, normal electrolyte levels. Ordered HbA1C, CBC with Automated Differential, Comprehensive Metabolic Panel, Microalbumin Urine random. Complete fasting labs one week prior to the next visit. Continue metformin 1000 mg twice daily.   2. Hyperlipidemia associated with type 2 diabetes mellitus (CMD)/Class 3 severe obesity due to excess calories with serious comorbidity and body mass index (BMI) of 40.0 to 44.9 in adult  (CMD): The cholesterol level is near optimal. Ordered lipid panel with reflex. Continue Bydureon and aspirin 81 mg. Refills provided. Continue working on weight reduction to reduce 10 lb, exercise regularly, following dietary modifications.  3. Hypertension complicating diabetes (CMD): Continue furosemide, amlodipine, losartan, and metoprolol as refilled.  4. Postablative hypothyroidism: No active concerns today. Last TSH was normal at 0.781 mcUnits/mL. Ordered Thyroid Stimulating Hormone Reflex. Continue levothyroxine 112 mcg.  5. Gastroesophageal reflux disease, unspecified whether esophagitis present: Continue omeprazole daily.   6. Overactive bladder: Prescribed Vibegron 75 mg daily. Stop Myrbetriq.  7. Major depressive disorder in full remission, unspecified whether recurrent (CMD): Continue Zoloft 25 mg.  Refills provided.     Additional plan details:   Immunizations: Recommended taking RSV vaccination at the local pharmacy with Medicare Part D. Advised to check with BCBS since she did not have Medicare Part D.    Follow up with Dr. Casper in 6 months. Call with any questions or concerns. All questions were answered.  AVS provided.     Refer to orders.  Medical compliance with plan discussed and risks of non-compliance reviewed.  Patient education completed on disease process, etiology & prognosis.  Proper usage and side effects of medications reviewed & discussed.  Return to clinic as clinically indicated as discussed with patient who verbalized understanding of the plan and is in agreement with the plan.    I, Lindsay Myers, have created a visit summary document based on the audio recording between Dr. Francisco Casper MD and this patient for the physician to review, edit as needed, and authenticate.  Creation Date: 1/17/2024    I have reviewed and edited the progress note and agree with what has been scribed.    Electronically Signed by Francisco Casper M.D. 1/17/2024      hair removal not indicated

## 2024-03-08 NOTE — PATIENT PROFILE ADULT. - REASON FOR ADMISSION
Detail Level: Detailed Lumbar Laminectomy Mother  Still living? Yes, Estimated age: 51-60  Family history of breast cancer, Age at diagnosis: Age Unknown

## 2024-03-21 NOTE — PATIENT PROFILE ADULT - NSPROMEDSDISPOSITION_GEN_A_NUR
"Subjective   Reason for Visit: Deidre Santos is an 72 y.o. female here for a Medicare Wellness visit.          Reviewed all medications by prescribing practitioner or clinical pharmacist (such as prescriptions, OTCs, herbal therapies and supplements) and documented in the medical record.    HPI  She came for wellness visit  Has been doing well  Colonoscopy she declined  Mammogram done in 2021 wanted to have it done this year and order was requested and is being done  She is overweight her BMI is elevated diet and exercise was discussed  She has not been taking levothyroxine because levothyroxine did not do well for her but repeat lab has already been ordered and to be done and she knows that    Patient Care Team:  Ruddy Hackett MD as PCP - General (Internal Medicine)  Ruddy Hackett MD as PCP - [employer] PCP     Review of Systems  All systemic inquiry is negative  Except she mentioned that a family member has been diagnosed to have Parkinson's disease and she is concerned her gait has been somewhat shuffling sometimes  Past medical history reviewed  Social and family history reviewed  Allergies and medications reviewed  Recent labs reviewed  Vital signs reviewed    Objective   Vitals:  /84 (BP Location: Right arm, Patient Position: Sitting)   Pulse 62   Temp 36.1 °C (97 °F)   Ht 1.676 m (5' 6\")   Wt 131 kg (288 lb 9.6 oz)   BMI 46.58 kg/m²       Physical Exam  Cardiovascular chest abdominal examination normal  Neurologically awake alert orientated  No tremor at rest  Mild cogwheel rigidity more so prominent on the left upper extremity than the right  Some gait abnormality    Assessment/Plan   Problem List Items Addressed This Visit       Anxiety    Benign essential hypertension    GERD (gastroesophageal reflux disease)    Hyperlipemia    Relevant Orders    Lipid Panel    Hypothyroidism    Relevant Orders    TSH with reflex to Free T4 if abnormal    Allergic rhinitis, unspecified    " Relevant Medications    fluticasone (Flonase) 50 mcg/actuation nasal spray    Gait abnormality    Relevant Orders    CBC and Auto Differential    Referral to Neurology    Cogwheel rigidity    Relevant Orders    CBC and Auto Differential    Referral to Neurology     Other Visit Diagnoses       Routine general medical examination at health care facility    -  Primary    Well adult health check        Relevant Orders    Comprehensive Metabolic Panel    Encounter for screening mammogram for malignant neoplasm of breast        Relevant Orders    BI mammo bilateral screening tomosynthesis          We discussed the living will she has not done yet she has papers and yet to do  Labs as ordered to be done  Mammogram is ordered to be done  Colonoscopy declined  In view of the presence of cogwheel rigidity and abnormality of the gait and neurology consult with Dr. Ortega is being advised  Follow-up advised after the blood work        sent to pharmacy

## 2025-02-26 NOTE — ED ADULT TRIAGE NOTE - WEIGHT IN KG
Quality 130: Documentation Of Current Medications In The Medical Record: Current Medications Documented
Quality 431: Preventive Care And Screening: Unhealthy Alcohol Use - Screening: Patient not identified as an unhealthy alcohol user when screened for unhealthy alcohol use using a systematic screening method
Detail Level: Detailed
Quality 226: Preventive Care And Screening: Tobacco Use: Screening And Cessation Intervention: Patient screened for tobacco use and is an ex/non-smoker
86.5
